# Patient Record
Sex: MALE | Race: ASIAN | NOT HISPANIC OR LATINO | URBAN - METROPOLITAN AREA
[De-identification: names, ages, dates, MRNs, and addresses within clinical notes are randomized per-mention and may not be internally consistent; named-entity substitution may affect disease eponyms.]

---

## 2017-01-13 ENCOUNTER — INPATIENT (INPATIENT)
Facility: HOSPITAL | Age: 52
LOS: 0 days | Discharge: HOME | End: 2017-01-14
Attending: INTERNAL MEDICINE | Admitting: INTERNAL MEDICINE
Payer: COMMERCIAL

## 2017-01-13 VITALS
SYSTOLIC BLOOD PRESSURE: 150 MMHG | HEART RATE: 86 BPM | RESPIRATION RATE: 18 BRPM | DIASTOLIC BLOOD PRESSURE: 100 MMHG | OXYGEN SATURATION: 1 % | TEMPERATURE: 98 F

## 2017-01-13 DIAGNOSIS — K60.3 ANAL FISTULA: Chronic | ICD-10-CM

## 2017-01-13 DIAGNOSIS — R07.9 CHEST PAIN, UNSPECIFIED: ICD-10-CM

## 2017-01-13 DIAGNOSIS — E11.9 TYPE 2 DIABETES MELLITUS WITHOUT COMPLICATIONS: ICD-10-CM

## 2017-01-13 DIAGNOSIS — I24.9 ACUTE ISCHEMIC HEART DISEASE, UNSPECIFIED: ICD-10-CM

## 2017-01-13 DIAGNOSIS — S96.912S STRAIN OF UNSPECIFIED MUSCLE AND TENDON AT ANKLE AND FOOT LEVEL, LEFT FOOT, SEQUELA: Chronic | ICD-10-CM

## 2017-01-13 DIAGNOSIS — E78.5 HYPERLIPIDEMIA, UNSPECIFIED: ICD-10-CM

## 2017-01-13 DIAGNOSIS — Z29.9 ENCOUNTER FOR PROPHYLACTIC MEASURES, UNSPECIFIED: ICD-10-CM

## 2017-01-13 DIAGNOSIS — R10.13 EPIGASTRIC PAIN: ICD-10-CM

## 2017-01-13 DIAGNOSIS — E79.0 HYPERURICEMIA WITHOUT SIGNS OF INFLAMMATORY ARTHRITIS AND TOPHACEOUS DISEASE: ICD-10-CM

## 2017-01-13 LAB
ALBUMIN SERPL ELPH-MCNC: 4.4 G/DL — SIGNIFICANT CHANGE UP (ref 3.3–5)
ALP SERPL-CCNC: 54 U/L — SIGNIFICANT CHANGE UP (ref 40–120)
ALT FLD-CCNC: 29 U/L — SIGNIFICANT CHANGE UP (ref 4–41)
AST SERPL-CCNC: 24 U/L — SIGNIFICANT CHANGE UP (ref 4–40)
BILIRUB SERPL-MCNC: 0.6 MG/DL — SIGNIFICANT CHANGE UP (ref 0.2–1.2)
BUN SERPL-MCNC: 16 MG/DL — SIGNIFICANT CHANGE UP (ref 7–23)
CALCIUM SERPL-MCNC: 9.7 MG/DL — SIGNIFICANT CHANGE UP (ref 8.4–10.5)
CHLORIDE SERPL-SCNC: 96 MMOL/L — LOW (ref 98–107)
CK MB BLD-MCNC: 7.18 NG/ML — HIGH (ref 1–6.6)
CK SERPL-CCNC: 161 U/L — SIGNIFICANT CHANGE UP (ref 30–200)
CK SERPL-CCNC: 226 U/L — HIGH (ref 30–200)
CO2 SERPL-SCNC: 21 MMOL/L — LOW (ref 22–31)
CREAT SERPL-MCNC: 0.97 MG/DL — SIGNIFICANT CHANGE UP (ref 0.5–1.3)
GLUCOSE SERPL-MCNC: 196 MG/DL — HIGH (ref 70–99)
HCT VFR BLD CALC: 44.9 % — SIGNIFICANT CHANGE UP (ref 39–50)
HGB BLD-MCNC: 15 G/DL — SIGNIFICANT CHANGE UP (ref 13–17)
HIV1 AG SER QL: SIGNIFICANT CHANGE UP
HIV1+2 AB SPEC QL: SIGNIFICANT CHANGE UP
LIDOCAIN IGE QN: 45.6 U/L — SIGNIFICANT CHANGE UP (ref 7–60)
MCHC RBC-ENTMCNC: 27.1 PG — SIGNIFICANT CHANGE UP (ref 27–34)
MCHC RBC-ENTMCNC: 33.4 % — SIGNIFICANT CHANGE UP (ref 32–36)
MCV RBC AUTO: 81 FL — SIGNIFICANT CHANGE UP (ref 80–100)
PLATELET # BLD AUTO: 273 K/UL — SIGNIFICANT CHANGE UP (ref 150–400)
PMV BLD: 9.6 FL — SIGNIFICANT CHANGE UP (ref 7–13)
POTASSIUM SERPL-MCNC: 4.8 MMOL/L — SIGNIFICANT CHANGE UP (ref 3.5–5.3)
POTASSIUM SERPL-SCNC: 4.8 MMOL/L — SIGNIFICANT CHANGE UP (ref 3.5–5.3)
PROT SERPL-MCNC: 7.6 G/DL — SIGNIFICANT CHANGE UP (ref 6–8.3)
RBC # BLD: 5.54 M/UL — SIGNIFICANT CHANGE UP (ref 4.2–5.8)
RBC # FLD: 13.3 % — SIGNIFICANT CHANGE UP (ref 10.3–14.5)
SODIUM SERPL-SCNC: 137 MMOL/L — SIGNIFICANT CHANGE UP (ref 135–145)
TROPONIN T SERPL-MCNC: < 0.06 NG/ML — SIGNIFICANT CHANGE UP (ref 0–0.06)
TROPONIN T SERPL-MCNC: < 0.06 NG/ML — SIGNIFICANT CHANGE UP (ref 0–0.06)
WBC # BLD: 10.94 K/UL — HIGH (ref 3.8–10.5)
WBC # FLD AUTO: 10.94 K/UL — HIGH (ref 3.8–10.5)

## 2017-01-13 PROCEDURE — 71020: CPT | Mod: 26

## 2017-01-13 PROCEDURE — 76705 ECHO EXAM OF ABDOMEN: CPT | Mod: 26

## 2017-01-13 PROCEDURE — 99223 1ST HOSP IP/OBS HIGH 75: CPT

## 2017-01-13 RX ORDER — FAMOTIDINE 10 MG/ML
20 INJECTION INTRAVENOUS ONCE
Qty: 0 | Refills: 0 | Status: COMPLETED | OUTPATIENT
Start: 2017-01-13 | End: 2017-01-13

## 2017-01-13 RX ORDER — INSULIN LISPRO 100/ML
VIAL (ML) SUBCUTANEOUS AT BEDTIME
Qty: 0 | Refills: 0 | Status: DISCONTINUED | OUTPATIENT
Start: 2017-01-13 | End: 2017-01-14

## 2017-01-13 RX ORDER — SODIUM CHLORIDE 9 MG/ML
1000 INJECTION, SOLUTION INTRAVENOUS
Qty: 0 | Refills: 0 | Status: DISCONTINUED | OUTPATIENT
Start: 2017-01-13 | End: 2017-01-14

## 2017-01-13 RX ORDER — ACETAMINOPHEN 500 MG
650 TABLET ORAL EVERY 6 HOURS
Qty: 0 | Refills: 0 | Status: DISCONTINUED | OUTPATIENT
Start: 2017-01-13 | End: 2017-01-14

## 2017-01-13 RX ORDER — DEXTROSE 50 % IN WATER 50 %
1 SYRINGE (ML) INTRAVENOUS ONCE
Qty: 0 | Refills: 0 | Status: DISCONTINUED | OUTPATIENT
Start: 2017-01-13 | End: 2017-01-14

## 2017-01-13 RX ORDER — GLUCAGON INJECTION, SOLUTION 0.5 MG/.1ML
1 INJECTION, SOLUTION SUBCUTANEOUS ONCE
Qty: 0 | Refills: 0 | Status: DISCONTINUED | OUTPATIENT
Start: 2017-01-13 | End: 2017-01-14

## 2017-01-13 RX ORDER — SODIUM CHLORIDE 9 MG/ML
3 INJECTION INTRAMUSCULAR; INTRAVENOUS; SUBCUTANEOUS EVERY 8 HOURS
Qty: 0 | Refills: 0 | Status: DISCONTINUED | OUTPATIENT
Start: 2017-01-13 | End: 2017-01-14

## 2017-01-13 RX ORDER — DEXTROSE 50 % IN WATER 50 %
25 SYRINGE (ML) INTRAVENOUS ONCE
Qty: 0 | Refills: 0 | Status: DISCONTINUED | OUTPATIENT
Start: 2017-01-13 | End: 2017-01-14

## 2017-01-13 RX ORDER — PANTOPRAZOLE SODIUM 20 MG/1
40 TABLET, DELAYED RELEASE ORAL
Qty: 0 | Refills: 0 | Status: DISCONTINUED | OUTPATIENT
Start: 2017-01-13 | End: 2017-01-14

## 2017-01-13 RX ORDER — INSULIN LISPRO 100/ML
VIAL (ML) SUBCUTANEOUS
Qty: 0 | Refills: 0 | Status: DISCONTINUED | OUTPATIENT
Start: 2017-01-13 | End: 2017-01-14

## 2017-01-13 RX ORDER — DEXTROSE 50 % IN WATER 50 %
12.5 SYRINGE (ML) INTRAVENOUS ONCE
Qty: 0 | Refills: 0 | Status: DISCONTINUED | OUTPATIENT
Start: 2017-01-13 | End: 2017-01-14

## 2017-01-13 RX ORDER — ASPIRIN/CALCIUM CARB/MAGNESIUM 324 MG
81 TABLET ORAL DAILY
Qty: 0 | Refills: 0 | Status: DISCONTINUED | OUTPATIENT
Start: 2017-01-13 | End: 2017-01-14

## 2017-01-13 RX ORDER — ENOXAPARIN SODIUM 100 MG/ML
40 INJECTION SUBCUTANEOUS EVERY 24 HOURS
Qty: 0 | Refills: 0 | Status: DISCONTINUED | OUTPATIENT
Start: 2017-01-13 | End: 2017-01-14

## 2017-01-13 RX ADMIN — SODIUM CHLORIDE 3 MILLILITER(S): 9 INJECTION INTRAMUSCULAR; INTRAVENOUS; SUBCUTANEOUS at 11:08

## 2017-01-13 RX ADMIN — Medication 30 MILLILITER(S): at 13:38

## 2017-01-13 RX ADMIN — FAMOTIDINE 20 MILLIGRAM(S): 10 INJECTION INTRAVENOUS at 11:08

## 2017-01-13 RX ADMIN — SODIUM CHLORIDE 3 MILLILITER(S): 9 INJECTION INTRAMUSCULAR; INTRAVENOUS; SUBCUTANEOUS at 21:45

## 2017-01-13 RX ADMIN — Medication 30 MILLILITER(S): at 21:46

## 2017-01-13 NOTE — H&P ADULT. - LYMPHATIC
posterior cervical L/posterior cervical R/supraclavicular R/supraclavicular L/anterior cervical L/anterior cervical R

## 2017-01-13 NOTE — H&P ADULT. - FAMILY HISTORY
Mother  Still living? Unknown  Family history of hypertension, Age at diagnosis: Age Unknown  Family history of diabetic complications, Age at diagnosis: Age Unknown

## 2017-01-13 NOTE — ED PROVIDER NOTE - MEDICAL DECISION MAKING DETAILS
epigastric pain that radiates to the chest, jaw and left arm, hx of DM, EKG no acute ischemia, will get serial ekgs and CE, check labs and reassess

## 2017-01-13 NOTE — H&P ADULT. - PROBLEM SELECTOR PLAN 1
CM  CE negative x 2  Cardio consult in the chart   Consider nuclear stress test   Give o2, ASA CE negative x 2, check third set with AM labs  Cardio consult in the chart   Nuclear stress test per cardiology recommendations  will start on ASA for now given DM2 and chest pain

## 2017-01-13 NOTE — H&P ADULT. - NEGATIVE NEUROLOGICAL SYMPTOMS
no tremors/no weakness/no transient paralysis/no generalized seizures/no focal seizures/no syncope/no paresthesias

## 2017-01-13 NOTE — H&P ADULT. - NEGATIVE GASTROINTESTINAL SYMPTOMS
no vomiting/no diarrhea/no constipation no vomiting/no diarrhea/no steatorrhea/no hematochezia/no melena/no constipation

## 2017-01-13 NOTE — H&P ADULT. - PROBLEM SELECTOR PLAN 2
Consider Gi consult   F/U Hepatic profile   Continue Malox PRN Given diarrhea in ED, ddx more likely viral gastroenteritis   If persistent abd pain, consider GI consult in AM  Patient had extensive previous w/u for RUQ pain that was also negative, recommended to be on PPI that patient reports he does not regularly take  Continue Maalox PRN, restart PPI

## 2017-01-13 NOTE — ED ADULT NURSE NOTE - CHIEF COMPLAINT
The patient is a 51y Male complaining of epigastric pain traveling upwards and nausea. Denies vomiting/sob.

## 2017-01-13 NOTE — H&P ADULT. - EKG AND INTERPRETATION
NSR @ 90b/ min, LAE, IRBBB EKG reviewed NSR 90b/min, isolated TWI III, QTc 440ms, auto read reporting IRBBB

## 2017-01-13 NOTE — ED PROVIDER NOTE - OBJECTIVE STATEMENT
52 yo male with a history of DM, HLD diet control, presents to the ED with epigastric pain that began yesterday while at work and has progressed, feels like pressure, at times radiates to neck and left arm, at times feels SOB and nausea, and some sweatiness, no vomiting, no palpitations, no known aggravating or alleviating factors, no leg swelling, no pleuritic component. Called his PMD Dr Whitten but he was not in the office so came to the ED.   Meds Metformin and glyburide

## 2017-01-13 NOTE — H&P ADULT. - PSH
Anal fistula  Ananl fistula repair 2013  Hx of appendectomy  1987  Tendon tear, ankle, left, sequela  2016

## 2017-01-13 NOTE — ED PROVIDER NOTE - PROGRESS NOTE DETAILS
PATSY Laura MD: D/w Dr Diaz, pt to be admitted for work up and stress test PATSY Herrera MD: D/w Dr Diaz, pt to be admitted for work up and stress test.  Dr Griffiths contacted for consult on Dr Diaz's request

## 2017-01-13 NOTE — H&P ADULT. - ASSESSMENT
51M admitted for ACS 51-year-old Omani male with NIDDM2 presented with 1 day of sharp/twisting/cramping, 10/10, constant, abdominal pain that radiated to his L side chest , L arm and back.

## 2017-01-13 NOTE — H&P ADULT. - RS GEN PE MLT RESP DETAILS PC
breath sounds equal/good air movement/respirations non-labored/airway patent good air movement/no wheezes/no rhonchi/no intercostal retractions/respirations non-labored/breath sounds equal/normal/airway patent/clear to auscultation bilaterally/no rales

## 2017-01-13 NOTE — H&P ADULT. - NEGATIVE ENMT SYMPTOMS
no abnormal taste sensation/no nose bleeds/no gum bleeding no gum bleeding/no dysphagia/no abnormal taste sensation/no sinus symptoms/no hearing difficulty/no nose bleeds

## 2017-01-13 NOTE — H&P ADULT. - HISTORY OF PRESENT ILLNESS
51M with a past medical history of DM2, experienced exertional, sharp, 10 /10, constant, epigastric pain that radiated to his L side chest , L arm and back. Positive SOB, nausea, chills. No cough, vomit, diaphoresis. The sharp, 10/10 pain, last until he drove himself to the Ed on 12/13.16.     In the Ed, he had CE negative x 2.  Hepatic US that showed Hepatic stenosis. He was seen by Dr Griffiths for cardio. His consult and recommendations are in the chart. The pt was given Maalox and Pepcid IV and he experience relief of his sharp 10/10 pain. 51M with a past medical history of DM2, experienced exertional, sharp, 10/10, constant, epigastric pain that radiated to his L side chest , L arm and back. Positive SOB, nausea, chills. No cough, vomit, diaphoresis. The sharp, 10/10 pain, last until he drove himself to the Ed on 12/13.16. He has never experienced pain like this in the past. In 2013 He had a stress test by Dr Mccoy and says it was normal.      In the Ed, he had CE negative x 2.  Hepatic US that showed Hepatic stenosis. He was seen by Dr Griffiths for cardio. His consult and recommendations are in the chart. The pt was given Maalox and Pepcid IV and he experience relief of his sharp 10/10 pain. 51-year-old Qatari male with NIDDM2 presented with 1 day of sharp/twisting/cramping, 10/10, constant, abdominal pain that radiated to his L side chest, L arm and back. Patient reports pain is predominantly in epigastric region however travels to [place] and [place]. He reports relief of abdominal pain with laying down and flexing his knees, pain worse with food.  He has had nausea and chills, denies vomiting.  Had 3 episodes of non-bloody diarrhea since presenting to the emergency department. States has never had pain like this before.  He denies fevers, shortness of breath, cough, palpitations, dysuria, diaphoresis. He reports pain improved in the ED s/p Maalox. In 2013 he had a stress test by Dr. Mccoy and reports it was normal.  Patient reports having a prior EGD that was nml.  He reports he occasionally uses omeprazole 20mg however has not recently been taking this.  He denies any recent travel or sick contacts.  Last abx use was augmentin, 5 day course approx 1 mo. ago.    In the Ed, he had CE negative x 2.  Hepatic US that showed Hepatic stenosis. He was seen by Dr Griffiths from cardiology. His consult and recommendations are in the chart. The pt was given Maalox and Pepcid IV and he experience relief of his sharp 10/10 pain.    Of note, on review of historical [employer] notes patient had an EGD 12/09/2010 for abd pain and found to have mild gastritis/esophagitis (negative for H. pylori), had a colonoscopy 08/07/2012 that was negative except for internal hemorrhoids and had a CT abd 8/03/10 without IV con that showed no obstructive uropathy.    He also had multiple prior stress tests, the last myocardial perfusion SPECT was in 07/16/2012 performed by Dr. Mccoy that was normal. 51-year-old Salvadorean male with NIDDM2 presented with 1 day of sharp/twisting/cramping, 10/10, constant, abdominal pain that radiated to his L side chest, L arm and back. Pain started at work yesterday when he was sitting at the computer and has persisted since that time. Patient reports pain is predominantly in epigastric region however travels to [facility] and [place]. He reports relief of abdominal pain with laying down and flexing his knees, pain worse with food.  He has had nausea and chills, denies vomiting.  Had 3 episodes of non-bloody diarrhea since presenting to the emergency department. States has never had pain like this before.  He denies fevers, shortness of breath, cough, palpitations, dysuria, diaphoresis. He reports pain improved in the ED s/p Maalox. In 2013 he had a stress test by Dr. Mccoy and reports it was normal.  Patient reports having a prior EGD that was nml.  He reports he occasionally uses omeprazole 20mg however has not recently been taking this.  He denies any recent travel or sick contacts.  Last abx use was augmentin, 5 day course approx 1 mo. ago.    In the Ed, he had CE negative x 2.  Hepatic US that showed Hepatic stenosis. He was seen by Dr Griffiths from cardiology. His consult and recommendations are in the chart. The pt was given Maalox and Pepcid IV and he experience relief of his sharp 10/10 pain.    Of note, on review of historical [employer] notes patient had an EGD 12/09/2010 for abd pain and found to have mild gastritis/esophagitis (negative for H. pylori), had a colonoscopy 08/07/2012 that was negative except for internal hemorrhoids and had a CT abd 8/03/10 without IV con that showed no obstructive uropathy.    He also had multiple prior stress tests, the last myocardial perfusion SPECT was in 07/16/2012 performed by Dr. Mccoy that was normal.

## 2017-01-13 NOTE — H&P ADULT. - NEGATIVE CARDIOVASCULAR SYMPTOMS
no palpitations/no orthopnea/no dyspnea on exertion no dyspnea on exertion/no peripheral edema/no palpitations/no orthopnea

## 2017-01-13 NOTE — H&P ADULT. - PROBLEM SELECTOR PLAN 3
F/U A1C  FS QID  Humalog SS F/U A1C  FS QID  Humalog SS  hold home oral hypoglycemics (pt reports didn't take his meds yesterday)

## 2017-01-13 NOTE — H&P ADULT. - LAB RESULTS AND INTERPRETATION
Labs personally reviewed  CE (-)x2  lipase wnl  hyperglycemia  normal renal function   LFTs wnl  CBC unremarkable

## 2017-01-14 ENCOUNTER — TRANSCRIPTION ENCOUNTER (OUTPATIENT)
Age: 52
End: 2017-01-14

## 2017-01-14 VITALS
TEMPERATURE: 98 F | HEART RATE: 70 BPM | SYSTOLIC BLOOD PRESSURE: 150 MMHG | OXYGEN SATURATION: 97 % | DIASTOLIC BLOOD PRESSURE: 94 MMHG | RESPIRATION RATE: 18 BRPM

## 2017-01-14 LAB
BUN SERPL-MCNC: 15 MG/DL — SIGNIFICANT CHANGE UP (ref 7–23)
CALCIUM SERPL-MCNC: 9.1 MG/DL — SIGNIFICANT CHANGE UP (ref 8.4–10.5)
CHLORIDE SERPL-SCNC: 102 MMOL/L — SIGNIFICANT CHANGE UP (ref 98–107)
CHOLEST SERPL-MCNC: 192 MG/DL — SIGNIFICANT CHANGE UP (ref 120–199)
CK SERPL-CCNC: 108 U/L — SIGNIFICANT CHANGE UP (ref 30–200)
CO2 SERPL-SCNC: 22 MMOL/L — SIGNIFICANT CHANGE UP (ref 22–31)
CREAT SERPL-MCNC: 0.88 MG/DL — SIGNIFICANT CHANGE UP (ref 0.5–1.3)
GLUCOSE SERPL-MCNC: 176 MG/DL — HIGH (ref 70–99)
HBA1C BLD-MCNC: 7.9 % — HIGH (ref 4–5.6)
HCT VFR BLD CALC: 42.9 % — SIGNIFICANT CHANGE UP (ref 39–50)
HDLC SERPL-MCNC: 41 MG/DL — SIGNIFICANT CHANGE UP (ref 35–55)
HGB BLD-MCNC: 14.3 G/DL — SIGNIFICANT CHANGE UP (ref 13–17)
LIPID PNL WITH DIRECT LDL SERPL: 129 MG/DL — SIGNIFICANT CHANGE UP
MCHC RBC-ENTMCNC: 26.9 PG — LOW (ref 27–34)
MCHC RBC-ENTMCNC: 33.3 % — SIGNIFICANT CHANGE UP (ref 32–36)
MCV RBC AUTO: 80.8 FL — SIGNIFICANT CHANGE UP (ref 80–100)
PLATELET # BLD AUTO: 267 K/UL — SIGNIFICANT CHANGE UP (ref 150–400)
PMV BLD: 9.1 FL — SIGNIFICANT CHANGE UP (ref 7–13)
POTASSIUM SERPL-MCNC: 4 MMOL/L — SIGNIFICANT CHANGE UP (ref 3.5–5.3)
POTASSIUM SERPL-SCNC: 4 MMOL/L — SIGNIFICANT CHANGE UP (ref 3.5–5.3)
RBC # BLD: 5.31 M/UL — SIGNIFICANT CHANGE UP (ref 4.2–5.8)
RBC # FLD: 13.5 % — SIGNIFICANT CHANGE UP (ref 10.3–14.5)
SODIUM SERPL-SCNC: 140 MMOL/L — SIGNIFICANT CHANGE UP (ref 135–145)
TRIGL SERPL-MCNC: 239 MG/DL — HIGH (ref 10–149)
TROPONIN T SERPL-MCNC: < 0.06 NG/ML — SIGNIFICANT CHANGE UP (ref 0–0.06)
URATE SERPL-MCNC: 7.8 MG/DL — SIGNIFICANT CHANGE UP (ref 3.4–8.8)
WBC # BLD: 8.81 K/UL — SIGNIFICANT CHANGE UP (ref 3.8–10.5)
WBC # FLD AUTO: 8.81 K/UL — SIGNIFICANT CHANGE UP (ref 3.8–10.5)

## 2017-01-14 PROCEDURE — 99239 HOSP IP/OBS DSCHRG MGMT >30: CPT

## 2017-01-14 RX ORDER — PANTOPRAZOLE SODIUM 20 MG/1
1 TABLET, DELAYED RELEASE ORAL
Qty: 30 | Refills: 0
Start: 2017-01-14 | End: 2017-02-13

## 2017-01-14 RX ORDER — PANTOPRAZOLE SODIUM 20 MG/1
1 TABLET, DELAYED RELEASE ORAL
Qty: 0 | Refills: 0 | COMMUNITY
Start: 2017-01-14

## 2017-01-14 RX ADMIN — SODIUM CHLORIDE 3 MILLILITER(S): 9 INJECTION INTRAMUSCULAR; INTRAVENOUS; SUBCUTANEOUS at 06:03

## 2017-01-14 RX ADMIN — PANTOPRAZOLE SODIUM 40 MILLIGRAM(S): 20 TABLET, DELAYED RELEASE ORAL at 07:39

## 2017-01-14 RX ADMIN — SODIUM CHLORIDE 3 MILLILITER(S): 9 INJECTION INTRAMUSCULAR; INTRAVENOUS; SUBCUTANEOUS at 14:45

## 2017-01-14 RX ADMIN — Medication: at 12:38

## 2017-01-14 NOTE — DISCHARGE NOTE ADULT - PATIENT PORTAL LINK FT
“You can access the [facility] Patient Portal, offered by [facility], by registering with the following website: [email or web address]”

## 2017-01-14 NOTE — DISCHARGE NOTE ADULT - HOSPITAL COURSE
52 y/o [place] male, with a PmHx of NIDDM2, presented with 1 day of sharp/twisting/cramping, 10/10, constant, abdominal pain that radiated to his L side chest, L arm and back. Pain started at work yesterday when he was sitting at the computer and has persisted since that time. Patient reports pain is predominantly in epigastric region however travels to [place] and [place]. He reports relief of abdominal pain with laying down and flexing his knees, pain worse with food.  He has had nausea and chills, denies vomiting.  Had 3 episodes of non-bloody diarrhea since presenting to the emergency department. States has never had pain like this before.  He denies fevers, shortness of breath, cough, palpitations, dysuria, diaphoresis. He reports pain improved in the ED s/p Maalox. In 2013 he had a stress test by Dr. Mccoy and reports it was normal.  Patient reports having a prior EGD that was nml.  He reports he occasionally uses omeprazole 20mg however has not recently been taking this.  He denies any recent travel or sick contacts.  Last abx use was augmentin, 5 day course approx 1 mo. ago.    On admission, he had CE negative x 3.  MI ruled out. EKG done showed NSR 90b/ min, LAE, IRBBB. Hepatic US done showed Hepatic stenosis. He was seen by Dr Griffiths from cardiology, recommended inpatient vs outpatient stress test.  The pt was given Maalox and Pepcid IV and he experienced some relief of his sharp 10/10 pain. CXR done showed a normal chest xray. House GI c/s done rec inpatient vs outpatient EGD. If pt was still symptomatic would do stool cultures, O & P. Pt comfortable at this time and wants to leave the hospital for an outpatient workup. He will need to follow up with GI for an EGD, Cardio Dr. Griffiths for outpatient stress test or though the [facility] cardio department. Pt is medically cleared for discharge home as per Hospitalist Dr. Dumont.    Of note, on review of historical [employer] notes patient had an EGD 12/09/2010 for abd pain and found to have mild gastritis/esophagitis (negative for H. pylori), had a colonoscopy 08/07/2012 that was negative except for internal hemorrhoids and had a CT abd 8/03/10 without IV con that showed no obstructive uropathy. He also had multiple prior stress tests, the last myocardial perfusion SPECT was in 07/16/2012 performed by Dr. Mccoy that was normal.

## 2017-01-14 NOTE — DISCHARGE NOTE ADULT - PLAN OF CARE
Continue protonix as prescribed. Call the [facility] Gastroenterology Office [phone] to make an appointment for an outpatient EGD (Endoscopy). If Diarrhea persists, return to the ED for stool cultures and further testing.  Outpatient Stress Test - [facility] Cardiology Department [phone]. Call to schedule a stress test. Follow up with Dr. Griffiths (Cardiologist) Monitor Finger sticks. Continue home medications as prescribed. HgbA1-c: 7.9. You will need to follow with your Endocrinologist or Primary Care Doctor for better diabetes control. Low cholesterol diet

## 2017-01-14 NOTE — DISCHARGE NOTE ADULT - CARE PROVIDER_API CALL
Yaniv Griffiths), Cardiovascular Disease; Internal Medicine; Interventional Cardiology  [address]  [place] [address]  Phone: [phone]  Fax: [phone]    Hilda Whitten), Internal Medicine; Nephrology  [address] [place] [address]  Phone: [phone]  Fax: [phone]    Ihsan Brown (DI), Internal Medicine  [address] [place] [address]  Phone: [phone]  Fax: [phone]

## 2017-01-14 NOTE — DISCHARGE NOTE ADULT - CARE PLAN
Principal Discharge DX:	Epigastric pain  Goal:	Continue protonix as prescribed.  Instructions for follow-up, activity and diet:	Call the [facility] Gastroenterology Office [phone] to make an appointment for an outpatient EGD (Endoscopy). If Diarrhea persists, return to the ED for stool cultures and further testing.  Outpatient Stress Test - [facility] Cardiology Department [phone]. Call to schedule a stress test. Follow up with Dr. Griffiths (Cardiologist)  Secondary Diagnosis:	Diabetes mellitus, type 2  Goal:	Monitor Finger sticks. Continue home medications as prescribed.  Instructions for follow-up, activity and diet:	HgbA1-c: 7.9. You will need to follow with your Endocrinologist or Primary Care Doctor for better diabetes control.  Secondary Diagnosis:	Dyslipidemia  Goal:	Low cholesterol diet Principal Discharge DX:	Epigastric pain  Goal:	Continue protonix as prescribed.  Instructions for follow-up, activity and diet:	Call the [facility] Gastroenterology Office [phone] to make an appointment for an outpatient EGD (Endoscopy). If Diarrhea persists, return to the ED for stool cultures and further testing.  Outpatient Stress Test - [facility] Cardiology Department [phone]. Call to schedule a stress test. Follow up with Dr. Griffiths (Cardiologist)  Secondary Diagnosis:	Diabetes mellitus, type 2  Goal:	Monitor Finger sticks. Continue home medications as prescribed.  Instructions for follow-up, activity and diet:	HgbA1-c: 7.9. You will need to follow with your Endocrinologist or Primary Care Doctor for better diabetes control.  Secondary Diagnosis:	Dyslipidemia  Goal:	Low cholesterol diet

## 2017-01-14 NOTE — DISCHARGE NOTE ADULT - ADDITIONAL INSTRUCTIONS
Follow with [facility] Gastroenterology - [phone] - outpatient Endoscopy.  Follow up with Dr. Griffiths  - Cardiology for stress test or call [phone] to schedule a stress test through [facility]  Follow up with your Primary care doctor for better diabetes management.

## 2017-01-14 NOTE — DISCHARGE NOTE ADULT - CARE PROVIDERS DIRECT ADDRESSES
,DirectAddress_Unknown,[email or web address],DirectAddress_Unknown,DirectAddress_Unknown

## 2017-01-15 LAB
HAV IGM SER-ACNC: NONREACTIVE — SIGNIFICANT CHANGE UP
HBV CORE IGM SER-ACNC: NONREACTIVE — SIGNIFICANT CHANGE UP
HBV SURFACE AG SER-ACNC: NONREACTIVE — SIGNIFICANT CHANGE UP
HCV AB S/CO SERPL IA: 0.07 S/CO — SIGNIFICANT CHANGE UP
HCV AB SERPL-IMP: SIGNIFICANT CHANGE UP

## 2017-01-23 ENCOUNTER — RX RENEWAL (OUTPATIENT)
Age: 52
End: 2017-01-23

## 2017-02-01 ENCOUNTER — APPOINTMENT (OUTPATIENT)
Dept: CARDIOLOGY | Facility: CLINIC | Age: 52
End: 2017-02-01

## 2017-02-01 ENCOUNTER — NON-APPOINTMENT (OUTPATIENT)
Age: 52
End: 2017-02-01

## 2017-02-01 VITALS
SYSTOLIC BLOOD PRESSURE: 130 MMHG | OXYGEN SATURATION: 100 % | HEART RATE: 73 BPM | BODY MASS INDEX: 25.18 KG/M2 | WEIGHT: 156 LBS | DIASTOLIC BLOOD PRESSURE: 90 MMHG

## 2017-02-01 DIAGNOSIS — R07.9 CHEST PAIN, UNSPECIFIED: ICD-10-CM

## 2017-02-03 LAB — CRP SERPL-MCNC: <0.2 MG/DL

## 2017-02-22 ENCOUNTER — APPOINTMENT (OUTPATIENT)
Dept: CARDIOLOGY | Facility: CLINIC | Age: 52
End: 2017-02-22

## 2017-03-06 ENCOUNTER — APPOINTMENT (OUTPATIENT)
Dept: ENDOCRINOLOGY | Facility: CLINIC | Age: 52
End: 2017-03-06

## 2017-03-06 ENCOUNTER — APPOINTMENT (OUTPATIENT)
Dept: OPHTHALMOLOGY | Facility: CLINIC | Age: 52
End: 2017-03-06

## 2017-03-17 ENCOUNTER — APPOINTMENT (OUTPATIENT)
Dept: CARDIOLOGY | Facility: CLINIC | Age: 52
End: 2017-03-17

## 2017-03-22 ENCOUNTER — APPOINTMENT (OUTPATIENT)
Dept: OPHTHALMOLOGY | Facility: CLINIC | Age: 52
End: 2017-03-22

## 2017-04-03 ENCOUNTER — MESSAGE (OUTPATIENT)
Age: 52
End: 2017-04-03

## 2017-04-03 DIAGNOSIS — L02.91 CUTANEOUS ABSCESS, UNSPECIFIED: ICD-10-CM

## 2017-05-25 ENCOUNTER — LABORATORY RESULT (OUTPATIENT)
Age: 52
End: 2017-05-25

## 2017-05-25 ENCOUNTER — APPOINTMENT (OUTPATIENT)
Dept: INTERNAL MEDICINE | Facility: CLINIC | Age: 52
End: 2017-05-25

## 2017-05-25 VITALS — SYSTOLIC BLOOD PRESSURE: 130 MMHG | DIASTOLIC BLOOD PRESSURE: 80 MMHG

## 2017-05-25 VITALS — WEIGHT: 156 LBS | TEMPERATURE: 98.3 F | BODY MASS INDEX: 25.68 KG/M2 | HEIGHT: 65.5 IN

## 2017-05-25 LAB
BILIRUB UR QL STRIP: NORMAL
CLARITY UR: CLEAR
COLLECTION METHOD: NORMAL
GLUCOSE UR-MCNC: NORMAL
HCG UR QL: 0.2 EU/DL
HGB UR QL STRIP.AUTO: NORMAL
KETONES UR-MCNC: NORMAL
LEUKOCYTE ESTERASE UR QL STRIP: NORMAL
NITRITE UR QL STRIP: NORMAL
PH UR STRIP: 6
PROT UR STRIP-MCNC: NORMAL
SAVE SPECIMEN: NORMAL
SP GR UR STRIP: 1.02

## 2017-05-25 RX ORDER — CEPHALEXIN 500 MG/1
500 CAPSULE ORAL EVERY 6 HOURS
Qty: 28 | Refills: 0 | Status: DISCONTINUED | COMMUNITY
Start: 2017-04-03 | End: 2017-05-25

## 2017-05-28 ENCOUNTER — TRANSCRIPTION ENCOUNTER (OUTPATIENT)
Age: 52
End: 2017-05-28

## 2017-05-29 ENCOUNTER — CLINICAL ADVICE (OUTPATIENT)
Age: 52
End: 2017-05-29

## 2017-05-29 LAB
25(OH)D3 SERPL-MCNC: 22.6 NG/ML
ALBUMIN SERPL ELPH-MCNC: 4.2 G/DL
ALP BLD-CCNC: 59 U/L
ALT SERPL-CCNC: 34 U/L
ANION GAP SERPL CALC-SCNC: 15 MMOL/L
AST SERPL-CCNC: 15 U/L
BASOPHILS # BLD AUTO: 0.02 K/UL
BASOPHILS NFR BLD AUTO: 0.4 %
BILIRUB SERPL-MCNC: 0.5 MG/DL
BUN SERPL-MCNC: 13 MG/DL
CALCIUM SERPL-MCNC: 8.7 MG/DL
CHLORIDE SERPL-SCNC: 100 MMOL/L
CHOLEST SERPL-MCNC: 176 MG/DL
CHOLEST/HDLC SERPL: 5.5 RATIO
CK SERPL-CCNC: 139 U/L
CO2 SERPL-SCNC: 22 MMOL/L
CREAT SERPL-MCNC: 0.86 MG/DL
EOSINOPHIL # BLD AUTO: 0.07 K/UL
EOSINOPHIL NFR BLD AUTO: 1.3 %
ERYTHROCYTE [SEDIMENTATION RATE] IN BLOOD BY WESTERGREN METHOD: 11 MM/HR
GLUCOSE SERPL-MCNC: 294 MG/DL
HBA1C MFR BLD HPLC: 11.1 %
HCT VFR BLD CALC: 46.4 %
HDLC SERPL-MCNC: 32 MG/DL
HGB BLD-MCNC: 15.5 G/DL
IMM GRANULOCYTES NFR BLD AUTO: 0.2 %
LDLC SERPL CALC-MCNC: 104 MG/DL
LYMPHOCYTES # BLD AUTO: 1.73 K/UL
LYMPHOCYTES NFR BLD AUTO: 32 %
MAGNESIUM SERPL-MCNC: 1.9 MG/DL
MAN DIFF?: NORMAL
MCHC RBC-ENTMCNC: 27.1 PG
MCHC RBC-ENTMCNC: 33.4 GM/DL
MCV RBC AUTO: 81 FL
MONOCYTES # BLD AUTO: 0.49 K/UL
MONOCYTES NFR BLD AUTO: 9.1 %
NEUTROPHILS # BLD AUTO: 3.08 K/UL
NEUTROPHILS NFR BLD AUTO: 57 %
PHOSPHATE SERPL-MCNC: 2.2 MG/DL
PLATELET # BLD AUTO: 242 K/UL
POTASSIUM SERPL-SCNC: 4.2 MMOL/L
PROT SERPL-MCNC: 7.5 G/DL
RBC # BLD: 5.73 M/UL
RBC # FLD: 13.2 %
SODIUM SERPL-SCNC: 137 MMOL/L
T3RU NFR SERPL: 0.94 INDEX
T4 SERPL-MCNC: 6.8 UG/DL
TRIGL SERPL-MCNC: 198 MG/DL
TSH SERPL-ACNC: 1.16 UIU/ML
URATE SERPL-MCNC: 5 MG/DL
WBC # FLD AUTO: 5.4 K/UL

## 2017-06-04 ENCOUNTER — CLINICAL ADVICE (OUTPATIENT)
Age: 52
End: 2017-06-04

## 2017-06-04 LAB
B BURGDOR AB SER-IMP: NEGATIVE
B BURGDOR IGM PATRN SER IB-IMP: POSITIVE
B BURGDOR18/20KD IGM SER QL IB: NORMAL
B BURGDOR18KD IGG SER QL IB: NORMAL
B BURGDOR23KD IGG SER QL IB: NORMAL
B BURGDOR23KD IGM SER QL IB: PRESENT
B BURGDOR28KD AB SER QL IB: NORMAL
B BURGDOR28KD IGG SER QL IB: NORMAL
B BURGDOR30KD AB SER QL IB: NORMAL
B BURGDOR30KD IGG SER QL IB: NORMAL
B BURGDOR31KD IGG SER QL IB: NORMAL
B BURGDOR31KD IGM SER QL IB: NORMAL
B BURGDOR39KD IGG SER QL IB: NORMAL
B BURGDOR39KD IGM SER QL IB: PRESENT
B BURGDOR41KD IGG SER QL IB: NORMAL
B BURGDOR41KD IGM SER QL IB: PRESENT
B BURGDOR45KD AB SER QL IB: NORMAL
B BURGDOR45KD IGG SER QL IB: NORMAL
B BURGDOR58KD AB SER QL IB: NORMAL
B BURGDOR58KD IGG SER QL IB: NORMAL
B BURGDOR66KD IGG SER QL IB: NORMAL
B BURGDOR66KD IGM SER QL IB: NORMAL
B BURGDOR93KD IGG SER QL IB: NORMAL
B BURGDOR93KD IGM SER QL IB: NORMAL
TESTOST BND SERPL-MCNC: 6.7 PG/ML
TESTOST SERPL-MCNC: 248.6 NG/DL

## 2017-06-07 ENCOUNTER — APPOINTMENT (OUTPATIENT)
Dept: INTERNAL MEDICINE | Facility: CLINIC | Age: 52
End: 2017-06-07

## 2017-06-07 ENCOUNTER — LABORATORY RESULT (OUTPATIENT)
Age: 52
End: 2017-06-07

## 2017-06-08 LAB
ALBUMIN SERPL ELPH-MCNC: 4.3 G/DL
ALP BLD-CCNC: 61 U/L
ALT SERPL-CCNC: 18 U/L
ANION GAP SERPL CALC-SCNC: 21 MMOL/L
AST SERPL-CCNC: 11 U/L
BASOPHILS # BLD AUTO: 0.02 K/UL
BASOPHILS NFR BLD AUTO: 0.3 %
BILIRUB SERPL-MCNC: 0.4 MG/DL
BUN SERPL-MCNC: 15 MG/DL
CALCIUM SERPL-MCNC: 9.7 MG/DL
CHLORIDE SERPL-SCNC: 97 MMOL/L
CHOLEST SERPL-MCNC: 239 MG/DL
CHOLEST/HDLC SERPL: 5.1 RATIO
CO2 SERPL-SCNC: 21 MMOL/L
CREAT SERPL-MCNC: 0.9 MG/DL
EOSINOPHIL # BLD AUTO: 0.07 K/UL
EOSINOPHIL NFR BLD AUTO: 1.2 %
GLUCOSE SERPL-MCNC: 247 MG/DL
HBA1C MFR BLD HPLC: 11.1 %
HCT VFR BLD CALC: 46.8 %
HDLC SERPL-MCNC: 47 MG/DL
HGB BLD-MCNC: 15.3 G/DL
IMM GRANULOCYTES NFR BLD AUTO: 0.2 %
LDLC SERPL CALC-MCNC: 136 MG/DL
LYMPHOCYTES # BLD AUTO: 2.34 K/UL
LYMPHOCYTES NFR BLD AUTO: 39.1 %
MAN DIFF?: NORMAL
MCHC RBC-ENTMCNC: 27.5 PG
MCHC RBC-ENTMCNC: 32.7 GM/DL
MCV RBC AUTO: 84 FL
MONOCYTES # BLD AUTO: 0.28 K/UL
MONOCYTES NFR BLD AUTO: 4.7 %
NEUTROPHILS # BLD AUTO: 3.27 K/UL
NEUTROPHILS NFR BLD AUTO: 54.5 %
PLATELET # BLD AUTO: 275 K/UL
POTASSIUM SERPL-SCNC: 4.6 MMOL/L
PROT SERPL-MCNC: 7.6 G/DL
RBC # BLD: 5.57 M/UL
RBC # FLD: 13.6 %
SAVE SPECIMEN: NORMAL
SODIUM SERPL-SCNC: 139 MMOL/L
TRIGL SERPL-MCNC: 280 MG/DL
WBC # FLD AUTO: 5.99 K/UL

## 2017-06-12 ENCOUNTER — APPOINTMENT (OUTPATIENT)
Dept: ENDOCRINOLOGY | Facility: CLINIC | Age: 52
End: 2017-06-12

## 2017-06-12 VITALS
DIASTOLIC BLOOD PRESSURE: 80 MMHG | HEIGHT: 65.5 IN | SYSTOLIC BLOOD PRESSURE: 120 MMHG | OXYGEN SATURATION: 98 % | HEART RATE: 68 BPM | WEIGHT: 156 LBS | BODY MASS INDEX: 25.68 KG/M2

## 2017-06-13 ENCOUNTER — RX RENEWAL (OUTPATIENT)
Age: 52
End: 2017-06-13

## 2017-06-20 LAB
CREAT SPEC-SCNC: 56 MG/DL
FSH SERPL-MCNC: 3.7 IU/L
LH SERPL-ACNC: 6.1 IU/L
MICROALBUMIN 24H UR DL<=1MG/L-MCNC: 1 MG/DL
MICROALBUMIN/CREAT 24H UR-RTO: 18 MG/G
SHBG SERPL-SCNC: 12 NMOL/L
TESTOST BND SERPL-MCNC: 11.9 PG/ML
TESTOST SERPL-MCNC: 339.9 NG/DL

## 2017-06-22 ENCOUNTER — CLINICAL ADVICE (OUTPATIENT)
Age: 52
End: 2017-06-22

## 2017-06-27 ENCOUNTER — NON-APPOINTMENT (OUTPATIENT)
Age: 52
End: 2017-06-27

## 2017-06-27 ENCOUNTER — LABORATORY RESULT (OUTPATIENT)
Age: 52
End: 2017-06-27

## 2017-06-27 ENCOUNTER — APPOINTMENT (OUTPATIENT)
Dept: INTERNAL MEDICINE | Facility: CLINIC | Age: 52
End: 2017-06-27

## 2017-06-27 VITALS — DIASTOLIC BLOOD PRESSURE: 70 MMHG | SYSTOLIC BLOOD PRESSURE: 120 MMHG

## 2017-06-27 LAB
ALBUMIN SERPL ELPH-MCNC: 4.5 G/DL
ALP BLD-CCNC: 63 U/L
ALT SERPL-CCNC: 23 U/L
ANION GAP SERPL CALC-SCNC: 16 MMOL/L
AST SERPL-CCNC: 13 U/L
BASOPHILS # BLD AUTO: 0.04 K/UL
BASOPHILS NFR BLD AUTO: 0.7 %
BILIRUB SERPL-MCNC: 0.5 MG/DL
BUN SERPL-MCNC: 11 MG/DL
CALCIUM SERPL-MCNC: 9.1 MG/DL
CHLORIDE SERPL-SCNC: 102 MMOL/L
CHOLEST SERPL-MCNC: 133 MG/DL
CHOLEST/HDLC SERPL: 3 RATIO
CO2 SERPL-SCNC: 22 MMOL/L
CREAT SERPL-MCNC: 0.98 MG/DL
EOSINOPHIL # BLD AUTO: 0.09 K/UL
EOSINOPHIL NFR BLD AUTO: 1.5 %
GLUCOSE SERPL-MCNC: 128 MG/DL
HCT VFR BLD CALC: 44.8 %
HDLC SERPL-MCNC: 44 MG/DL
HGB BLD-MCNC: 14.8 G/DL
IMM GRANULOCYTES NFR BLD AUTO: 0.2 %
LDLC SERPL CALC-MCNC: 57 MG/DL
LYMPHOCYTES # BLD AUTO: 2.3 K/UL
LYMPHOCYTES NFR BLD AUTO: 39.3 %
MAN DIFF?: NORMAL
MCHC RBC-ENTMCNC: 27 PG
MCHC RBC-ENTMCNC: 33 GM/DL
MCV RBC AUTO: 81.6 FL
MONOCYTES # BLD AUTO: 0.32 K/UL
MONOCYTES NFR BLD AUTO: 5.5 %
NEUTROPHILS # BLD AUTO: 3.09 K/UL
NEUTROPHILS NFR BLD AUTO: 52.8 %
PLATELET # BLD AUTO: 262 K/UL
POTASSIUM SERPL-SCNC: 4.6 MMOL/L
PROT SERPL-MCNC: 7.6 G/DL
RBC # BLD: 5.49 M/UL
RBC # FLD: 13.2 %
SAVE SPECIMEN: NORMAL
SODIUM SERPL-SCNC: 140 MMOL/L
T3RU NFR SERPL: 0.94 INDEX
T4 SERPL-MCNC: 6.2 UG/DL
TRIGL SERPL-MCNC: 158 MG/DL
TSH SERPL-ACNC: 1.5 UIU/ML
URATE SERPL-MCNC: 5.9 MG/DL
WBC # FLD AUTO: 5.85 K/UL

## 2017-06-28 ENCOUNTER — CLINICAL ADVICE (OUTPATIENT)
Age: 52
End: 2017-06-28

## 2017-06-28 LAB
25(OH)D3 SERPL-MCNC: 26.2 NG/ML
ERYTHROCYTE [SEDIMENTATION RATE] IN BLOOD BY WESTERGREN METHOD: 5 MM/HR
HBA1C MFR BLD HPLC: 10.3 %

## 2017-07-07 ENCOUNTER — FORM ENCOUNTER (OUTPATIENT)
Age: 52
End: 2017-07-07

## 2017-07-08 ENCOUNTER — OUTPATIENT (OUTPATIENT)
Dept: OUTPATIENT SERVICES | Facility: HOSPITAL | Age: 52
LOS: 1 days | End: 2017-07-08
Payer: COMMERCIAL

## 2017-07-08 ENCOUNTER — APPOINTMENT (OUTPATIENT)
Dept: ULTRASOUND IMAGING | Facility: IMAGING CENTER | Age: 52
End: 2017-07-08

## 2017-07-08 DIAGNOSIS — E01.0 IODINE-DEFICIENCY RELATED DIFFUSE (ENDEMIC) GOITER: ICD-10-CM

## 2017-07-08 DIAGNOSIS — S96.912S STRAIN OF UNSPECIFIED MUSCLE AND TENDON AT ANKLE AND FOOT LEVEL, LEFT FOOT, SEQUELA: Chronic | ICD-10-CM

## 2017-07-08 DIAGNOSIS — K60.3 ANAL FISTULA: Chronic | ICD-10-CM

## 2017-07-08 PROCEDURE — 76536 US EXAM OF HEAD AND NECK: CPT

## 2017-08-01 ENCOUNTER — NON-APPOINTMENT (OUTPATIENT)
Age: 52
End: 2017-08-01

## 2017-08-01 ENCOUNTER — APPOINTMENT (OUTPATIENT)
Dept: INTERNAL MEDICINE | Facility: CLINIC | Age: 52
End: 2017-08-01
Payer: COMMERCIAL

## 2017-08-01 ENCOUNTER — LABORATORY RESULT (OUTPATIENT)
Age: 52
End: 2017-08-01

## 2017-08-01 VITALS — DIASTOLIC BLOOD PRESSURE: 70 MMHG | SYSTOLIC BLOOD PRESSURE: 120 MMHG

## 2017-08-01 VITALS — BODY MASS INDEX: 25.07 KG/M2 | WEIGHT: 156 LBS | HEIGHT: 66 IN

## 2017-08-01 LAB
ALBUMIN SERPL ELPH-MCNC: 4.7 G/DL
ALP BLD-CCNC: 54 U/L
ALT SERPL-CCNC: 19 U/L
ANION GAP SERPL CALC-SCNC: 15 MMOL/L
AST SERPL-CCNC: 13 U/L
BASOPHILS # BLD AUTO: 0.02 K/UL
BASOPHILS NFR BLD AUTO: 0.3 %
BILIRUB SERPL-MCNC: 0.4 MG/DL
BUN SERPL-MCNC: 16 MG/DL
CALCIUM SERPL-MCNC: 9.9 MG/DL
CHLORIDE SERPL-SCNC: 104 MMOL/L
CHOLEST SERPL-MCNC: 155 MG/DL
CHOLEST/HDLC SERPL: 3.3 RATIO
CO2 SERPL-SCNC: 23 MMOL/L
CREAT SERPL-MCNC: 0.98 MG/DL
EOSINOPHIL # BLD AUTO: 0.09 K/UL
EOSINOPHIL NFR BLD AUTO: 1.5 %
HCT VFR BLD CALC: 44.7 %
HDLC SERPL-MCNC: 47 MG/DL
HGB BLD-MCNC: 14.6 G/DL
IMM GRANULOCYTES NFR BLD AUTO: 0.3 %
LDLC SERPL CALC-MCNC: 80 MG/DL
LYMPHOCYTES # BLD AUTO: 2.02 K/UL
LYMPHOCYTES NFR BLD AUTO: 33.7 %
MAN DIFF?: NORMAL
MCHC RBC-ENTMCNC: 26.9 PG
MCHC RBC-ENTMCNC: 32.7 GM/DL
MCV RBC AUTO: 82.5 FL
MONOCYTES # BLD AUTO: 0.38 K/UL
MONOCYTES NFR BLD AUTO: 6.3 %
NEUTROPHILS # BLD AUTO: 3.46 K/UL
NEUTROPHILS NFR BLD AUTO: 57.9 %
PLATELET # BLD AUTO: 291 K/UL
POTASSIUM SERPL-SCNC: 4.8 MMOL/L
PROT SERPL-MCNC: 7.8 G/DL
RBC # BLD: 5.42 M/UL
RBC # FLD: 14 %
SAVE SPECIMEN: NORMAL
SODIUM SERPL-SCNC: 142 MMOL/L
T3RU NFR SERPL: 0.97 INDEX
T4 SERPL-MCNC: 5.6 UG/DL
TRIGL SERPL-MCNC: 142 MG/DL
TSH SERPL-ACNC: 1.36 UIU/ML
URATE SERPL-MCNC: 6.6 MG/DL
WBC # FLD AUTO: 5.99 K/UL

## 2017-08-01 PROCEDURE — 99214 OFFICE O/P EST MOD 30 MIN: CPT | Mod: 25

## 2017-08-01 PROCEDURE — 93000 ELECTROCARDIOGRAM COMPLETE: CPT

## 2017-08-01 PROCEDURE — 81003 URINALYSIS AUTO W/O SCOPE: CPT | Mod: QW

## 2017-08-01 PROCEDURE — 36415 COLL VENOUS BLD VENIPUNCTURE: CPT

## 2017-08-05 LAB
25(OH)D3 SERPL-MCNC: 27.9 NG/ML
BILIRUB UR QL STRIP: NORMAL
CLARITY UR: CLEAR
COLLECTION METHOD: NORMAL
GLUCOSE SERPL-MCNC: 117 MG/DL
GLUCOSE UR-MCNC: NORMAL
HBA1C MFR BLD HPLC: 8.2 %
HCG UR QL: 0.2 EU/DL
HGB UR QL STRIP.AUTO: NORMAL
KETONES UR-MCNC: NORMAL
LEUKOCYTE ESTERASE UR QL STRIP: NORMAL
NITRITE UR QL STRIP: NORMAL
PH UR STRIP: 6
PROT UR STRIP-MCNC: NORMAL
SP GR UR STRIP: 1.01

## 2017-08-10 ENCOUNTER — APPOINTMENT (OUTPATIENT)
Dept: UROLOGY | Facility: CLINIC | Age: 52
End: 2017-08-10
Payer: COMMERCIAL

## 2017-08-10 VITALS
TEMPERATURE: 98 F | HEART RATE: 76 BPM | SYSTOLIC BLOOD PRESSURE: 162 MMHG | DIASTOLIC BLOOD PRESSURE: 93 MMHG | WEIGHT: 156 LBS | BODY MASS INDEX: 25.07 KG/M2 | HEIGHT: 66 IN | RESPIRATION RATE: 15 BRPM

## 2017-08-10 DIAGNOSIS — N52.9 MALE ERECTILE DYSFUNCTION, UNSPECIFIED: ICD-10-CM

## 2017-08-10 PROCEDURE — 99204 OFFICE O/P NEW MOD 45 MIN: CPT

## 2017-08-12 LAB
APPEARANCE: CLEAR
BACTERIA UR CULT: NORMAL
BACTERIA: NEGATIVE
BILIRUBIN URINE: NEGATIVE
BLOOD URINE: NEGATIVE
COLOR: YELLOW
CORE LAB FLUID CYTOLOGY: NORMAL
GLUCOSE QUALITATIVE U: NORMAL MG/DL
KETONES URINE: NEGATIVE
LEUKOCYTE ESTERASE URINE: NEGATIVE
MICROSCOPIC-UA: NORMAL
NITRITE URINE: NEGATIVE
PH URINE: 6.5
PROTEIN URINE: NEGATIVE MG/DL
RED BLOOD CELLS URINE: 1 /HPF
SPECIFIC GRAVITY URINE: 1.01
SQUAMOUS EPITHELIAL CELLS: 0 /HPF
UROBILINOGEN URINE: NORMAL MG/DL
WHITE BLOOD CELLS URINE: 0 /HPF

## 2017-08-17 ENCOUNTER — RX RENEWAL (OUTPATIENT)
Age: 52
End: 2017-08-17

## 2017-08-21 ENCOUNTER — APPOINTMENT (OUTPATIENT)
Dept: MRI IMAGING | Facility: IMAGING CENTER | Age: 52
End: 2017-08-21
Payer: COMMERCIAL

## 2017-08-21 ENCOUNTER — OUTPATIENT (OUTPATIENT)
Dept: OUTPATIENT SERVICES | Facility: HOSPITAL | Age: 52
LOS: 1 days | End: 2017-08-21
Payer: COMMERCIAL

## 2017-08-21 DIAGNOSIS — Z00.8 ENCOUNTER FOR OTHER GENERAL EXAMINATION: ICD-10-CM

## 2017-08-21 DIAGNOSIS — K60.3 ANAL FISTULA: Chronic | ICD-10-CM

## 2017-08-21 DIAGNOSIS — S96.912S STRAIN OF UNSPECIFIED MUSCLE AND TENDON AT ANKLE AND FOOT LEVEL, LEFT FOOT, SEQUELA: Chronic | ICD-10-CM

## 2017-08-21 PROCEDURE — 70551 MRI BRAIN STEM W/O DYE: CPT

## 2017-08-21 PROCEDURE — 70551 MRI BRAIN STEM W/O DYE: CPT | Mod: 26

## 2017-08-31 ENCOUNTER — APPOINTMENT (OUTPATIENT)
Dept: UROLOGY | Facility: CLINIC | Age: 52
End: 2017-08-31
Payer: COMMERCIAL

## 2017-08-31 DIAGNOSIS — J30.2 OTHER SEASONAL ALLERGIC RHINITIS: ICD-10-CM

## 2017-08-31 DIAGNOSIS — R39.9 UNSPECIFIED SYMPTOMS AND SIGNS INVOLVING THE GENITOURINARY SYSTEM: ICD-10-CM

## 2017-08-31 DIAGNOSIS — F52.0 HYPOACTIVE SEXUAL DESIRE DISORDER: ICD-10-CM

## 2017-08-31 PROCEDURE — 99214 OFFICE O/P EST MOD 30 MIN: CPT

## 2017-09-01 ENCOUNTER — RX RENEWAL (OUTPATIENT)
Age: 52
End: 2017-09-01

## 2017-09-03 LAB
ALBUMIN SERPL ELPH-MCNC: 4.2 G/DL
ALP BLD-CCNC: 50 U/L
ALT SERPL-CCNC: 17 U/L
ANION GAP SERPL CALC-SCNC: 15 MMOL/L
APPEARANCE: CLEAR
AST SERPL-CCNC: 12 U/L
BACTERIA UR CULT: NORMAL
BACTERIA: NEGATIVE
BASOPHILS # BLD AUTO: 0.02 K/UL
BASOPHILS NFR BLD AUTO: 0.4 %
BILIRUB SERPL-MCNC: 0.4 MG/DL
BILIRUBIN URINE: NEGATIVE
BLOOD URINE: NEGATIVE
BUN SERPL-MCNC: 12 MG/DL
CALCIUM SERPL-MCNC: 9.3 MG/DL
CHLORIDE SERPL-SCNC: 103 MMOL/L
CO2 SERPL-SCNC: 25 MMOL/L
COLOR: YELLOW
CORE LAB FLUID CYTOLOGY: NORMAL
CREAT SERPL-MCNC: 0.94 MG/DL
EOSINOPHIL # BLD AUTO: 0.09 K/UL
EOSINOPHIL NFR BLD AUTO: 1.7 %
ESTRADIOL SERPL-MCNC: 18 PG/ML
GLUCOSE QUALITATIVE U: NORMAL MG/DL
GLUCOSE SERPL-MCNC: 142 MG/DL
HCT VFR BLD CALC: 42.2 %
HGB BLD-MCNC: 13.8 G/DL
HYALINE CASTS: 0 /LPF
IMM GRANULOCYTES NFR BLD AUTO: 0.2 %
KETONES URINE: NEGATIVE
LEUKOCYTE ESTERASE URINE: NEGATIVE
LYMPHOCYTES # BLD AUTO: 1.86 K/UL
LYMPHOCYTES NFR BLD AUTO: 34.5 %
MAN DIFF?: NORMAL
MCHC RBC-ENTMCNC: 27.1 PG
MCHC RBC-ENTMCNC: 32.7 GM/DL
MCV RBC AUTO: 82.7 FL
MICROSCOPIC-UA: NORMAL
MONOCYTES # BLD AUTO: 0.41 K/UL
MONOCYTES NFR BLD AUTO: 7.6 %
NEUTROPHILS # BLD AUTO: 3 K/UL
NEUTROPHILS NFR BLD AUTO: 55.6 %
NITRITE URINE: NEGATIVE
PH URINE: 6.5
PLATELET # BLD AUTO: 307 K/UL
POTASSIUM SERPL-SCNC: 4.5 MMOL/L
PROLACTIN SERPL-MCNC: 11.3 NG/ML
PROT SERPL-MCNC: 7.1 G/DL
PROTEIN URINE: NEGATIVE MG/DL
RBC # BLD: 5.1 M/UL
RBC # FLD: 14.5 %
RED BLOOD CELLS URINE: 1 /HPF
SODIUM SERPL-SCNC: 143 MMOL/L
SPECIFIC GRAVITY URINE: 1.01
SQUAMOUS EPITHELIAL CELLS: 0 /HPF
TESTOST SERPL-MCNC: 315.6 NG/DL
UROBILINOGEN URINE: NORMAL MG/DL
WBC # FLD AUTO: 5.39 K/UL
WHITE BLOOD CELLS URINE: 0 /HPF

## 2017-09-28 ENCOUNTER — RX RENEWAL (OUTPATIENT)
Age: 52
End: 2017-09-28

## 2017-10-02 ENCOUNTER — RX RENEWAL (OUTPATIENT)
Age: 52
End: 2017-10-02

## 2017-10-16 ENCOUNTER — APPOINTMENT (OUTPATIENT)
Dept: ENDOCRINOLOGY | Facility: CLINIC | Age: 52
End: 2017-10-16

## 2017-10-31 ENCOUNTER — NON-APPOINTMENT (OUTPATIENT)
Age: 52
End: 2017-10-31

## 2017-10-31 ENCOUNTER — LABORATORY RESULT (OUTPATIENT)
Age: 52
End: 2017-10-31

## 2017-10-31 ENCOUNTER — APPOINTMENT (OUTPATIENT)
Dept: INTERNAL MEDICINE | Facility: CLINIC | Age: 52
End: 2017-10-31
Payer: COMMERCIAL

## 2017-10-31 VITALS
DIASTOLIC BLOOD PRESSURE: 80 MMHG | BODY MASS INDEX: 23.63 KG/M2 | HEIGHT: 66 IN | SYSTOLIC BLOOD PRESSURE: 130 MMHG | WEIGHT: 147 LBS

## 2017-10-31 DIAGNOSIS — R00.0 TACHYCARDIA, UNSPECIFIED: ICD-10-CM

## 2017-10-31 DIAGNOSIS — I77.9 DISORDER OF ARTERIES AND ARTERIOLES, UNSPECIFIED: ICD-10-CM

## 2017-10-31 LAB
BASOPHILS # BLD AUTO: 0.03 K/UL
BASOPHILS NFR BLD AUTO: 0.5 %
BILIRUB UR QL STRIP: NORMAL
CLARITY UR: CLEAR
COLLECTION METHOD: NORMAL
DATE COLLECTED: NORMAL
EOSINOPHIL # BLD AUTO: 0.19 K/UL
EOSINOPHIL NFR BLD AUTO: 3.3 %
FERRITIN SERPL-MCNC: 76 NG/ML
GLUCOSE SERPL-MCNC: 118 MG/DL
GLUCOSE UR-MCNC: NORMAL
HBA1C MFR BLD HPLC: 6.6 %
HCG UR QL: 0.2 EU/DL
HCT VFR BLD CALC: 44.6 %
HEMOCCULT SP1 STL QL: NEGATIVE
HGB BLD-MCNC: 14.6 G/DL
HGB UR QL STRIP.AUTO: NORMAL
IMM GRANULOCYTES NFR BLD AUTO: 0.2 %
KETONES UR-MCNC: NORMAL
LEUKOCYTE ESTERASE UR QL STRIP: NORMAL
LYMPHOCYTES # BLD AUTO: 1.78 K/UL
LYMPHOCYTES NFR BLD AUTO: 31.1 %
MAN DIFF?: NORMAL
MCHC RBC-ENTMCNC: 27.4 PG
MCHC RBC-ENTMCNC: 32.7 GM/DL
MCV RBC AUTO: 83.8 FL
MONOCYTES # BLD AUTO: 0.45 K/UL
MONOCYTES NFR BLD AUTO: 7.9 %
NEUTROPHILS # BLD AUTO: 3.27 K/UL
NEUTROPHILS NFR BLD AUTO: 57 %
NITRITE UR QL STRIP: NORMAL
PH UR STRIP: 6.5
PLATELET # BLD AUTO: 256 K/UL
PROT UR STRIP-MCNC: NORMAL
PSA SERPL-MCNC: 1.62 NG/ML
QUALITY CONTROL: YES
RBC # BLD: 5.32 M/UL
RBC # FLD: 14.1 %
SAVE SPECIMEN: NORMAL
SP GR UR STRIP: 1.02
T3RU NFR SERPL: 0.95 INDEX
T4 SERPL-MCNC: 5.6 UG/DL
TSH SERPL-ACNC: 1.52 UIU/ML
VIT B12 SERPL-MCNC: 643 PG/ML
WBC # FLD AUTO: 5.73 K/UL

## 2017-10-31 PROCEDURE — 81003 URINALYSIS AUTO W/O SCOPE: CPT | Mod: QW

## 2017-10-31 PROCEDURE — G0009: CPT

## 2017-10-31 PROCEDURE — 93000 ELECTROCARDIOGRAM COMPLETE: CPT

## 2017-10-31 PROCEDURE — 36415 COLL VENOUS BLD VENIPUNCTURE: CPT

## 2017-10-31 PROCEDURE — 99396 PREV VISIT EST AGE 40-64: CPT | Mod: 25

## 2017-10-31 PROCEDURE — 90732 PPSV23 VACC 2 YRS+ SUBQ/IM: CPT

## 2017-11-01 LAB
25(OH)D3 SERPL-MCNC: 21.1 NG/ML
ALBUMIN SERPL ELPH-MCNC: 4.4 G/DL
ALP BLD-CCNC: 49 U/L
ALT SERPL-CCNC: 20 U/L
ANION GAP SERPL CALC-SCNC: 15 MMOL/L
AST SERPL-CCNC: 21 U/L
BILIRUB SERPL-MCNC: 0.4 MG/DL
BUN SERPL-MCNC: 10 MG/DL
CALCIUM SERPL-MCNC: 9.9 MG/DL
CHLORIDE SERPL-SCNC: 99 MMOL/L
CHOLEST SERPL-MCNC: 210 MG/DL
CHOLEST/HDLC SERPL: 4.4 RATIO
CO2 SERPL-SCNC: 25 MMOL/L
CREAT SERPL-MCNC: 0.87 MG/DL
HDLC SERPL-MCNC: 48 MG/DL
LDLC SERPL CALC-MCNC: 116 MG/DL
POTASSIUM SERPL-SCNC: 4.3 MMOL/L
PROT SERPL-MCNC: 7.8 G/DL
SODIUM SERPL-SCNC: 139 MMOL/L
TRIGL SERPL-MCNC: 231 MG/DL
URATE SERPL-MCNC: 7.3 MG/DL

## 2017-11-18 NOTE — DISCHARGE NOTE ADULT - MEDICATION SUMMARY - MEDICATIONS TO TAKE
I will START or STAY ON the medications listed below when I get home from the hospital:    metFORMIN 1000 mg oral tablet  -- 1 tab(s) by mouth 2 times a day  -- Indication: For Diabetes mellitus, type 2    glimepiride 2 mg oral tablet  -- 1 tab(s) by mouth once a day  -- Indication: For Diabetes mellitus, type 2    Protonix 40 mg oral granule, enteric coated  -- 1 each by mouth once a day  -- It is very important that you take or use this exactly as directed.  Do not skip doses or discontinue unless directed by your doctor.  Obtain medical advice before taking any non-prescription drugs as some may affect the action of this medication.    -- Indication: For ACid Reflux Patent

## 2017-11-27 ENCOUNTER — APPOINTMENT (OUTPATIENT)
Dept: ENDOCRINOLOGY | Facility: CLINIC | Age: 52
End: 2017-11-27

## 2017-11-27 ENCOUNTER — APPOINTMENT (OUTPATIENT)
Dept: UROLOGY | Facility: CLINIC | Age: 52
End: 2017-11-27

## 2017-12-28 ENCOUNTER — RX RENEWAL (OUTPATIENT)
Age: 52
End: 2017-12-28

## 2017-12-28 ENCOUNTER — APPOINTMENT (OUTPATIENT)
Dept: INTERNAL MEDICINE | Facility: CLINIC | Age: 52
End: 2017-12-28

## 2017-12-28 VITALS — BODY MASS INDEX: 24.43 KG/M2 | WEIGHT: 152 LBS | HEIGHT: 66 IN

## 2017-12-28 RX ORDER — CLOBETASOL PROPIONATE 0.5 MG/G
0.05 AEROSOL, FOAM TOPICAL
Qty: 50 | Refills: 0 | Status: ACTIVE | COMMUNITY
Start: 2017-11-07

## 2018-02-16 ENCOUNTER — LABORATORY RESULT (OUTPATIENT)
Age: 53
End: 2018-02-16

## 2018-02-16 ENCOUNTER — APPOINTMENT (OUTPATIENT)
Dept: INTERNAL MEDICINE | Facility: CLINIC | Age: 53
End: 2018-02-16
Payer: COMMERCIAL

## 2018-02-16 VITALS — WEIGHT: 153 LBS | BODY MASS INDEX: 24.59 KG/M2 | HEIGHT: 66 IN

## 2018-02-16 VITALS — DIASTOLIC BLOOD PRESSURE: 80 MMHG | SYSTOLIC BLOOD PRESSURE: 120 MMHG

## 2018-02-16 PROCEDURE — 36415 COLL VENOUS BLD VENIPUNCTURE: CPT

## 2018-02-16 PROCEDURE — 99214 OFFICE O/P EST MOD 30 MIN: CPT | Mod: 25

## 2018-02-16 RX ORDER — CICLOPIROX OLAMINE 7.7 MG/ML
0.77 SUSPENSION TOPICAL
Qty: 60 | Refills: 0 | Status: ACTIVE | COMMUNITY
Start: 2017-11-07

## 2018-02-17 ENCOUNTER — CLINICAL ADVICE (OUTPATIENT)
Age: 53
End: 2018-02-17

## 2018-02-17 LAB
25(OH)D3 SERPL-MCNC: 16.2 NG/ML
ALBUMIN SERPL ELPH-MCNC: 4.4 G/DL
ALP BLD-CCNC: 60 U/L
ALT SERPL-CCNC: 19 U/L
ANION GAP SERPL CALC-SCNC: 16 MMOL/L
AST SERPL-CCNC: 14 U/L
BASOPHILS # BLD AUTO: 0.04 K/UL
BASOPHILS NFR BLD AUTO: 0.7 %
BILIRUB SERPL-MCNC: 0.6 MG/DL
BUN SERPL-MCNC: 14 MG/DL
CALCIUM SERPL-MCNC: 9.9 MG/DL
CHLORIDE SERPL-SCNC: 99 MMOL/L
CHOLEST SERPL-MCNC: 223 MG/DL
CHOLEST/HDLC SERPL: 4.3 RATIO
CO2 SERPL-SCNC: 26 MMOL/L
CREAT SERPL-MCNC: 1.05 MG/DL
EOSINOPHIL # BLD AUTO: 0.1 K/UL
EOSINOPHIL NFR BLD AUTO: 1.7 %
GLUCOSE SERPL-MCNC: 146 MG/DL
HBA1C MFR BLD HPLC: 7.5 %
HCT VFR BLD CALC: 45.8 %
HDLC SERPL-MCNC: 52 MG/DL
HGB BLD-MCNC: 15 G/DL
IMM GRANULOCYTES NFR BLD AUTO: 0.2 %
LDLC SERPL CALC-MCNC: NORMAL
LYMPHOCYTES # BLD AUTO: 1.98 K/UL
LYMPHOCYTES NFR BLD AUTO: 33.3 %
MAN DIFF?: NORMAL
MCHC RBC-ENTMCNC: 28 PG
MCHC RBC-ENTMCNC: 32.8 GM/DL
MCV RBC AUTO: 85.4 FL
MONOCYTES # BLD AUTO: 0.39 K/UL
MONOCYTES NFR BLD AUTO: 6.6 %
NEUTROPHILS # BLD AUTO: 3.42 K/UL
NEUTROPHILS NFR BLD AUTO: 57.5 %
PLATELET # BLD AUTO: 313 K/UL
POTASSIUM SERPL-SCNC: 4.4 MMOL/L
PROT SERPL-MCNC: 7.8 G/DL
PSA SERPL-MCNC: 1.66 NG/ML
RBC # BLD: 5.36 M/UL
RBC # FLD: 13.5 %
SAVE SPECIMEN: NORMAL
SODIUM SERPL-SCNC: 141 MMOL/L
T3RU NFR SERPL: 0.95 INDEX
T4 SERPL-MCNC: 6.2 UG/DL
TRIGL SERPL-MCNC: 460 MG/DL
TSH SERPL-ACNC: 1.03 UIU/ML
URATE SERPL-MCNC: 8.3 MG/DL
WBC # FLD AUTO: 5.94 K/UL

## 2018-03-19 ENCOUNTER — APPOINTMENT (OUTPATIENT)
Dept: ENDOCRINOLOGY | Facility: CLINIC | Age: 53
End: 2018-03-19

## 2018-05-14 ENCOUNTER — APPOINTMENT (OUTPATIENT)
Dept: INTERNAL MEDICINE | Facility: CLINIC | Age: 53
End: 2018-05-14

## 2018-06-28 ENCOUNTER — RX RENEWAL (OUTPATIENT)
Age: 53
End: 2018-06-28

## 2018-07-17 ENCOUNTER — RX RENEWAL (OUTPATIENT)
Age: 53
End: 2018-07-17

## 2018-07-23 ENCOUNTER — RX RENEWAL (OUTPATIENT)
Age: 53
End: 2018-07-23

## 2018-07-23 ENCOUNTER — MEDICATION RENEWAL (OUTPATIENT)
Age: 53
End: 2018-07-23

## 2018-07-23 RX ORDER — DULAGLUTIDE 0.75 MG/.5ML
0.75 INJECTION, SOLUTION SUBCUTANEOUS
Qty: 2 | Refills: 0 | Status: DISCONTINUED | COMMUNITY
Start: 2017-06-12 | End: 2018-07-23

## 2018-09-27 ENCOUNTER — LABORATORY RESULT (OUTPATIENT)
Age: 53
End: 2018-09-27

## 2018-09-27 ENCOUNTER — MEDICATION RENEWAL (OUTPATIENT)
Age: 53
End: 2018-09-27

## 2018-09-27 ENCOUNTER — APPOINTMENT (OUTPATIENT)
Dept: INTERNAL MEDICINE | Facility: CLINIC | Age: 53
End: 2018-09-27
Payer: COMMERCIAL

## 2018-09-27 VITALS — WEIGHT: 149 LBS | HEIGHT: 66 IN | BODY MASS INDEX: 23.95 KG/M2

## 2018-09-27 VITALS — DIASTOLIC BLOOD PRESSURE: 70 MMHG | SYSTOLIC BLOOD PRESSURE: 130 MMHG

## 2018-09-27 DIAGNOSIS — N40.1 BENIGN PROSTATIC HYPERPLASIA WITH LOWER URINARY TRACT SYMPMS: ICD-10-CM

## 2018-09-27 DIAGNOSIS — N13.8 BENIGN PROSTATIC HYPERPLASIA WITH LOWER URINARY TRACT SYMPMS: ICD-10-CM

## 2018-09-27 DIAGNOSIS — M10.9 GOUT, UNSPECIFIED: ICD-10-CM

## 2018-09-27 DIAGNOSIS — G47.00 INSOMNIA, UNSPECIFIED: ICD-10-CM

## 2018-09-27 LAB
ALBUMIN SERPL ELPH-MCNC: 4.4 G/DL
ALP BLD-CCNC: 57 U/L
ALT SERPL-CCNC: 18 U/L
ANION GAP SERPL CALC-SCNC: 15 MMOL/L
AST SERPL-CCNC: 10 U/L
BASOPHILS # BLD AUTO: 0.02 K/UL
BASOPHILS NFR BLD AUTO: 0.3 %
BILIRUB SERPL-MCNC: 0.9 MG/DL
BILIRUB UR QL STRIP: NEGATIVE
BUN SERPL-MCNC: 9 MG/DL
CALCIUM SERPL-MCNC: 9.8 MG/DL
CHLORIDE SERPL-SCNC: 98 MMOL/L
CHOLEST SERPL-MCNC: 185 MG/DL
CHOLEST/HDLC SERPL: 3.6 RATIO
CLARITY UR: CLEAR
CO2 SERPL-SCNC: 27 MMOL/L
COLLECTION METHOD: NORMAL
CREAT SERPL-MCNC: 0.99 MG/DL
EOSINOPHIL # BLD AUTO: 0.05 K/UL
EOSINOPHIL NFR BLD AUTO: 0.7 %
GLUCOSE SERPL-MCNC: 156 MG/DL
GLUCOSE UR-MCNC: NEGATIVE
HBA1C MFR BLD HPLC: 7.8 %
HCG UR QL: 0.2 EU/DL
HCT VFR BLD CALC: 47.9 %
HDLC SERPL-MCNC: 51 MG/DL
HGB BLD-MCNC: 15.3 G/DL
HGB UR QL STRIP.AUTO: NEGATIVE
IMM GRANULOCYTES NFR BLD AUTO: 0.3 %
KETONES UR-MCNC: NEGATIVE
LDLC SERPL CALC-MCNC: 91 MG/DL
LEUKOCYTE ESTERASE UR QL STRIP: NEGATIVE
LYMPHOCYTES # BLD AUTO: 2.73 K/UL
LYMPHOCYTES NFR BLD AUTO: 36.5 %
MAN DIFF?: NORMAL
MCHC RBC-ENTMCNC: 27.2 PG
MCHC RBC-ENTMCNC: 31.9 GM/DL
MCV RBC AUTO: 85.2 FL
MONOCYTES # BLD AUTO: 0.66 K/UL
MONOCYTES NFR BLD AUTO: 8.8 %
NEUTROPHILS # BLD AUTO: 4 K/UL
NEUTROPHILS NFR BLD AUTO: 53.4 %
NITRITE UR QL STRIP: NEGATIVE
PH UR STRIP: 6
PLATELET # BLD AUTO: 301 K/UL
POTASSIUM SERPL-SCNC: 4.5 MMOL/L
PROT SERPL-MCNC: 7.5 G/DL
PROT UR STRIP-MCNC: NEGATIVE
RBC # BLD: 5.62 M/UL
RBC # FLD: 14 %
SAVE SPECIMEN: NORMAL
SODIUM SERPL-SCNC: 140 MMOL/L
SP GR UR STRIP: 1.01
TRIGL SERPL-MCNC: 216 MG/DL
URATE SERPL-MCNC: 7.4 MG/DL
WBC # FLD AUTO: 7.48 K/UL

## 2018-09-27 PROCEDURE — 81003 URINALYSIS AUTO W/O SCOPE: CPT | Mod: QW

## 2018-09-27 PROCEDURE — 99214 OFFICE O/P EST MOD 30 MIN: CPT | Mod: 25

## 2018-09-27 PROCEDURE — 36415 COLL VENOUS BLD VENIPUNCTURE: CPT

## 2018-09-27 RX ORDER — ALFUZOSIN HYDROCHLORIDE 10 MG/1
10 TABLET, EXTENDED RELEASE ORAL
Refills: 3 | Status: DISCONTINUED | COMMUNITY
Start: 2017-08-10 | End: 2018-09-27

## 2018-09-27 RX ORDER — SILDENAFIL 20 MG/1
20 TABLET ORAL
Qty: 30 | Refills: 11 | Status: DISCONTINUED | COMMUNITY
Start: 2017-08-10 | End: 2018-09-27

## 2018-09-27 NOTE — REVIEW OF SYSTEMS
[Fatigue] : fatigue [Vision Problems] : vision problems [Hesitancy] : hesitancy [Nocturia] : nocturia [Frequency] : frequency [Impotence] : impotence [Poor Libido] : poor libido [Itching] : itching [Insomnia] : insomnia [Negative] : Neurological

## 2018-09-27 NOTE — ASSESSMENT
[FreeTextEntry1] : The patient's blood pressure was stable. His physical examination with pulses was normal. The patient was told to continue his enalapril, cholesterol pill and his aspirin and return in 6 weeks to recheck his pressure

## 2018-09-27 NOTE — HISTORY OF PRESENT ILLNESS
[de-identified] : This is a 53-year-old gentleman with a history of diabetes mellitus, hypertension, gout, hyperlipidemia who is here for a followup visit.

## 2018-09-27 NOTE — PHYSICAL EXAM
[No JVD] : no jugular venous distention [Supple] : supple [No Lymphadenopathy] : no lymphadenopathy [No Respiratory Distress] : no respiratory distress  [Clear to Auscultation] : lungs were clear to auscultation bilaterally [No Accessory Muscle Use] : no accessory muscle use [Normal Rate] : normal rate  [Regular Rhythm] : with a regular rhythm [Normal S1, S2] : normal S1 and S2 [No Abdominal Bruit] : a ~M bruit was not heard ~T in the abdomen [No Varicosities] : no varicosities [No Extremity Clubbing/Cyanosis] : no extremity clubbing/cyanosis [No Palpable Aorta] : no palpable aorta [Soft] : abdomen soft [Non Tender] : non-tender [Non-distended] : non-distended [No HSM] : no HSM [Normal Bowel Sounds] : normal bowel sounds [No Hernias] : no hernias

## 2018-09-28 ENCOUNTER — CLINICAL ADVICE (OUTPATIENT)
Age: 53
End: 2018-09-28

## 2018-09-28 ENCOUNTER — MEDICATION RENEWAL (OUTPATIENT)
Age: 53
End: 2018-09-28

## 2018-09-28 LAB
25(OH)D3 SERPL-MCNC: 23 NG/ML
PSA SERPL-MCNC: 1.78 NG/ML
T3RU NFR SERPL: 0.9 INDEX
T4 SERPL-MCNC: 6.8 UG/DL
TSH SERPL-ACNC: 1.71 UIU/ML

## 2018-10-04 ENCOUNTER — CLINICAL ADVICE (OUTPATIENT)
Age: 53
End: 2018-10-04

## 2018-10-06 ENCOUNTER — APPOINTMENT (OUTPATIENT)
Dept: MRI IMAGING | Facility: CLINIC | Age: 53
End: 2018-10-06
Payer: COMMERCIAL

## 2018-10-06 ENCOUNTER — OUTPATIENT (OUTPATIENT)
Dept: OUTPATIENT SERVICES | Facility: HOSPITAL | Age: 53
LOS: 1 days | End: 2018-10-06
Payer: COMMERCIAL

## 2018-10-06 DIAGNOSIS — K60.3 ANAL FISTULA: Chronic | ICD-10-CM

## 2018-10-06 DIAGNOSIS — S96.912S STRAIN OF UNSPECIFIED MUSCLE AND TENDON AT ANKLE AND FOOT LEVEL, LEFT FOOT, SEQUELA: Chronic | ICD-10-CM

## 2018-10-06 DIAGNOSIS — G54.2 CERVICAL ROOT DISORDERS, NOT ELSEWHERE CLASSIFIED: ICD-10-CM

## 2018-10-06 PROCEDURE — 70544 MR ANGIOGRAPHY HEAD W/O DYE: CPT

## 2018-10-06 PROCEDURE — 72141 MRI NECK SPINE W/O DYE: CPT

## 2018-10-06 PROCEDURE — 70551 MRI BRAIN STEM W/O DYE: CPT

## 2018-10-08 PROCEDURE — 70544 MR ANGIOGRAPHY HEAD W/O DYE: CPT | Mod: 26,59

## 2018-10-08 PROCEDURE — 70551 MRI BRAIN STEM W/O DYE: CPT | Mod: 26

## 2018-10-08 PROCEDURE — 72141 MRI NECK SPINE W/O DYE: CPT | Mod: 26

## 2018-10-22 ENCOUNTER — APPOINTMENT (OUTPATIENT)
Dept: ENDOCRINOLOGY | Facility: CLINIC | Age: 53
End: 2018-10-22
Payer: COMMERCIAL

## 2018-10-22 VITALS
HEART RATE: 101 BPM | BODY MASS INDEX: 24.27 KG/M2 | HEIGHT: 66 IN | OXYGEN SATURATION: 98 % | WEIGHT: 151 LBS | DIASTOLIC BLOOD PRESSURE: 92 MMHG | SYSTOLIC BLOOD PRESSURE: 142 MMHG

## 2018-10-22 PROCEDURE — 99215 OFFICE O/P EST HI 40 MIN: CPT | Mod: GC

## 2018-10-26 LAB
CREAT SPEC-SCNC: 112 MG/DL
MICROALBUMIN 24H UR DL<=1MG/L-MCNC: <1.2 MG/DL
MICROALBUMIN/CREAT 24H UR-RTO: NORMAL

## 2018-11-08 ENCOUNTER — APPOINTMENT (OUTPATIENT)
Dept: INTERNAL MEDICINE | Facility: CLINIC | Age: 53
End: 2018-11-08

## 2018-11-21 ENCOUNTER — RX RENEWAL (OUTPATIENT)
Age: 53
End: 2018-11-21

## 2019-01-18 ENCOUNTER — APPOINTMENT (OUTPATIENT)
Dept: MRI IMAGING | Facility: CLINIC | Age: 54
End: 2019-01-18

## 2019-01-24 ENCOUNTER — OUTPATIENT (OUTPATIENT)
Dept: OUTPATIENT SERVICES | Facility: HOSPITAL | Age: 54
LOS: 1 days | End: 2019-01-24
Payer: COMMERCIAL

## 2019-01-24 ENCOUNTER — APPOINTMENT (OUTPATIENT)
Dept: MRI IMAGING | Facility: IMAGING CENTER | Age: 54
End: 2019-01-24
Payer: COMMERCIAL

## 2019-01-24 ENCOUNTER — RX RENEWAL (OUTPATIENT)
Age: 54
End: 2019-01-24

## 2019-01-24 DIAGNOSIS — S96.912S STRAIN OF UNSPECIFIED MUSCLE AND TENDON AT ANKLE AND FOOT LEVEL, LEFT FOOT, SEQUELA: Chronic | ICD-10-CM

## 2019-01-24 DIAGNOSIS — K60.3 ANAL FISTULA: Chronic | ICD-10-CM

## 2019-01-24 DIAGNOSIS — Z00.8 ENCOUNTER FOR OTHER GENERAL EXAMINATION: ICD-10-CM

## 2019-01-24 PROCEDURE — 72148 MRI LUMBAR SPINE W/O DYE: CPT

## 2019-01-24 PROCEDURE — 72148 MRI LUMBAR SPINE W/O DYE: CPT | Mod: 26

## 2019-01-29 ENCOUNTER — APPOINTMENT (OUTPATIENT)
Dept: ENDOCRINOLOGY | Facility: CLINIC | Age: 54
End: 2019-01-29

## 2019-01-29 ENCOUNTER — RX RENEWAL (OUTPATIENT)
Age: 54
End: 2019-01-29

## 2019-02-05 ENCOUNTER — APPOINTMENT (OUTPATIENT)
Dept: ENDOCRINOLOGY | Facility: CLINIC | Age: 54
End: 2019-02-05

## 2019-03-11 ENCOUNTER — APPOINTMENT (OUTPATIENT)
Dept: ENDOCRINOLOGY | Facility: CLINIC | Age: 54
End: 2019-03-11

## 2019-04-24 ENCOUNTER — RX RENEWAL (OUTPATIENT)
Age: 54
End: 2019-04-24

## 2019-05-06 ENCOUNTER — NON-APPOINTMENT (OUTPATIENT)
Age: 54
End: 2019-05-06

## 2019-05-06 ENCOUNTER — LABORATORY RESULT (OUTPATIENT)
Age: 54
End: 2019-05-06

## 2019-05-06 ENCOUNTER — APPOINTMENT (OUTPATIENT)
Dept: INTERNAL MEDICINE | Facility: CLINIC | Age: 54
End: 2019-05-06
Payer: COMMERCIAL

## 2019-05-06 VITALS — WEIGHT: 149 LBS | BODY MASS INDEX: 23.95 KG/M2 | HEIGHT: 66 IN

## 2019-05-06 VITALS — DIASTOLIC BLOOD PRESSURE: 90 MMHG | SYSTOLIC BLOOD PRESSURE: 160 MMHG

## 2019-05-06 DIAGNOSIS — E55.9 VITAMIN D DEFICIENCY, UNSPECIFIED: ICD-10-CM

## 2019-05-06 LAB
BILIRUB UR QL STRIP: NORMAL
CLARITY UR: CLEAR
COLLECTION METHOD: NORMAL
GLUCOSE UR-MCNC: NORMAL
HCG UR QL: 0.2 EU/DL
HGB UR QL STRIP.AUTO: NORMAL
KETONES UR-MCNC: NORMAL
LEUKOCYTE ESTERASE UR QL STRIP: NORMAL
NITRITE UR QL STRIP: NORMAL
PH UR STRIP: 6.5
PROT UR STRIP-MCNC: NORMAL
SP GR UR STRIP: 1.02

## 2019-05-06 PROCEDURE — 36415 COLL VENOUS BLD VENIPUNCTURE: CPT

## 2019-05-06 PROCEDURE — 93000 ELECTROCARDIOGRAM COMPLETE: CPT

## 2019-05-06 PROCEDURE — 81003 URINALYSIS AUTO W/O SCOPE: CPT | Mod: QW

## 2019-05-06 PROCEDURE — 99214 OFFICE O/P EST MOD 30 MIN: CPT | Mod: 25

## 2019-05-06 RX ORDER — ENALAPRIL MALEATE 5 MG/1
5 TABLET ORAL DAILY
Qty: 90 | Refills: 3 | Status: DISCONTINUED | COMMUNITY
Start: 2018-09-27 | End: 2019-05-06

## 2019-05-06 NOTE — ASSESSMENT
[FreeTextEntry1] : Problems\par Hypertension\par Type 2 diabetes mellitus\par \par Hypercholesterolemia\par Status post TIA\par Thyromegaly\par Papular rash\par Assessment\par This is a 53-year-old gentleman with a history of hypertension diabetes hypercholesterolemia and status post TIA who decided to stop his enalapril initiated today for a blood pressure control and also complaining of a papular rash on the scalp and arms. His physical examination is significant for his blood pressure being 160/90 with a mild sinus tachycardia and a papular rash. I started the patient on Micardis 40 mg daily and discontinue his enalapril. I also referred him to dermatology for evaluation

## 2019-05-06 NOTE — PHYSICAL EXAM
[No Lymphadenopathy] : no lymphadenopathy [Supple] : supple [Clear to Auscultation] : lungs were clear to auscultation bilaterally [No Respiratory Distress] : no respiratory distress  [No Accessory Muscle Use] : no accessory muscle use [Regular Rhythm] : with a regular rhythm [No Murmur] : no murmur heard [No Carotid Bruits] : no carotid bruits [Soft] : abdomen soft [de-identified] : Papular rash on his arms and scalp [No HSM] : no HSM

## 2019-05-06 NOTE — HISTORY OF PRESENT ILLNESS
[de-identified] : This is a 53-year-old gentleman with a history of diabetes mellitus, hypercholesterolemia, thyroid disorder, status post TIA, type 2 diabetes. The patient stopped his enalapril because she was getting laryngeal spasm laryngeal edema. However he did not call me or inform me that he was stopping his enalapril. In addition he is complaining of a rash today

## 2019-05-06 NOTE — REVIEW OF SYSTEMS
[Itching] : itching [Nasal Discharge] : nasal discharge [Skin Rash] : skin rash [Negative] : Neurological

## 2019-05-07 LAB
25(OH)D3 SERPL-MCNC: 21.2 NG/ML
ALBUMIN SERPL ELPH-MCNC: 4.4 G/DL
ALP BLD-CCNC: 49 U/L
ALT SERPL-CCNC: 26 U/L
ANION GAP SERPL CALC-SCNC: 15 MMOL/L
AST SERPL-CCNC: 15 U/L
BASOPHILS # BLD AUTO: 0.04 K/UL
BASOPHILS NFR BLD AUTO: 0.7 %
BILIRUB SERPL-MCNC: 0.6 MG/DL
BUN SERPL-MCNC: 14 MG/DL
CALCIUM SERPL-MCNC: 9.6 MG/DL
CHLORIDE SERPL-SCNC: 101 MMOL/L
CHOLEST SERPL-MCNC: 210 MG/DL
CHOLEST/HDLC SERPL: 3.5 RATIO
CO2 SERPL-SCNC: 23 MMOL/L
CREAT SERPL-MCNC: 0.85 MG/DL
EOSINOPHIL # BLD AUTO: 0.02 K/UL
EOSINOPHIL NFR BLD AUTO: 0.3 %
ESTIMATED AVERAGE GLUCOSE: 177 MG/DL
GLUCOSE SERPL-MCNC: 183 MG/DL
HBA1C MFR BLD HPLC: 7.8 %
HCT VFR BLD CALC: 47.2 %
HDLC SERPL-MCNC: 60 MG/DL
HGB BLD-MCNC: 14.6 G/DL
IMM GRANULOCYTES NFR BLD AUTO: 0.2 %
LDLC SERPL CALC-MCNC: 103 MG/DL
LYMPHOCYTES # BLD AUTO: 1.46 K/UL
LYMPHOCYTES NFR BLD AUTO: 24.3 %
MAN DIFF?: NORMAL
MCHC RBC-ENTMCNC: 27.4 PG
MCHC RBC-ENTMCNC: 30.9 GM/DL
MCV RBC AUTO: 88.7 FL
MONOCYTES # BLD AUTO: 0.3 K/UL
MONOCYTES NFR BLD AUTO: 5 %
NEUTROPHILS # BLD AUTO: 4.19 K/UL
NEUTROPHILS NFR BLD AUTO: 69.5 %
PLATELET # BLD AUTO: 315 K/UL
POTASSIUM SERPL-SCNC: 4.4 MMOL/L
PROT SERPL-MCNC: 7.1 G/DL
PSA SERPL-MCNC: 1.6 NG/ML
RBC # BLD: 5.32 M/UL
RBC # FLD: 14.1 %
SAVE SPECIMEN: NORMAL
SODIUM SERPL-SCNC: 139 MMOL/L
T3RU NFR SERPL: 0.9 TBI
T4 SERPL-MCNC: 5.1 UG/DL
TRIGL SERPL-MCNC: 233 MG/DL
TSH SERPL-ACNC: 1.16 UIU/ML
URATE SERPL-MCNC: 8 MG/DL
WBC # FLD AUTO: 6.02 K/UL

## 2019-05-28 ENCOUNTER — APPOINTMENT (OUTPATIENT)
Dept: INTERNAL MEDICINE | Facility: CLINIC | Age: 54
End: 2019-05-28

## 2019-06-17 ENCOUNTER — APPOINTMENT (OUTPATIENT)
Dept: INTERNAL MEDICINE | Facility: CLINIC | Age: 54
End: 2019-06-17
Payer: COMMERCIAL

## 2019-06-17 ENCOUNTER — NON-APPOINTMENT (OUTPATIENT)
Age: 54
End: 2019-06-17

## 2019-06-17 VITALS — BODY MASS INDEX: 24.43 KG/M2 | WEIGHT: 152 LBS | HEIGHT: 66 IN

## 2019-06-17 VITALS — SYSTOLIC BLOOD PRESSURE: 160 MMHG | DIASTOLIC BLOOD PRESSURE: 90 MMHG

## 2019-06-17 PROCEDURE — 99214 OFFICE O/P EST MOD 30 MIN: CPT | Mod: 25

## 2019-06-17 PROCEDURE — 93000 ELECTROCARDIOGRAM COMPLETE: CPT

## 2019-06-17 RX ORDER — ROSUVASTATIN CALCIUM 5 MG/1
5 TABLET, FILM COATED ORAL DAILY
Qty: 30 | Refills: 3 | Status: ACTIVE | COMMUNITY
Start: 2017-06-13

## 2019-06-17 NOTE — ASSESSMENT
[FreeTextEntry1] : Problems\par Hypertension\par Diabetes\par Hypercholesterolemia\par Mass of right arm\par Status post TIA\par Assessment\par The patient admits to me that because the father the weekend he was not adherent with his ethanol intake and sodium intake. His blood pressure today was 160/90 with a pulse of 90. His EKG did not reveal any acute changes. I increased his Micardis to 80 mg daily and advised him on a low sodium diet and advised him to refrain from ethanol\par Heart arm mass- an MRI without contrast was ordered\par Diabetes mellitus- I will repeat the A1c next week with his next visit

## 2019-06-17 NOTE — HISTORY OF PRESENT ILLNESS
[de-identified] : This is a 54-year-old he gentleman with a history of type 2 diabetes, hypertension, status post TIA who is here for followup on his blood pressure but in addition has noticed an expanding soft tissue mass on his right anterior arm. Denies any fever or chills

## 2019-06-17 NOTE — PHYSICAL EXAM
[Supple] : supple [No Lymphadenopathy] : no lymphadenopathy [No Respiratory Distress] : no respiratory distress  [No Accessory Muscle Use] : no accessory muscle use [Clear to Auscultation] : lungs were clear to auscultation bilaterally [No Carotid Bruits] : no carotid bruits [No Murmur] : no murmur heard [Regular Rhythm] : with a regular rhythm [No HSM] : no HSM [Soft] : abdomen soft [de-identified] : Soft tissue massanterior aspect of his right arm

## 2019-06-17 NOTE — REVIEW OF SYSTEMS
[Nasal Discharge] : no nasal discharge [Skin Rash] : skin rash [Itching] : itching [Negative] : Neurological

## 2019-06-23 ENCOUNTER — EMERGENCY (EMERGENCY)
Facility: HOSPITAL | Age: 54
LOS: 1 days | Discharge: HOME | End: 2019-06-23
Attending: EMERGENCY MEDICINE | Admitting: EMERGENCY MEDICINE
Payer: COMMERCIAL

## 2019-06-23 VITALS
DIASTOLIC BLOOD PRESSURE: 91 MMHG | HEART RATE: 108 BPM | OXYGEN SATURATION: 100 % | TEMPERATURE: 98 F | SYSTOLIC BLOOD PRESSURE: 160 MMHG | RESPIRATION RATE: 17 BRPM

## 2019-06-23 DIAGNOSIS — K60.3 ANAL FISTULA: Chronic | ICD-10-CM

## 2019-06-23 DIAGNOSIS — S96.912S STRAIN OF UNSPECIFIED MUSCLE AND TENDON AT ANKLE AND FOOT LEVEL, LEFT FOOT, SEQUELA: Chronic | ICD-10-CM

## 2019-06-23 LAB
ALBUMIN SERPL ELPH-MCNC: 4.1 G/DL — SIGNIFICANT CHANGE UP (ref 3.3–5)
ALP SERPL-CCNC: 63 U/L — SIGNIFICANT CHANGE UP (ref 40–120)
ALT FLD-CCNC: 24 U/L — SIGNIFICANT CHANGE UP (ref 4–41)
ANION GAP SERPL CALC-SCNC: 12 MMO/L — SIGNIFICANT CHANGE UP (ref 7–14)
AST SERPL-CCNC: 12 U/L — SIGNIFICANT CHANGE UP (ref 4–40)
BASOPHILS # BLD AUTO: 0.04 K/UL — SIGNIFICANT CHANGE UP (ref 0–0.2)
BASOPHILS NFR BLD AUTO: 0.4 % — SIGNIFICANT CHANGE UP (ref 0–2)
BILIRUB SERPL-MCNC: 0.4 MG/DL — SIGNIFICANT CHANGE UP (ref 0.2–1.2)
BUN SERPL-MCNC: 15 MG/DL — SIGNIFICANT CHANGE UP (ref 7–23)
CALCIUM SERPL-MCNC: 9.5 MG/DL — SIGNIFICANT CHANGE UP (ref 8.4–10.5)
CHLORIDE SERPL-SCNC: 102 MMOL/L — SIGNIFICANT CHANGE UP (ref 98–107)
CO2 SERPL-SCNC: 24 MMOL/L — SIGNIFICANT CHANGE UP (ref 22–31)
CREAT SERPL-MCNC: 0.82 MG/DL — SIGNIFICANT CHANGE UP (ref 0.5–1.3)
EOSINOPHIL # BLD AUTO: 0.07 K/UL — SIGNIFICANT CHANGE UP (ref 0–0.5)
EOSINOPHIL NFR BLD AUTO: 0.7 % — SIGNIFICANT CHANGE UP (ref 0–6)
GLUCOSE SERPL-MCNC: 238 MG/DL — HIGH (ref 70–99)
HCT VFR BLD CALC: 43.3 % — SIGNIFICANT CHANGE UP (ref 39–50)
HGB BLD-MCNC: 14.4 G/DL — SIGNIFICANT CHANGE UP (ref 13–17)
IMM GRANULOCYTES NFR BLD AUTO: 0.5 % — SIGNIFICANT CHANGE UP (ref 0–1.5)
LYMPHOCYTES # BLD AUTO: 1.78 K/UL — SIGNIFICANT CHANGE UP (ref 1–3.3)
LYMPHOCYTES # BLD AUTO: 16.9 % — SIGNIFICANT CHANGE UP (ref 13–44)
MCHC RBC-ENTMCNC: 27.9 PG — SIGNIFICANT CHANGE UP (ref 27–34)
MCHC RBC-ENTMCNC: 33.3 % — SIGNIFICANT CHANGE UP (ref 32–36)
MCV RBC AUTO: 83.8 FL — SIGNIFICANT CHANGE UP (ref 80–100)
MONOCYTES # BLD AUTO: 0.77 K/UL — SIGNIFICANT CHANGE UP (ref 0–0.9)
MONOCYTES NFR BLD AUTO: 7.3 % — SIGNIFICANT CHANGE UP (ref 2–14)
NEUTROPHILS # BLD AUTO: 7.85 K/UL — HIGH (ref 1.8–7.4)
NEUTROPHILS NFR BLD AUTO: 74.2 % — SIGNIFICANT CHANGE UP (ref 43–77)
NRBC # FLD: 0 K/UL — SIGNIFICANT CHANGE UP (ref 0–0)
PLATELET # BLD AUTO: 266 K/UL — SIGNIFICANT CHANGE UP (ref 150–400)
PMV BLD: 8.8 FL — SIGNIFICANT CHANGE UP (ref 7–13)
POTASSIUM SERPL-MCNC: 4.2 MMOL/L — SIGNIFICANT CHANGE UP (ref 3.5–5.3)
POTASSIUM SERPL-SCNC: 4.2 MMOL/L — SIGNIFICANT CHANGE UP (ref 3.5–5.3)
PROT SERPL-MCNC: 7.2 G/DL — SIGNIFICANT CHANGE UP (ref 6–8.3)
RBC # BLD: 5.17 M/UL — SIGNIFICANT CHANGE UP (ref 4.2–5.8)
RBC # FLD: 13.2 % — SIGNIFICANT CHANGE UP (ref 10.3–14.5)
SODIUM SERPL-SCNC: 138 MMOL/L — SIGNIFICANT CHANGE UP (ref 135–145)
URATE SERPL-MCNC: 5.8 MG/DL — SIGNIFICANT CHANGE UP (ref 3.4–8.8)
WBC # BLD: 10.56 K/UL — HIGH (ref 3.8–10.5)
WBC # FLD AUTO: 10.56 K/UL — HIGH (ref 3.8–10.5)

## 2019-06-23 PROCEDURE — 73562 X-RAY EXAM OF KNEE 3: CPT | Mod: 26,RT

## 2019-06-23 PROCEDURE — 99283 EMERGENCY DEPT VISIT LOW MDM: CPT

## 2019-06-23 RX ORDER — IBUPROFEN 200 MG
600 TABLET ORAL ONCE
Refills: 0 | Status: COMPLETED | OUTPATIENT
Start: 2019-06-23 | End: 2019-06-23

## 2019-06-23 RX ORDER — COLCHICINE 0.6 MG
1 TABLET ORAL
Qty: 30 | Refills: 0
Start: 2019-06-23 | End: 2019-07-07

## 2019-06-23 RX ADMIN — Medication 600 MILLIGRAM(S): at 15:30

## 2019-06-23 NOTE — ED PROVIDER NOTE - NSFOLLOWUPINSTRUCTIONS_ED_ALL_ED_FT
Follow up with your PMD within 48-72 hrs.   Follow up with an Orthopedist within the week. Show copies of your reports given to you.   Take Colchicine 0.6mg 1 tab 2x/day for 1 week.   Take Tylenol 650mg every 4 hrs as needed for pain.   Take all of your other medications as previously prescribed.   Worsening, continued or ANY new concerning symptoms return to the emergency department.

## 2019-06-23 NOTE — ED PROVIDER NOTE - NSFOLLOWUPCLINICS_GEN_ALL_ED_FT
[facility]  Orthopedic Surgery  [address] [address]  Phone: [phone]  Fax:   Follow Up Time:     [facility]  Orthopedic Surgery  [address] [place] [address]  Phone: [phone]  Fax:   Follow Up Time:

## 2019-06-23 NOTE — ED ADULT TRIAGE NOTE - CHIEF COMPLAINT QUOTE
Pt c/o rt knee pain, atraumatic, radiating to rt groin, also c/o fevers/chills since friday. Pt recently started taking bp medication, hx of dm.

## 2019-06-23 NOTE — ED PROVIDER NOTE - PROGRESS NOTE DETAILS
PA Tiberio- labs stable. xray no acute findings. Likely gout. Will DC home on colchicine and have follow up with Ortho within the week. PA Tiberio- labs stable. xray no acute findings. Will DC home on colchicine and have follow up with Ortho within the week.

## 2019-06-23 NOTE — ED PROVIDER NOTE - CLINICAL SUMMARY MEDICAL DECISION MAKING FREE TEXT BOX
53 y/o M with h/o DM, HTN, Gout pw sudden onset atraumatic right knee pain with FROM presents likely gout- will draw labs with uric acid level, obtain xray right knee, NSAIDS, and give Ortho follow up with strict return precautions 55 y/o M with h/o DM, HTN, Gout(?) pw sudden onset atraumatic right knee pain.  Unclear etiology.  Possible soft tissue injury vs gout, though has not had dx confirmed.  Exam not c/w septic joint. Would consider dx tap of knee, but pt would rather defer.  Provide NSAID and reassess. Will f/u c ortho.

## 2019-06-23 NOTE — ED PROVIDER NOTE - OBJECTIVE STATEMENT
55 y/o M h/o DM, HTN, Gout pw atraumatic right knee pain and swelling x 2 days accompanied with subjective fever upon onset now resolved, but chills continued. Denies redness to the area, weakness, numbness, tingling, known injury. Pain worse with movement.  H/o gout to the left ankle a few years ago. Took Motrin this 8am with relief. 53 y/o M h/o DM, HTN, possible gout, pw atraumatic right knee pain and swelling x 2 days accompanied with subjective fever upon onset now resolved, but chills continued. Denies redness to the area, weakness, numbness, tingling, known injury. Pain worse with movement but is able ot range his knee.  States he was told he had gout to the left ankle a few years ago, but has never had a joint tapped and so this is only a presumed dx.. Took Motrin this 8am with relief.

## 2019-06-23 NOTE — ED PROVIDER NOTE - ATTENDING CONTRIBUTION TO CARE
Dr. Hoff: ED Attending (Dr Hoff): I have personally performed a face to face bedside history and physical examination of this patient.  I have discussed the case with the PA and  I have personally authored the following components: HPI, ROS, Physical Exam and MDM in addition to reviewing and revising the rest of the Provider Note. 55 y/o M with h/o DM, HTN, Gout(?) pw sudden onset atraumatic right knee pain.  Unclear etiology.  Possible soft tissue injury vs gout, though has not had dx confirmed.  Exam not c/w septic joint. Would consider dx tap of knee, but pt would rather defer.  Provide NSAID and reassess. Will f/u c ortho.

## 2019-07-02 ENCOUNTER — MEDICATION RENEWAL (OUTPATIENT)
Age: 54
End: 2019-07-02

## 2019-07-04 ENCOUNTER — FORM ENCOUNTER (OUTPATIENT)
Age: 54
End: 2019-07-04

## 2019-07-05 ENCOUNTER — OUTPATIENT (OUTPATIENT)
Dept: OUTPATIENT SERVICES | Facility: HOSPITAL | Age: 54
LOS: 1 days | End: 2019-07-05
Payer: COMMERCIAL

## 2019-07-05 ENCOUNTER — APPOINTMENT (OUTPATIENT)
Dept: ULTRASOUND IMAGING | Facility: CLINIC | Age: 54
End: 2019-07-05
Payer: COMMERCIAL

## 2019-07-05 DIAGNOSIS — S96.912S STRAIN OF UNSPECIFIED MUSCLE AND TENDON AT ANKLE AND FOOT LEVEL, LEFT FOOT, SEQUELA: Chronic | ICD-10-CM

## 2019-07-05 DIAGNOSIS — K60.3 ANAL FISTULA: Chronic | ICD-10-CM

## 2019-07-05 DIAGNOSIS — R22.30 LOCALIZED SWELLING, MASS AND LUMP, UNSPECIFIED UPPER LIMB: ICD-10-CM

## 2019-07-05 PROCEDURE — 76882 US LMTD JT/FCL EVL NVASC XTR: CPT | Mod: 26,RT

## 2019-07-05 PROCEDURE — 76882 US LMTD JT/FCL EVL NVASC XTR: CPT

## 2019-07-08 ENCOUNTER — APPOINTMENT (OUTPATIENT)
Dept: INTERNAL MEDICINE | Facility: CLINIC | Age: 54
End: 2019-07-08
Payer: COMMERCIAL

## 2019-07-08 VITALS — SYSTOLIC BLOOD PRESSURE: 160 MMHG | DIASTOLIC BLOOD PRESSURE: 95 MMHG

## 2019-07-08 PROCEDURE — 99214 OFFICE O/P EST MOD 30 MIN: CPT

## 2019-07-08 RX ORDER — TELMISARTAN 80 MG/1
80 TABLET ORAL
Qty: 90 | Refills: 3 | Status: DISCONTINUED | COMMUNITY
Start: 2019-05-06 | End: 2019-07-08

## 2019-07-08 NOTE — PHYSICAL EXAM
[No Lymphadenopathy] : no lymphadenopathy [Supple] : supple [No Accessory Muscle Use] : no accessory muscle use [Clear to Auscultation] : lungs were clear to auscultation bilaterally [Normal Percussion] : the chest was normal to percussion [Regular Rhythm] : with a regular rhythm [Normal S1, S2] : normal S1 and S2 [No Murmur] : no murmur heard [de-identified] : Swelling of rm

## 2019-07-08 NOTE — REVIEW OF SYSTEMS
[Nasal Discharge] : no nasal discharge [Cough] : cough [Itching] : itching [Skin Rash] : skin rash [Negative] : Neurological

## 2019-07-08 NOTE — ASSESSMENT
[FreeTextEntry1] : Visit patient with a history of hypertension, diabetes, hypercholesterolemia who has developed his status persistent cough since starting Micardis. In addition ultrasound of the forearm did not reveal a mass but did reveal soft tissue swelling. I discontinued his Micardis and started him on metoprolol 25 mg b.i.d. In addition I referred him to orthopedics for evaluation with instructions to return in one week

## 2019-07-08 NOTE — HISTORY OF PRESENT ILLNESS
[de-identified] : This is a 54-year-old gentleman with a history of hypertension, diabetes, hypercholesterolemia, recent swelling of his right forearm.\par The patient claims that since my card as well as increase his coughing has gotten worse. In addition he did have his ultrasound of the arm

## 2019-07-25 ENCOUNTER — APPOINTMENT (OUTPATIENT)
Dept: INTERNAL MEDICINE | Facility: CLINIC | Age: 54
End: 2019-07-25
Payer: COMMERCIAL

## 2019-07-25 ENCOUNTER — RX RENEWAL (OUTPATIENT)
Age: 54
End: 2019-07-25

## 2019-07-25 VITALS — HEIGHT: 66 IN | BODY MASS INDEX: 24.91 KG/M2 | WEIGHT: 155 LBS

## 2019-07-25 VITALS — SYSTOLIC BLOOD PRESSURE: 130 MMHG | DIASTOLIC BLOOD PRESSURE: 80 MMHG

## 2019-07-25 DIAGNOSIS — R22.30 LOCALIZED SWELLING, MASS AND LUMP, UNSPECIFIED UPPER LIMB: ICD-10-CM

## 2019-07-25 DIAGNOSIS — R05 COUGH: ICD-10-CM

## 2019-07-25 PROCEDURE — 99214 OFFICE O/P EST MOD 30 MIN: CPT

## 2019-07-25 RX ORDER — BENZONATATE 100 MG/1
100 CAPSULE ORAL 3 TIMES DAILY
Qty: 30 | Refills: 1 | Status: ACTIVE | COMMUNITY
Start: 2019-07-25 | End: 1900-01-01

## 2019-07-25 NOTE — PHYSICAL EXAM
[Normal] : normal rate, regular rhythm, normal S1 and S2 and no murmur heard [de-identified] : Large lipoma of his right for forearm

## 2019-07-25 NOTE — ASSESSMENT
[FreeTextEntry1] : Problems\par Hypertension\par Persistent cough\par Diabetes mellitus \par Lipoma of his arm\par TIA\par Assessment\par The patient's main issue today is control of his blood pressure and his persistent cough and the lipomas his right arm. His blood pressure is well-controlled at 130/80. I stopped the patient's angiotensin receptor blocker and placed him on metoprolol 25 mg b.i.d. I referred him to orthopedics for his arm lipoma. In addition I ordered a chest x-ray and he is also due for his colonoscopy I advised him to proceed with that and he will be seeing orthopedics for his arm\par \par \par

## 2019-07-25 NOTE — HISTORY OF PRESENT ILLNESS
[de-identified] : This is a 54-year-old gentleman with a history of diabetes mellitus, TIA, hypertension, large lipoma of his right arm who is here for a followup visit. The patient's main complaint is discomfort from his right arm.

## 2019-08-15 ENCOUNTER — APPOINTMENT (OUTPATIENT)
Dept: INTERNAL MEDICINE | Facility: CLINIC | Age: 54
End: 2019-08-15

## 2019-08-26 ENCOUNTER — APPOINTMENT (OUTPATIENT)
Dept: ORTHOPEDIC SURGERY | Facility: CLINIC | Age: 54
End: 2019-08-26
Payer: COMMERCIAL

## 2019-08-26 VITALS — WEIGHT: 155 LBS | BODY MASS INDEX: 25.83 KG/M2 | HEIGHT: 65 IN

## 2019-08-26 DIAGNOSIS — M25.562 PAIN IN LEFT KNEE: ICD-10-CM

## 2019-08-26 DIAGNOSIS — G89.29 PAIN IN LEFT KNEE: ICD-10-CM

## 2019-08-26 PROCEDURE — 20610 DRAIN/INJ JOINT/BURSA W/O US: CPT | Mod: LT

## 2019-08-26 PROCEDURE — 73564 X-RAY EXAM KNEE 4 OR MORE: CPT | Mod: LT

## 2019-08-26 PROCEDURE — 99204 OFFICE O/P NEW MOD 45 MIN: CPT | Mod: 25

## 2019-08-26 NOTE — HISTORY OF PRESENT ILLNESS
[de-identified] : This is very nice 54-year-old gentleman experiencing 1 week of left knee pain, which is severe in intensity.  On further questioning is actually had pain intermittently for the last several years but this acutely flared 1 week ago when he felt a clicking in the medial aspect of his left knee.  This occurred atraumatically.  Since then his knee is not giving way and it is not again clicked or caught or locked.  The pain substantially limits activities of daily living. Walking tolerance is reduced.  He is tried over-the-counter NSAIDs which have not helped to improve the pain.  He has tried intra-articular hyaluronic acid injection several years ago which helped.  He does not use a knee brace.  He does not use a cane or walker.  He has not tried physical therapy.  The patient denies any radiation of the pain to the feet and it is not associated with numbness, tingling, or weakness.

## 2019-08-26 NOTE — PHYSICAL EXAM
[de-identified] : Patient is well nourished, well-developed, in no acute distress, with appropriate mood and affect. The patient is oriented to time, place, and person. Respirations are even and unlabored. Gait evaluation does not reveal a limp. There is no inguinal adenopathy. Examination of the contralateral knee shows normal range of motion, strength, no tenderness, and intact skin. The affected limb is well-perfused and showed 2+ dp/pt pulses, without skin lesions, shows a grossly normal motor and sensory examination. Knee motion is painless and the knee moves from 0-130 degrees. The knee is stable within that range-of-motion to AP and ML stress with a 1A Lachman, negative anterior or posterior drawer and no instability to varus or valgus stress. The alignment of the knee is 5 degrees varus. No effusion or crepitus is noted.  Tender to palpation in the medial joint line.  No tenderness to palpation about the lateral joint line, medial or lateral tibial plateau, medial or lateral femoral condyle, medial or lateral patellar facets, superior or inferior pole of the patella. Arcenio's is positive medially. Muscle strength is normal. Pedal pulses are palpable. Hip examination was negative. [de-identified] : Long standing knee, AP knee, lateral knee, and patellar views of the left knee were ordered and taken in the office and demonstrate no evidence of degenerative joint disease of the knee, fractures, intra-articular pathology.

## 2019-08-26 NOTE — REVIEW OF SYSTEMS
[Arthralgia] : no arthralgia [Joint Pain] : joint pain [Joint Stiffness] : joint stiffness [Joint Swelling] : joint swelling [Negative] : Heme/Lymph

## 2019-08-26 NOTE — DISCUSSION/SUMMARY
[de-identified] : This patient has acute onset of left knee pain possibly secondary to meniscal tear. An extensive discussion was conducted on the natural history of the disease and the variety of surgical and non-surgical options available to the patient including, but not limited to non-steroidal anti-inflammatory medications, steroid injections, physical therapy, maintenance of ideal body weight, and reduction of activity.  Today we performed a left knee intra-articular cortisone injection.  He prefers to trial over-the-counter NSAIDs as opposed to prescription NSAIDs.  He will prefers to try a home exercise program instead of a physical therapy program.  A neoprene sleeve knee brace is recommended.  The patient will schedule an appointment as needed.\par \par Informed consent for the left knee injection was obtained. All questions were answered. A time out was performed. The left knee was prepped and draped in sterile fashion. Using sterile technique, the left knee was injection of 2cc of Kenalog, 4cc of 1% lidocaine, 2cc of 0.5% marcaine using a 21-gauge needle. A sterile dressing was applied. Post injection instructions were reviewed. The patient tolerated the procedure well.\par

## 2019-09-04 ENCOUNTER — MEDICATION RENEWAL (OUTPATIENT)
Age: 54
End: 2019-09-04

## 2019-09-04 ENCOUNTER — TRANSCRIPTION ENCOUNTER (OUTPATIENT)
Age: 54
End: 2019-09-04

## 2019-10-10 ENCOUNTER — MEDICATION RENEWAL (OUTPATIENT)
Age: 54
End: 2019-10-10

## 2019-10-10 RX ORDER — OLOPATADINE HCL 1 MG/ML
0.1 SOLUTION/ DROPS OPHTHALMIC TWICE DAILY
Qty: 1 | Refills: 3 | Status: ACTIVE | COMMUNITY
Start: 2019-06-17 | End: 1900-01-01

## 2019-11-22 ENCOUNTER — LABORATORY RESULT (OUTPATIENT)
Age: 54
End: 2019-11-22

## 2019-11-22 ENCOUNTER — NON-APPOINTMENT (OUTPATIENT)
Age: 54
End: 2019-11-22

## 2019-11-22 ENCOUNTER — APPOINTMENT (OUTPATIENT)
Dept: INTERNAL MEDICINE | Facility: CLINIC | Age: 54
End: 2019-11-22
Payer: COMMERCIAL

## 2019-11-22 ENCOUNTER — MEDICATION RENEWAL (OUTPATIENT)
Age: 54
End: 2019-11-22

## 2019-11-22 VITALS — WEIGHT: 147 LBS | HEIGHT: 65 IN | BODY MASS INDEX: 24.49 KG/M2

## 2019-11-22 DIAGNOSIS — G45.9 TRANSIENT CEREBRAL ISCHEMIC ATTACK, UNSPECIFIED: ICD-10-CM

## 2019-11-22 DIAGNOSIS — Z00.00 ENCOUNTER FOR GENERAL ADULT MEDICAL EXAMINATION W/OUT ABNORMAL FINDINGS: ICD-10-CM

## 2019-11-22 LAB
ALBUMIN SERPL ELPH-MCNC: 4.6 G/DL
ALP BLD-CCNC: 57 U/L
ALT SERPL-CCNC: 21 U/L
ANION GAP SERPL CALC-SCNC: 14 MMOL/L
AST SERPL-CCNC: 13 U/L
BASOPHILS # BLD AUTO: 0.04 K/UL
BASOPHILS NFR BLD AUTO: 0.6 %
BILIRUB SERPL-MCNC: 0.5 MG/DL
BUN SERPL-MCNC: 12 MG/DL
CALCIUM SERPL-MCNC: 9.7 MG/DL
CHLORIDE SERPL-SCNC: 100 MMOL/L
CHOLEST SERPL-MCNC: 201 MG/DL
CHOLEST/HDLC SERPL: 4.1 RATIO
CO2 SERPL-SCNC: 25 MMOL/L
CREAT SERPL-MCNC: 0.8 MG/DL
EOSINOPHIL # BLD AUTO: 0.07 K/UL
EOSINOPHIL NFR BLD AUTO: 1 %
HCT VFR BLD CALC: 49.3 %
HDLC SERPL-MCNC: 49 MG/DL
HGB BLD-MCNC: 15.6 G/DL
IMM GRANULOCYTES NFR BLD AUTO: 0.6 %
LDLC SERPL CALC-MCNC: 119 MG/DL
LYMPHOCYTES # BLD AUTO: 2.31 K/UL
LYMPHOCYTES NFR BLD AUTO: 32 %
MAN DIFF?: NORMAL
MCHC RBC-ENTMCNC: 27.5 PG
MCHC RBC-ENTMCNC: 31.6 GM/DL
MCV RBC AUTO: 86.9 FL
MONOCYTES # BLD AUTO: 0.49 K/UL
MONOCYTES NFR BLD AUTO: 6.8 %
NEUTROPHILS # BLD AUTO: 4.28 K/UL
NEUTROPHILS NFR BLD AUTO: 59 %
PLATELET # BLD AUTO: 314 K/UL
POTASSIUM SERPL-SCNC: 4.5 MMOL/L
PROT SERPL-MCNC: 7.1 G/DL
RBC # BLD: 5.67 M/UL
RBC # FLD: 13.2 %
SAVE SPECIMEN: NORMAL
SODIUM SERPL-SCNC: 139 MMOL/L
TRIGL SERPL-MCNC: 164 MG/DL
URATE SERPL-MCNC: 6 MG/DL
WBC # FLD AUTO: 7.23 K/UL

## 2019-11-22 PROCEDURE — 99396 PREV VISIT EST AGE 40-64: CPT | Mod: 25

## 2019-11-22 PROCEDURE — 36415 COLL VENOUS BLD VENIPUNCTURE: CPT

## 2019-11-22 PROCEDURE — 93000 ELECTROCARDIOGRAM COMPLETE: CPT

## 2019-11-22 RX ORDER — ROSUVASTATIN CALCIUM 5 MG/1
5 TABLET, FILM COATED ORAL
Qty: 90 | Refills: 3 | Status: ACTIVE | COMMUNITY
Start: 2017-06-13

## 2019-11-22 RX ORDER — BLOOD-GLUCOSE METER
W/DEVICE KIT MISCELLANEOUS
Qty: 1 | Refills: 0 | Status: ACTIVE | COMMUNITY
Start: 2017-10-02 | End: 1900-01-01

## 2019-11-22 RX ORDER — PROMETHAZINE HYDROCHLORIDE 6.25 MG/5ML
6.25 SOLUTION ORAL
Qty: 240 | Refills: 2 | Status: DISCONTINUED | COMMUNITY
Start: 2019-07-25 | End: 2019-11-22

## 2019-11-22 NOTE — REVIEW OF SYSTEMS
[Nasal Discharge] : no nasal discharge [Cough] : no cough [Itching] : itching [Skin Rash] : skin rash [Negative] : Neurological

## 2019-11-22 NOTE — ASSESSMENT
[FreeTextEntry1] : The patient's vital signs were stable. His physical examination is normal except for significant bilateral Dupuytren's contractures. Blood were obtained. I referred him to a hand specialist for reevaluation. He's also due for his colonoscopy

## 2019-11-22 NOTE — HEALTH RISK ASSESSMENT
[] : No [Never (0 pts)] : Never (0 points) [No] : In the past 12 months have you used drugs other than those required for medical reasons? No [0] : 2) Feeling down, depressed, or hopeless: Not at all (0) [[id number]] : 0 [Fully functional (bathing, dressing, toileting, transferring, walking, feeding)] : Fully functional (bathing, dressing, toileting, transferring, walking, feeding) [Fully functional (using the telephone, shopping, preparing meals, housekeeping, doing laundry, using] : Fully functional and needs no help or supervision to perform IADLs (using the telephone, shopping, preparing meals, housekeeping, doing laundry, using transportation, managing medications and managing finances) [With Patient/Caregiver] : With Patient/Caregiver [I will adhere to the patient's wishes as expressed in the advance directive except as noted below.] : I will adhere to the patient's wishes as expressed in the advance directive except as noted below [AdvancecareDate] : 11/22/19

## 2019-11-22 NOTE — PHYSICAL EXAM
[No Mass] : no mass [Testes Tenderness] : no tenderness of the testes [Testes Mass (___cm)] : there were no testicular masses [No Prostate Nodules] : no prostate nodules [Normal] : affect was normal and insight and judgment were intact [de-identified] : Bilateral Dupuytren's contractures

## 2019-11-22 NOTE — HISTORY OF PRESENT ILLNESS
[de-identified] : This is a 54-year-old gentleman with a history of type 2 diabetes mellitus, hypertension, hypercholesterolemia, status post TIA who is here today for his annual examination.

## 2019-11-23 LAB
25(OH)D3 SERPL-MCNC: 22.4 NG/ML
APPEARANCE: CLEAR
BACTERIA: NEGATIVE
BILIRUBIN URINE: NEGATIVE
BLOOD URINE: NEGATIVE
COLOR: YELLOW
ESTIMATED AVERAGE GLUCOSE: 272 MG/DL
FERRITIN SERPL-MCNC: 74 NG/ML
FOLATE SERPL-MCNC: >20 NG/ML
GLUCOSE QUALITATIVE U: NEGATIVE
GLUCOSE SERPL-MCNC: 138 MG/DL
HBA1C MFR BLD HPLC: 11.1 %
HYALINE CASTS: 1 /LPF
KETONES URINE: NEGATIVE
LEUKOCYTE ESTERASE URINE: NEGATIVE
MICROSCOPIC-UA: NORMAL
NITRITE URINE: NEGATIVE
PH URINE: 6
PROTEIN URINE: NORMAL
RED BLOOD CELLS URINE: 4 /HPF
SPECIFIC GRAVITY URINE: 1.03
SQUAMOUS EPITHELIAL CELLS: 1 /HPF
T3RU NFR SERPL: 0.9 TBI
T4 SERPL-MCNC: 5.8 UG/DL
TSH SERPL-ACNC: 1.19 UIU/ML
UROBILINOGEN URINE: NORMAL
VIT B12 SERPL-MCNC: 571 PG/ML
WHITE BLOOD CELLS URINE: 1 /HPF

## 2019-12-24 ENCOUNTER — OUTPATIENT (OUTPATIENT)
Dept: OUTPATIENT SERVICES | Facility: HOSPITAL | Age: 54
LOS: 1 days | End: 2019-12-24
Payer: COMMERCIAL

## 2019-12-24 ENCOUNTER — APPOINTMENT (OUTPATIENT)
Dept: MRI IMAGING | Facility: CLINIC | Age: 54
End: 2019-12-24
Payer: COMMERCIAL

## 2019-12-24 DIAGNOSIS — S96.912S STRAIN OF UNSPECIFIED MUSCLE AND TENDON AT ANKLE AND FOOT LEVEL, LEFT FOOT, SEQUELA: Chronic | ICD-10-CM

## 2019-12-24 DIAGNOSIS — Z00.8 ENCOUNTER FOR OTHER GENERAL EXAMINATION: ICD-10-CM

## 2019-12-24 DIAGNOSIS — K60.3 ANAL FISTULA: Chronic | ICD-10-CM

## 2019-12-24 PROCEDURE — 70551 MRI BRAIN STEM W/O DYE: CPT

## 2019-12-24 PROCEDURE — 70551 MRI BRAIN STEM W/O DYE: CPT | Mod: 26

## 2019-12-25 ENCOUNTER — RESULT REVIEW (OUTPATIENT)
Age: 54
End: 2019-12-25

## 2020-02-18 ENCOUNTER — APPOINTMENT (OUTPATIENT)
Dept: INTERNAL MEDICINE | Facility: CLINIC | Age: 55
End: 2020-02-18

## 2020-04-14 RX ORDER — ZOLPIDEM TARTRATE 10 MG/1
10 TABLET ORAL
Qty: 30 | Refills: 3 | Status: ACTIVE | COMMUNITY
Start: 2018-09-27 | End: 1900-01-01

## 2020-04-14 RX ORDER — FLUTICASONE PROPIONATE 50 UG/1
50 SPRAY, METERED NASAL TWICE DAILY
Qty: 3 | Refills: 3 | Status: ACTIVE | COMMUNITY
Start: 2019-05-06 | End: 1900-01-01

## 2020-04-15 ENCOUNTER — RX RENEWAL (OUTPATIENT)
Age: 55
End: 2020-04-15

## 2020-04-20 ENCOUNTER — APPOINTMENT (OUTPATIENT)
Dept: INTERNAL MEDICINE | Facility: CLINIC | Age: 55
End: 2020-04-20
Payer: COMMERCIAL

## 2020-04-20 DIAGNOSIS — E01.0 IODINE-DEFICIENCY RELATED DIFFUSE (ENDEMIC) GOITER: ICD-10-CM

## 2020-04-20 PROCEDURE — 99214 OFFICE O/P EST MOD 30 MIN: CPT | Mod: 95

## 2020-04-20 RX ORDER — DICLOFENAC SODIUM 10 MG/G
1 GEL TOPICAL
Qty: 100 | Refills: 0 | Status: DISCONTINUED | COMMUNITY
Start: 2020-01-14

## 2020-04-20 RX ORDER — AZITHROMYCIN 250 MG/1
250 TABLET, FILM COATED ORAL
Qty: 6 | Refills: 0 | Status: DISCONTINUED | COMMUNITY
Start: 2020-04-12

## 2020-04-20 RX ORDER — AZITHROMYCIN 1 G/1
1 POWDER, FOR SUSPENSION ORAL
Qty: 1 | Refills: 0 | Status: DISCONTINUED | COMMUNITY
Start: 2019-11-11

## 2020-04-20 RX ORDER — CLOTRIMAZOLE 10 MG/G
1 CREAM TOPICAL
Qty: 45 | Refills: 0 | Status: DISCONTINUED | COMMUNITY
Start: 2019-11-11

## 2020-04-20 RX ORDER — CIPROFLOXACIN HYDROCHLORIDE 500 MG/1
500 TABLET, FILM COATED ORAL
Qty: 10 | Refills: 0 | Status: DISCONTINUED | COMMUNITY
Start: 2019-11-04

## 2020-04-20 RX ORDER — BENZONATATE 100 MG/1
100 CAPSULE ORAL 3 TIMES DAILY
Qty: 30 | Refills: 1 | Status: ACTIVE | COMMUNITY
Start: 2020-04-20 | End: 1900-01-01

## 2020-04-20 RX ORDER — ALBUTEROL SULFATE 90 UG/1
108 (90 BASE) INHALANT RESPIRATORY (INHALATION)
Qty: 8 | Refills: 0 | Status: DISCONTINUED | COMMUNITY
Start: 2020-04-12

## 2020-04-20 RX ORDER — HYDROXYCHLOROQUINE SULFATE 200 MG/1
200 TABLET, FILM COATED ORAL
Qty: 14 | Refills: 0 | Status: DISCONTINUED | COMMUNITY
Start: 2020-04-12

## 2020-04-20 RX ORDER — NYSTATIN 100000 U/G
100000 OINTMENT TOPICAL
Qty: 30 | Refills: 0 | Status: DISCONTINUED | COMMUNITY
Start: 2019-11-11

## 2020-04-20 NOTE — ASSESSMENT
[FreeTextEntry1] : Problems\par Hypertension\par Corona virus infection\par Hypercholesterolemia\par Thyromegaly\par Diabetes mellitus\par Assessment\par The patient was advised to keep well-hydrated. Also advised him to take 81 mg aspirin daily because of the risk of pulmonary illness. I also prescribed Tessalon Perles t.i.d. p.r.n. for his cough. I advised him to call me if he develops any shortness of breath

## 2020-04-20 NOTE — REVIEW OF SYSTEMS
[Chills] : chills [Fever] : fever [Sore Throat] : sore throat [Cough] : cough [Diarrhea] : diarrhea [Negative] : Cardiovascular

## 2020-04-20 NOTE — HISTORY OF PRESENT ILLNESS
[Home] : at home, [unfilled] , at the time of the visit. [Patient] : the patient [Medical Office: ([facility])___] : at the medical office located in  [Self] : self [de-identified] : This is a 54-year-old patient with a history of cold of 19 infection characterized by a sore throat nonproductive cough and low-grade fever. The patient also has a history of diabetes mellitus type 2 hypercholesterolemia thyromegaly.

## 2020-05-07 ENCOUNTER — APPOINTMENT (OUTPATIENT)
Dept: INTERNAL MEDICINE | Facility: CLINIC | Age: 55
End: 2020-05-07
Payer: COMMERCIAL

## 2020-05-07 VITALS
BODY MASS INDEX: 24.16 KG/M2 | HEIGHT: 65 IN | WEIGHT: 145 LBS | TEMPERATURE: 99.8 F | DIASTOLIC BLOOD PRESSURE: 89 MMHG | SYSTOLIC BLOOD PRESSURE: 134 MMHG

## 2020-05-07 DIAGNOSIS — R05 COUGH: ICD-10-CM

## 2020-05-07 DIAGNOSIS — K29.70 GASTRITIS, UNSPECIFIED, W/OUT BLEEDING: ICD-10-CM

## 2020-05-07 DIAGNOSIS — U07.1 COVID-19: ICD-10-CM

## 2020-05-07 PROCEDURE — 99213 OFFICE O/P EST LOW 20 MIN: CPT | Mod: 95

## 2020-05-07 NOTE — ASSESSMENT
[FreeTextEntry1] : This is a patient who had a chronic virus infection 3 weeks ago and presently he was reexposed and is complaining of a lowered fever cough and myalgias. I advised the patient that he should stay home and not work and is affected he has symptoms drink a lot of fluids and take Tylenol

## 2020-05-07 NOTE — REVIEW OF SYSTEMS
[Fever] : fever [Chills] : chills [Diarrhea] : diarrhea [Sore Throat] : sore throat [Cough] : cough [Negative] : Eyes

## 2020-05-07 NOTE — HISTORY OF PRESENT ILLNESS
[Home] : at home, [unfilled] , at the time of the visit. [Medical Office: ([facility])___] : at the medical office located in  [de-identified] : This is a patient who was treated for chronic virus infection about 3 weeks ago who was reexposed yesterday and is complaining of chills low-grade fever and myalgias. He also is complaining of a nonproductive cough

## 2020-07-16 RX ORDER — METHYLPREDNISOLONE 4 MG/1
4 TABLET ORAL
Qty: 1 | Refills: 1 | Status: ACTIVE | COMMUNITY
Start: 2020-07-16 | End: 1900-01-01

## 2020-07-31 ENCOUNTER — APPOINTMENT (OUTPATIENT)
Dept: ORTHOPEDIC SURGERY | Facility: CLINIC | Age: 55
End: 2020-07-31
Payer: SELF-PAY

## 2020-07-31 VITALS — TEMPERATURE: 98 F

## 2020-07-31 VITALS — DIASTOLIC BLOOD PRESSURE: 83 MMHG | SYSTOLIC BLOOD PRESSURE: 134 MMHG | HEART RATE: 91 BPM

## 2020-07-31 DIAGNOSIS — M17.0 BILATERAL PRIMARY OSTEOARTHRITIS OF KNEE: ICD-10-CM

## 2020-07-31 DIAGNOSIS — M25.522 PAIN IN LEFT ELBOW: ICD-10-CM

## 2020-07-31 DIAGNOSIS — S46.212A STRAIN OF MUSCLE, FASCIA AND TENDON OF OTHER PARTS OF BICEPS, LEFT ARM, INITIAL ENCOUNTER: ICD-10-CM

## 2020-07-31 PROCEDURE — 99214 OFFICE O/P EST MOD 30 MIN: CPT

## 2020-07-31 PROCEDURE — 73562 X-RAY EXAM OF KNEE 3: CPT | Mod: 50

## 2020-07-31 PROCEDURE — 73080 X-RAY EXAM OF ELBOW: CPT | Mod: LT

## 2020-08-01 ENCOUNTER — EMERGENCY (EMERGENCY)
Facility: HOSPITAL | Age: 55
LOS: 1 days | Discharge: HOME | End: 2020-08-01
Attending: EMERGENCY MEDICINE | Admitting: EMERGENCY MEDICINE
Payer: SELF-PAY

## 2020-08-01 VITALS
SYSTOLIC BLOOD PRESSURE: 145 MMHG | DIASTOLIC BLOOD PRESSURE: 94 MMHG | HEART RATE: 100 BPM | OXYGEN SATURATION: 100 % | RESPIRATION RATE: 16 BRPM | TEMPERATURE: 98 F

## 2020-08-01 DIAGNOSIS — S96.912S STRAIN OF UNSPECIFIED MUSCLE AND TENDON AT ANKLE AND FOOT LEVEL, LEFT FOOT, SEQUELA: Chronic | ICD-10-CM

## 2020-08-01 DIAGNOSIS — K60.3 ANAL FISTULA: Chronic | ICD-10-CM

## 2020-08-01 PROCEDURE — 73080 X-RAY EXAM OF ELBOW: CPT | Mod: 26,LT

## 2020-08-01 PROCEDURE — 99283 EMERGENCY DEPT VISIT LOW MDM: CPT

## 2020-08-01 RX ORDER — LIDOCAINE 4 G/100G
1 CREAM TOPICAL ONCE
Refills: 0 | Status: COMPLETED | OUTPATIENT
Start: 2020-08-01 | End: 2020-08-01

## 2020-08-01 RX ORDER — IBUPROFEN 200 MG
800 TABLET ORAL ONCE
Refills: 0 | Status: COMPLETED | OUTPATIENT
Start: 2020-08-01 | End: 2020-08-01

## 2020-08-01 RX ORDER — ACETAMINOPHEN 500 MG
975 TABLET ORAL ONCE
Refills: 0 | Status: COMPLETED | OUTPATIENT
Start: 2020-08-01 | End: 2020-08-01

## 2020-08-01 RX ADMIN — Medication 975 MILLIGRAM(S): at 13:30

## 2020-08-01 RX ADMIN — LIDOCAINE 1 PATCH: 4 CREAM TOPICAL at 13:30

## 2020-08-01 RX ADMIN — Medication 800 MILLIGRAM(S): at 13:30

## 2020-08-01 NOTE — ED PROVIDER NOTE - NS ED ATTENDING STATEMENT MOD
Refill of rizatriptan  Leandra Tucker/   I have personally seen and examined this patient.  I have fully participated in the care of this patient. I have reviewed all pertinent clinical information, including history, physical exam, plan and the Resident’s note and agree except as noted.

## 2020-08-01 NOTE — CONSULT NOTE ADULT - SUBJECTIVE AND OBJECTIVE BOX
55y Male who presents w/ L elbow pain after feeling a pop in his biceps while playing cricket last week. Reports pain and difficulty moving affected extremity afterward. Denies headstrike/LOC. Denies numbness/tingling of the affected extremity. No other bone or joint complaints. Patient presented to his PCP who ordered him an MRI of L elbow.    PAST MEDICAL & SURGICAL HISTORY:  Anal fistula  Dyslipidemia  Hyperuricemia  Diabetes mellitus: type II  Tendon tear, ankle, left, sequela: 2016  Anal fistula: Ananl fistula repair 2013  Hx of appendectomy: 1987    MEDICATIONS  (STANDING):    MEDICATIONS  (PRN):    aspirin (Unknown)  Vasotec (Hives)      Physical Exam  T(C): 36.7 (08-01-20 @ 12:22), Max: 36.7 (08-01-20 @ 12:22)  HR: 100 (08-01-20 @ 12:22) (100 - 100)  BP: 145/94 (08-01-20 @ 12:22) (145/94 - 145/94)  RR: 16 (08-01-20 @ 12:22) (16 - 16)  SpO2: 100% (08-01-20 @ 12:22) (100% - 100%)  Wt(kg): --    Gen: NAD  LUE: skin intact, + echymosis over volar elbow  AIN/PIN/U intact  SILT M/U/R  2+ radial pulses, cap refill < 2s    Imaging  X-ray: no fracture or dislocation    A/P: 55y Male w/ likely partial biceps tendon rupture  - pain control'  - NWB in sling  - patient has orthopaedist whom he will follow up with  - patient has MRI ordered by his PCP for this week  - follow-up with Dr. Bhatia if patient needs follow up appointment

## 2020-08-01 NOTE — ED PROVIDER NOTE - PHYSICAL EXAMINATION
General appearance: NAD, conversant, afebrile   Neck: Trachea midline; Full range of motion, supple; Hypertonic trapezius, paraspinals on left   Pulm: CTA bl, normal respiratory effort and no intercostal retractions, normal work of breathing   CV: RRR, No murmurs, rubs, or gallops   Extremities: No peripheral edema, FROM x4 except small limited elbow extension, gross sensation intact b/l UE, 5/5 MS b/l UE, 2+ peripheral pulses   Skin: Dry, normal temperature, turgor and texture; no rash, ecchymosis over medial left elbow   Psych: Appropriate affect, cooperative General appearance: NAD, conversant, afebrile   Neck: Trachea midline; Full range of motion, supple; Hypertonic trapezius, paraspinals on left   Pulm: CTA bl, normal respiratory effort and no intercostal retractions, normal work of breathing   CV: RRR, No murmurs, rubs, or gallops   Extremities: Edema over left elbow joint, FROM x4 except small limited elbow extension, gross sensation intact b/l UE except over radial distribution to left hand, 5/5 MS b/l UE, 2+ peripheral pulses   Skin: Dry, normal temperature, turgor and texture; no rash, ecchymosis over medial left elbow   Psych: Appropriate affect, cooperative

## 2020-08-01 NOTE — ED PROVIDER NOTE - CLINICAL SUMMARY MEDICAL DECISION MAKING FREE TEXT BOX
56yo male with pmh dm presenting with left elbow pain, swelling, ecchymosis after trauma.  Subjective sensory deficits in left hand with worsening edema.  Concern for compartment syndrome, ortho consulted.  Will repeat xrays and give pain control.

## 2020-08-01 NOTE — ED PROVIDER NOTE - PATIENT PORTAL LINK FT
You can access the FollowMyHealth Patient Portal offered by [facility] by registering at the following website: [email or web address]. By joining [facility]’s FollowMyHealth portal, you will also be able to view your health information using other applications (apps) compatible with our system.

## 2020-08-01 NOTE — ED PROVIDER NOTE - OBJECTIVE STATEMENT
56yo male with pmh dm presenting with left arm pain.  Patient sustained injury sunday playing cricket match when he caught teammate while they were falling over with his left arm.  Felt minor pain at the time in the left elbow which worsened throughout the week.  He saw his pmd yesterday and got xrays which showed no fracture.  Plan was to get MRI to r/o biceps tendon tear but since yesterday he has had worsening pain and ecchymosis after getting examined.  Denies weakness, numbness. 54yo male with pmh dm presenting with left arm pain.  Patient sustained injury sunday playing cricket match when he caught teammate while they were falling over with his left arm.  Felt minor pain at the time in the left elbow which worsened throughout the week.  He saw his pmd yesterday and got xrays which showed no fracture.  Plan was to get MRI to r/o biceps tendon tear but since yesterday he has had worsening pain and ecchymosis after getting examined.  Denies weakness.  Admits to numbness over radial sensory distribution.

## 2020-08-01 NOTE — ED PROVIDER NOTE - ATTENDING CONTRIBUTION TO CARE
55 year old with left arm injury 1 week ago. xray reported wnl. presents with increased swelling. concern for tendon injury vs local swelling. will xray pain control ortho cx

## 2020-08-01 NOTE — ED ADULT NURSE REASSESSMENT NOTE - SYMPTOMS
pt c..o pain in lt upper arm after injury playing cricket last Sunday. + echymosis noted in lower upper arm + lt radial pulse palpable.

## 2020-08-01 NOTE — ED ADULT TRIAGE NOTE - CHIEF COMPLAINT QUOTE
pt was playing cricket last sun day and injured left arm,  seen by pmd yesterday ,x-rays done . pt c/o swelling and bruise /  sent for possible MRI

## 2020-08-01 NOTE — ED PROVIDER NOTE - NSFOLLOWUPINSTRUCTIONS_ED_ALL_ED_FT
Rest, drink plenty of fluids  Advance activity as tolerated  Continue all previously prescribed medications as directed  Follow up with your PMD - bring copies of your results  Obtain an MRI once able to be scheduled  Return to the ER for significant swelling, numbness or weakness of the arm, or other new or concerning symptoms   Take Tylenol 650-1000mg every six hours and supplement with ibuprofen 600-800mg, with food, every six hours which can be taken three hours apart from the Tylenol to have a layered effect.  If you would like or need orthopedic follow up after your MRI, you may make an appointment - contact information has been provided

## 2020-08-01 NOTE — ED PROVIDER NOTE - NSFOLLOWUPCLINICS_GEN_ALL_ED_FT
[facility] Orthopedic Surgery  Orthopedic Surgery  [address], [address] [place] [address]  Phone: [phone]  Fax:   Follow Up Time:

## 2020-08-01 NOTE — ED PROVIDER NOTE - CARDIOVASCULAR NEGATIVE STATEMENT, MLM
Patient positive on PCR, initiated on oral vancomycin & flagyl on 7/24  - has severe infection given rise in Cr, hemodynamic instability on presentation    Recommend  - 10-14 days PO vanc/flagyl  (start 7/24)    as in HPI

## 2020-08-05 ENCOUNTER — RESULT REVIEW (OUTPATIENT)
Age: 55
End: 2020-08-05

## 2020-08-05 ENCOUNTER — APPOINTMENT (OUTPATIENT)
Dept: MRI IMAGING | Facility: CLINIC | Age: 55
End: 2020-08-05
Payer: SELF-PAY

## 2020-08-05 ENCOUNTER — OUTPATIENT (OUTPATIENT)
Dept: OUTPATIENT SERVICES | Facility: HOSPITAL | Age: 55
LOS: 1 days | End: 2020-08-05
Payer: COMMERCIAL

## 2020-08-05 DIAGNOSIS — K60.3 ANAL FISTULA: Chronic | ICD-10-CM

## 2020-08-05 DIAGNOSIS — S96.912S STRAIN OF UNSPECIFIED MUSCLE AND TENDON AT ANKLE AND FOOT LEVEL, LEFT FOOT, SEQUELA: Chronic | ICD-10-CM

## 2020-08-05 DIAGNOSIS — M25.522 PAIN IN LEFT ELBOW: ICD-10-CM

## 2020-08-05 PROCEDURE — 73221 MRI JOINT UPR EXTREM W/O DYE: CPT | Mod: 26,LT

## 2020-08-05 PROCEDURE — 73221 MRI JOINT UPR EXTREM W/O DYE: CPT

## 2020-08-07 ENCOUNTER — OUTPATIENT (OUTPATIENT)
Dept: OUTPATIENT SERVICES | Facility: HOSPITAL | Age: 55
LOS: 1 days | End: 2020-08-07
Payer: SELF-PAY

## 2020-08-07 ENCOUNTER — APPOINTMENT (OUTPATIENT)
Dept: ORTHOPEDIC SURGERY | Facility: CLINIC | Age: 55
End: 2020-08-07
Payer: SELF-PAY

## 2020-08-07 VITALS
RESPIRATION RATE: 14 BRPM | TEMPERATURE: 98 F | SYSTOLIC BLOOD PRESSURE: 132 MMHG | HEIGHT: 66 IN | WEIGHT: 149.91 LBS | OXYGEN SATURATION: 98 % | HEART RATE: 85 BPM | DIASTOLIC BLOOD PRESSURE: 84 MMHG

## 2020-08-07 VITALS — TEMPERATURE: 98.2 F

## 2020-08-07 DIAGNOSIS — S46.219A STRAIN OF MUSCLE, FASCIA AND TENDON OF OTHER PARTS OF BICEPS, UNSPECIFIED ARM, INITIAL ENCOUNTER: ICD-10-CM

## 2020-08-07 DIAGNOSIS — S46.212A STRAIN OF MUSCLE, FASCIA AND TENDON OF OTHER PARTS OF BICEPS, LEFT ARM, INITIAL ENCOUNTER: ICD-10-CM

## 2020-08-07 DIAGNOSIS — K60.3 ANAL FISTULA: Chronic | ICD-10-CM

## 2020-08-07 DIAGNOSIS — S96.912S STRAIN OF UNSPECIFIED MUSCLE AND TENDON AT ANKLE AND FOOT LEVEL, LEFT FOOT, SEQUELA: Chronic | ICD-10-CM

## 2020-08-07 LAB
ANION GAP SERPL CALC-SCNC: 15 MMO/L — HIGH (ref 7–14)
BUN SERPL-MCNC: 15 MG/DL — SIGNIFICANT CHANGE UP (ref 7–23)
CALCIUM SERPL-MCNC: 8.9 MG/DL — SIGNIFICANT CHANGE UP (ref 8.4–10.5)
CHLORIDE SERPL-SCNC: 96 MMOL/L — LOW (ref 98–107)
CO2 SERPL-SCNC: 25 MMOL/L — SIGNIFICANT CHANGE UP (ref 22–31)
CREAT SERPL-MCNC: 0.78 MG/DL — SIGNIFICANT CHANGE UP (ref 0.5–1.3)
GLUCOSE SERPL-MCNC: 489 MG/DL — CRITICAL HIGH (ref 70–99)
HBA1C BLD-MCNC: 12.4 % — HIGH (ref 4–5.6)
HCT VFR BLD CALC: 45.4 % — SIGNIFICANT CHANGE UP (ref 39–50)
HGB BLD-MCNC: 14.9 G/DL — SIGNIFICANT CHANGE UP (ref 13–17)
MCHC RBC-ENTMCNC: 27.8 PG — SIGNIFICANT CHANGE UP (ref 27–34)
MCHC RBC-ENTMCNC: 32.8 % — SIGNIFICANT CHANGE UP (ref 32–36)
MCV RBC AUTO: 84.7 FL — SIGNIFICANT CHANGE UP (ref 80–100)
NRBC # FLD: 0 K/UL — SIGNIFICANT CHANGE UP (ref 0–0)
PLATELET # BLD AUTO: 263 K/UL — SIGNIFICANT CHANGE UP (ref 150–400)
PMV BLD: 9.7 FL — SIGNIFICANT CHANGE UP (ref 7–13)
POTASSIUM SERPL-MCNC: 4.8 MMOL/L — SIGNIFICANT CHANGE UP (ref 3.5–5.3)
POTASSIUM SERPL-SCNC: 4.8 MMOL/L — SIGNIFICANT CHANGE UP (ref 3.5–5.3)
RBC # BLD: 5.36 M/UL — SIGNIFICANT CHANGE UP (ref 4.2–5.8)
RBC # FLD: 12.1 % — SIGNIFICANT CHANGE UP (ref 10.3–14.5)
SODIUM SERPL-SCNC: 136 MMOL/L — SIGNIFICANT CHANGE UP (ref 135–145)
WBC # BLD: 9.14 K/UL — SIGNIFICANT CHANGE UP (ref 3.8–10.5)
WBC # FLD AUTO: 9.14 K/UL — SIGNIFICANT CHANGE UP (ref 3.8–10.5)

## 2020-08-07 PROCEDURE — 93010 ELECTROCARDIOGRAM REPORT: CPT

## 2020-08-07 PROCEDURE — 99213 OFFICE O/P EST LOW 20 MIN: CPT

## 2020-08-07 RX ORDER — METFORMIN HYDROCHLORIDE 850 MG/1
1 TABLET ORAL
Qty: 0 | Refills: 0 | DISCHARGE

## 2020-08-07 RX ORDER — GLIMEPIRIDE 1 MG
1 TABLET ORAL
Qty: 0 | Refills: 0 | DISCHARGE

## 2020-08-07 NOTE — H&P PST ADULT - HISTORY OF PRESENT ILLNESS
54y/o male scheduled for left distal biceps tendon reinsertion on 8/11/2020.  Pt states, "while play cricket tried to help player that tripped, extended left arm and felt a pop when catching other player.  F/u with ortho MRI sows bicep tendon tear." 56y/o male scheduled for left distal biceps tendon reinsertion on 8/11/2020.  Pt states, "while play cricket tried to help player that tripped, extended left arm and felt a pop when catching other player.  F/u with ortho MRI shows bicep tendon tear."

## 2020-08-07 NOTE — H&P PST ADULT - NEGATIVE GENERAL GENITOURINARY SYMPTOMS
no incontinence/no nocturia/no renal colic/no flank pain L/no flank pain R/no bladder infections/normal urinary frequency/no dysuria no bladder infections/no urinary hesitancy/no hematuria/no renal colic/no flank pain R/normal urinary frequency/no nocturia/no flank pain L/no dysuria

## 2020-08-07 NOTE — H&P PST ADULT - RS GEN PE MLT RESP DETAILS PC
breath sounds equal/respirations non-labored/clear to auscultation bilaterally respirations non-labored/clear to auscultation bilaterally/no rhonchi/no chest wall tenderness/no intercostal retractions/no rales/breath sounds equal/no wheezes

## 2020-08-07 NOTE — H&P PST ADULT - NEGATIVE GASTROINTESTINAL SYMPTOMS
no nausea/no abdominal pain/no vomiting/no constipation/no melena/no diarrhea no vomiting/no diarrhea/no nausea/no constipation/no abdominal pain

## 2020-08-07 NOTE — H&P PST ADULT - NEGATIVE GENERAL SYMPTOMS
no anorexia/no malaise/no weight loss/no polyphagia/no fatigue/no chills/no sweating/no fever/no weight gain/no polyuria/no polydipsia

## 2020-08-07 NOTE — H&P PST ADULT - NSICDXPASTSURGICALHX_GEN_ALL_CORE_FT
PAST SURGICAL HISTORY:  Anal fistula Ananl fistula repair 2013    Hx of appendectomy 1987    Tendon tear, ankle, left, sequela 2016

## 2020-08-07 NOTE — H&P PST ADULT - GASTROINTESTINAL DETAILS
nontender/soft soft/no masses palpable/no organomegaly/no guarding/no bruit/nontender/no rebound tenderness/no rigidity/no distention/bowel sounds normal

## 2020-08-07 NOTE — H&P PST ADULT - NSICDXFAMILYHX_GEN_ALL_CORE_FT
FAMILY HISTORY:  Mother  Still living? Unknown  Family history of diabetic complications, Age at diagnosis: Age Unknown  Family history of hypertension, Age at diagnosis: Age Unknown

## 2020-08-07 NOTE — H&P PST ADULT - NEGATIVE CARDIOVASCULAR SYMPTOMS
no chest pain/no claudication/no peripheral edema/no paroxysmal nocturnal dyspnea/no palpitations/no dyspnea on exertion/no orthopnea

## 2020-08-07 NOTE — H&P PST ADULT - NEUROLOGICAL
detailed exam negative Alert & oriented; no sensory, motor or coordination deficits, normal reflexes

## 2020-08-07 NOTE — H&P PST ADULT - NSANTHOSAYNRD_GEN_A_CORE
No. HYUN screening performed.  STOP BANG Legend: 0-2 = LOW Risk; 3-4 = INTERMEDIATE Risk; 5-8 = HIGH Risk

## 2020-08-07 NOTE — H&P PST ADULT - NSICDXPASTMEDICALHX_GEN_ALL_CORE_FT
PAST MEDICAL HISTORY:  Anal fistula     Diabetes mellitus type II    Dyslipidemia     Hyperuricemia PAST MEDICAL HISTORY:  Anal fistula     Diabetes mellitus type II    Dyslipidemia     Hyperuricemia     Sprain, bicep

## 2020-08-07 NOTE — H&P PST ADULT - NSICDXPROBLEM_GEN_ALL_CORE_FT
PROBLEM DIAGNOSES  Problem: Biceps strain  Assessment and Plan: Pt scheduled for left distal bicep tendon reinsertion on 8/11/2020.  labs done results pending, ekg done.  Preop covid instructions given.  Hibiclens provided:  verbal and written instructions given with teach back, able to verbalize understanding.  Preop teaching done, pt able to verbalize understanding.

## 2020-08-08 ENCOUNTER — APPOINTMENT (OUTPATIENT)
Dept: DISASTER EMERGENCY | Facility: CLINIC | Age: 55
End: 2020-08-08

## 2020-08-08 DIAGNOSIS — Z01.818 ENCOUNTER FOR OTHER PREPROCEDURAL EXAMINATION: ICD-10-CM

## 2020-08-09 LAB — SARS-COV-2 N GENE NPH QL NAA+PROBE: NOT DETECTED

## 2020-08-10 ENCOUNTER — APPOINTMENT (OUTPATIENT)
Dept: ENDOCRINOLOGY | Facility: CLINIC | Age: 55
End: 2020-08-10
Payer: SELF-PAY

## 2020-08-10 ENCOUNTER — APPOINTMENT (OUTPATIENT)
Dept: INTERNAL MEDICINE | Facility: CLINIC | Age: 55
End: 2020-08-10
Payer: SELF-PAY

## 2020-08-10 VITALS
HEART RATE: 105 BPM | DIASTOLIC BLOOD PRESSURE: 70 MMHG | SYSTOLIC BLOOD PRESSURE: 122 MMHG | OXYGEN SATURATION: 97 % | WEIGHT: 148 LBS | TEMPERATURE: 97.9 F | HEIGHT: 66 IN | BODY MASS INDEX: 23.78 KG/M2

## 2020-08-10 VITALS — DIASTOLIC BLOOD PRESSURE: 70 MMHG | SYSTOLIC BLOOD PRESSURE: 130 MMHG

## 2020-08-10 VITALS — WEIGHT: 148 LBS | HEIGHT: 66 IN | BODY MASS INDEX: 23.78 KG/M2

## 2020-08-10 DIAGNOSIS — I10 ESSENTIAL (PRIMARY) HYPERTENSION: ICD-10-CM

## 2020-08-10 DIAGNOSIS — E11.9 TYPE 2 DIABETES MELLITUS W/OUT COMPLICATIONS: ICD-10-CM

## 2020-08-10 DIAGNOSIS — E11.65 TYPE 2 DIABETES MELLITUS WITH HYPERGLYCEMIA: ICD-10-CM

## 2020-08-10 DIAGNOSIS — S46.219A STRAIN OF MUSCLE, FASCIA AND TENDON OF OTHER PARTS OF BICEPS, UNSPECIFIED ARM, INITIAL ENCOUNTER: ICD-10-CM

## 2020-08-10 DIAGNOSIS — E78.00 PURE HYPERCHOLESTEROLEMIA, UNSPECIFIED: ICD-10-CM

## 2020-08-10 LAB
GLUCOSE BLDC GLUCOMTR-MCNC: 345
HBA1C MFR BLD HPLC: >14

## 2020-08-10 PROCEDURE — 99214 OFFICE O/P EST MOD 30 MIN: CPT | Mod: 25

## 2020-08-10 PROCEDURE — 36415 COLL VENOUS BLD VENIPUNCTURE: CPT

## 2020-08-10 PROCEDURE — 83036 HEMOGLOBIN GLYCOSYLATED A1C: CPT | Mod: QW

## 2020-08-10 RX ORDER — BLOOD-GLUCOSE METER
W/DEVICE KIT MISCELLANEOUS
Qty: 1 | Refills: 0 | Status: ACTIVE | COMMUNITY
Start: 2018-10-22 | End: 1900-01-01

## 2020-08-10 RX ORDER — DULAGLUTIDE 1.5 MG/.5ML
1.5 INJECTION, SOLUTION SUBCUTANEOUS
Qty: 1 | Refills: 3 | Status: DISCONTINUED | COMMUNITY
Start: 2017-06-12 | End: 2020-08-10

## 2020-08-10 RX ORDER — DULAGLUTIDE 1.5 MG/.5ML
1.5 INJECTION, SOLUTION SUBCUTANEOUS
Qty: 3 | Refills: 1 | Status: ACTIVE | COMMUNITY
Start: 2020-08-10 | End: 1900-01-01

## 2020-08-10 RX ORDER — INSULIN GLARGINE 100 [IU]/ML
100 INJECTION, SOLUTION SUBCUTANEOUS
Qty: 2 | Refills: 4 | Status: ACTIVE | COMMUNITY
Start: 2020-08-10 | End: 1900-01-01

## 2020-08-10 RX ORDER — BLOOD SUGAR DIAGNOSTIC
STRIP MISCELLANEOUS
Qty: 300 | Refills: 2 | Status: ACTIVE | COMMUNITY
Start: 2018-07-17 | End: 1900-01-01

## 2020-08-10 RX ORDER — PEN NEEDLE, DIABETIC 32GX 5/32"
32G X 4 MM NEEDLE, DISPOSABLE MISCELLANEOUS
Qty: 1 | Refills: 1 | Status: ACTIVE | COMMUNITY
Start: 2020-08-10 | End: 1900-01-01

## 2020-08-10 NOTE — ASSESSMENT
[Patient NOT optimized for Surgery at this time] : Patient not optimized for surgery at this time [FreeTextEntry4] : This the patient was scheduled for left biceps surgery for a torn bicep. However his preop bloods revealed a glucose of 489 and an A1c of over 12. The patient is not presently cleared for surgery.I referred the patient stat to endocrinology

## 2020-08-10 NOTE — HISTORY OF PRESENT ILLNESS
[No Pertinent Pulmonary History] : no history of asthma, COPD, sleep apnea, or smoking [No Pertinent Cardiac History] : no history of aortic stenosis, atrial fibrillation, coronary artery disease, recent myocardial infarction, or implantable device/pacemaker [FreeTextEntry1] : Repair of torn bicep [No Adverse Anesthesia Reaction] : no adverse anesthesia reaction in self or family member [FreeTextEntry4] : This a patient with a history of hypertension, diabetes mellitus, hypercholesterolemia, who is scheduled for repair of the T1 tricep muscle on his left arm. However the patient's sugar is 489 and his A1c is over 12. [FreeTextEntry3] : Dr. Jimenes

## 2020-08-10 NOTE — PHYSICAL EXAM
[de-identified] : torn  left bicep muscle [Normal] : soft, non-tender, non-distended, no masses palpated, no HSM and normal bowel sounds

## 2020-08-11 ENCOUNTER — APPOINTMENT (OUTPATIENT)
Dept: ORTHOPEDIC SURGERY | Facility: AMBULATORY SURGERY CENTER | Age: 55
End: 2020-08-11

## 2020-08-11 LAB
CREAT SPEC-SCNC: 112 MG/DL
MICROALBUMIN 24H UR DL<=1MG/L-MCNC: 1.3 MG/DL
MICROALBUMIN/CREAT 24H UR-RTO: 12 MG/G

## 2020-08-19 ENCOUNTER — APPOINTMENT (OUTPATIENT)
Dept: ORTHOPEDIC SURGERY | Facility: CLINIC | Age: 55
End: 2020-08-19

## 2020-08-21 ENCOUNTER — APPOINTMENT (OUTPATIENT)
Dept: OPHTHALMOLOGY | Facility: CLINIC | Age: 55
End: 2020-08-21

## 2020-10-12 ENCOUNTER — RX RENEWAL (OUTPATIENT)
Age: 55
End: 2020-10-12

## 2020-10-12 RX ORDER — METOPROLOL SUCCINATE 25 MG/1
25 TABLET, EXTENDED RELEASE ORAL
Qty: 180 | Refills: 0 | Status: ACTIVE | COMMUNITY
Start: 2019-07-08 | End: 1900-01-01

## 2020-11-09 NOTE — ED PROVIDER NOTE - PROGRESS NOTE DETAILS
Detail Level: Simple
Chris: Xray elbow with no obvious fracture, dislocations.  Soft tissue injury.  Sling provided for comfort.  Will dc for patient to get MRI outpatient.

## 2020-11-11 ENCOUNTER — APPOINTMENT (OUTPATIENT)
Dept: ORTHOPEDIC SURGERY | Facility: CLINIC | Age: 55
End: 2020-11-11

## 2020-11-11 VITALS — TEMPERATURE: 98.2 F

## 2020-12-01 ENCOUNTER — APPOINTMENT (OUTPATIENT)
Dept: ENDOCRINOLOGY | Facility: CLINIC | Age: 55
End: 2020-12-01

## 2020-12-09 ENCOUNTER — APPOINTMENT (OUTPATIENT)
Dept: ORTHOPEDIC SURGERY | Facility: CLINIC | Age: 55
End: 2020-12-09

## 2020-12-09 ENCOUNTER — APPOINTMENT (OUTPATIENT)
Dept: ORTHOPEDIC SURGERY | Facility: CLINIC | Age: 55
End: 2020-12-09
Payer: COMMERCIAL

## 2020-12-09 VITALS — HEIGHT: 66 IN | BODY MASS INDEX: 24.11 KG/M2 | WEIGHT: 150 LBS | TEMPERATURE: 98 F

## 2020-12-09 DIAGNOSIS — S46.212D STRAIN OF MUSCLE, FASCIA AND TENDON OF OTHER PARTS OF BICEPS, LEFT ARM, SUBSEQUENT ENCOUNTER: ICD-10-CM

## 2020-12-09 DIAGNOSIS — M25.562 PAIN IN RIGHT KNEE: ICD-10-CM

## 2020-12-09 DIAGNOSIS — M25.561 PAIN IN RIGHT KNEE: ICD-10-CM

## 2020-12-09 DIAGNOSIS — G89.29 PAIN IN RIGHT KNEE: ICD-10-CM

## 2020-12-09 PROCEDURE — 99072 ADDL SUPL MATRL&STAF TM PHE: CPT

## 2020-12-09 PROCEDURE — 99213 OFFICE O/P EST LOW 20 MIN: CPT

## 2020-12-28 ENCOUNTER — EMERGENCY (EMERGENCY)
Facility: HOSPITAL | Age: 55
LOS: 1 days | Discharge: HOME | End: 2020-12-28
Attending: EMERGENCY MEDICINE | Admitting: EMERGENCY MEDICINE
Payer: MEDICARE

## 2020-12-28 VITALS
RESPIRATION RATE: 16 BRPM | HEART RATE: 88 BPM | HEIGHT: 66 IN | TEMPERATURE: 98 F | OXYGEN SATURATION: 100 % | DIASTOLIC BLOOD PRESSURE: 96 MMHG | SYSTOLIC BLOOD PRESSURE: 163 MMHG

## 2020-12-28 VITALS
OXYGEN SATURATION: 100 % | TEMPERATURE: 98 F | RESPIRATION RATE: 16 BRPM | SYSTOLIC BLOOD PRESSURE: 176 MMHG | HEART RATE: 97 BPM | DIASTOLIC BLOOD PRESSURE: 104 MMHG

## 2020-12-28 DIAGNOSIS — S96.912S STRAIN OF UNSPECIFIED MUSCLE AND TENDON AT ANKLE AND FOOT LEVEL, LEFT FOOT, SEQUELA: Chronic | ICD-10-CM

## 2020-12-28 DIAGNOSIS — K60.3 ANAL FISTULA: Chronic | ICD-10-CM

## 2020-12-28 PROCEDURE — 93971 EXTREMITY STUDY: CPT | Mod: 26,LT

## 2020-12-28 PROCEDURE — 73562 X-RAY EXAM OF KNEE 3: CPT | Mod: 26,LT,RT

## 2020-12-28 PROCEDURE — 99284 EMERGENCY DEPT VISIT MOD MDM: CPT

## 2020-12-28 RX ORDER — IBUPROFEN 200 MG
1 TABLET ORAL
Qty: 9 | Refills: 0
Start: 2020-12-28 | End: 2020-12-30

## 2020-12-28 RX ORDER — IBUPROFEN 200 MG
600 TABLET ORAL ONCE
Refills: 0 | Status: COMPLETED | OUTPATIENT
Start: 2020-12-28 | End: 2020-12-28

## 2020-12-28 RX ORDER — ACETAMINOPHEN 500 MG
975 TABLET ORAL ONCE
Refills: 0 | Status: COMPLETED | OUTPATIENT
Start: 2020-12-28 | End: 2020-12-28

## 2020-12-28 RX ADMIN — Medication 600 MILLIGRAM(S): at 19:50

## 2020-12-28 RX ADMIN — Medication 975 MILLIGRAM(S): at 19:50

## 2020-12-28 NOTE — ED PROVIDER NOTE - MUSCULOSKELETAL MINIMAL EXAM
limited flexion of right knee secondary to pain. +superior right knee effusion. no calf tenderness. dp pulse 2+. no redness, not hot.

## 2020-12-28 NOTE — ED PROVIDER NOTE - OBJECTIVE STATEMENT
54 y/o male with pmhx of DM presents to ED c/o b/l knee pain. Pt states he has had b/l knee pain x 3 years, his right knee has gotten worse over the past 1 week with swelling. States he has been diagnosed with arthritis for the past couple years. Pain worse with activity and going up stairs. Pt states he went to [facility] this week and had a knee XR which showed an effusion. Pt has been taking motrin for pain with no relief. Pt follows up with orthopedics and recommended nsaids few weeks ago. Pt has a history of COVID 8/2020. States his right knee feels worse than left and feels his right leg is swollen. No fever, chills, cp, sob, weakness, numbness, tingling.

## 2020-12-28 NOTE — ED ADULT TRIAGE NOTE - CHIEF COMPLAINT QUOTE
pt here for right knee pain x 3 years but has gotten progressively worse since last week. pain is causing difficulty ambulating. denies any falls or trauma.

## 2020-12-28 NOTE — ED ADULT NURSE NOTE - CAS EDN DISCHARGE ASSESSMENT
unit  201-250 add 2 units  251-300 add 3 units  > 301 add 4 units) 1 vial 3    metFORMIN (GLUCOPHAGE) 500 MG tablet Take 500 mg by mouth 2 times daily (with meals)      nicotine (NICODERM CQ) 21 MG/24HR Place 1 patch onto the skin daily 30 patch 3     No current facility-administered medications for this encounter. Vital Signs (Current) There were no vitals filed for this visit. Vital Signs Statistics (for past 48 hrs)     No Data Recorded    BP Readings from Last 3 Encounters:   09/12/18 (!) 152/82   09/11/18 (!) 160/79   12/29/17 126/82     BMI  Body mass index is 20.6 kg/m². Estimated body mass index is 20.6 kg/m² as calculated from the following:    Height as of this encounter: 5' 4\" (1.626 m). Weight as of this encounter: 120 lb (54.4 kg). CBC   Lab Results   Component Value Date    WBC 7.6 09/12/2018    RBC 3.78 09/12/2018    HGB 11.4 09/12/2018    HCT 35.1 09/12/2018    MCV 92.7 09/12/2018    RDW 14.6 09/12/2018     09/12/2018     CMP    Lab Results   Component Value Date     09/12/2018    K 4.3 09/12/2018    K 4.1 09/11/2018     09/12/2018    CO2 25 09/12/2018    BUN 9 09/12/2018    CREATININE 1.0 09/12/2018    GFRAA >60 09/12/2018    AGRATIO 1.2 10/15/2017    LABGLOM >60 09/12/2018    GLUCOSE 87 09/12/2018    PROT 6.6 09/12/2018    CALCIUM 8.8 09/12/2018    BILITOT <0.2 09/12/2018    ALKPHOS 90 09/12/2018    AST 23 09/12/2018    ALT 13 09/12/2018     BMP    Lab Results   Component Value Date     09/12/2018    K 4.3 09/12/2018    K 4.1 09/11/2018     09/12/2018    CO2 25 09/12/2018    BUN 9 09/12/2018    CREATININE 1.0 09/12/2018    CALCIUM 8.8 09/12/2018    GFRAA >60 09/12/2018    LABGLOM >60 09/12/2018    GLUCOSE 87 09/12/2018     POCGlucose  No results for input(s): GLUCOSE in the last 72 hours.    [facility]    Lab Results   Component Value Date    PROTIME 11.6 10/15/2017    INR 1.03 10/15/2017    APTT 26.3 24/66/0117     HCG (If Applicable) No results found for:
Alert and oriented to person, place and time

## 2020-12-28 NOTE — ED PROVIDER NOTE - PATIENT PORTAL LINK FT
You can access the FollowMyHealth Patient Portal offered by [facility] by registering at the following website: [email or web address]. By joining [employer]’s FollowMyHealth portal, you will also be able to view your health information using other applications (apps) compatible with our system.

## 2020-12-28 NOTE — ED PROVIDER NOTE - ATTENDING CONTRIBUTION TO CARE
Dr. Ramos:  I performed a face to face bedside interview with patient regarding history of present illness, review of symptoms and past medical history. I completed an independent physical exam.  I have discussed patient's plan of care with PA.   I agree with note as stated above, having amended the EMR as needed to reflect my findings.   This includes HISTORY OF PRESENT ILLNESS, HIV, PAST MEDICAL/SURGICAL/FAMILY/SOCIAL HISTORY, ALLERGIES AND HOME MEDICATIONS, REVIEW OF SYSTEMS, PHYSICAL EXAM, and any PROGRESS NOTES during the time I functioned as the attending physician for this patient.    55M h/o DM presents with B/L knee pain, acute on chronic.  Pt has had knee pain x years, but over past week R knee much worse with swelling.  Denies acute trauma, ROS otherwise negative.    Exam:  - nad  - rrr  - ctab  - abd soft ntnd  - +mild swelling above right knee, pain with range of motion R knee, no warmth/redness to area    A/P  - R knee pain with leg swelling r/o DVT, occult fracture  - xr knee, US RLE

## 2020-12-28 NOTE — ED PROVIDER NOTE - CLINICAL SUMMARY MEDICAL DECISION MAKING FREE TEXT BOX
56 y/o male with pmhx of DM presents to ED c/o b/l knee pain. Pt states he has had b/l knee pain x 3 years, his right knee has gotten worse over the past 1 week with swelling. States he has been diagnosed with arthritis for the past couple years. Pain worse with activity and going up stairs. Pt states he went to [facility] this week and had a knee XR which showed an effusion. Pt has been taking motrin for pain with no relief. Pt follows up with orthopedics and recommended nsaids few weeks ago. Pt has a history of COVID 8/2020. States his right knee feels worse than left and feels his right leg is swollen. on exam, pt with right knee joint effusion, no skin changes, afebrile, limited flexion secondary to pain. concern for arthritic effusion- plan to check XR, pain control, reassess. refer to ortho outpatient, r/o DVT.

## 2020-12-28 NOTE — ED PROVIDER NOTE - NSFOLLOWUPINSTRUCTIONS_ED_ALL_ED_FT
Rest, ice, elevate area.  Take Motrin 600mg every 6-8 hrs with food for pain. Take Tylenol 650mg (Two 325 mg pills) every 4-6 hours as needed for pain.   Follow up with PMD within 48-72 hrs. Follow up with the Orthopedist doctor within the week to discuss possible MRI vs. Physical therapy- referral list given.          Any worsening pain, swelling, weakness, numbness or any NEW CONCERNING symptoms return to the Emergency Dept.

## 2020-12-31 ENCOUNTER — APPOINTMENT (OUTPATIENT)
Dept: ORTHOPEDIC SURGERY | Facility: CLINIC | Age: 55
End: 2020-12-31
Payer: COMMERCIAL

## 2020-12-31 VITALS
DIASTOLIC BLOOD PRESSURE: 121 MMHG | HEART RATE: 90 BPM | SYSTOLIC BLOOD PRESSURE: 192 MMHG | WEIGHT: 145 LBS | BODY MASS INDEX: 23.3 KG/M2 | HEIGHT: 66 IN

## 2020-12-31 VITALS — TEMPERATURE: 97.7 F

## 2020-12-31 DIAGNOSIS — M25.461 EFFUSION, RIGHT KNEE: ICD-10-CM

## 2020-12-31 PROCEDURE — 99214 OFFICE O/P EST MOD 30 MIN: CPT | Mod: 25

## 2020-12-31 PROCEDURE — 20610 DRAIN/INJ JOINT/BURSA W/O US: CPT | Mod: RT

## 2020-12-31 PROCEDURE — 99072 ADDL SUPL MATRL&STAF TM PHE: CPT

## 2021-01-04 PROBLEM — M25.461 KNEE EFFUSION, RIGHT: Status: ACTIVE | Noted: 2021-01-04

## 2021-05-21 ENCOUNTER — INPATIENT (INPATIENT)
Facility: HOSPITAL | Age: 56
LOS: 5 days | Discharge: HOME | DRG: 862 | End: 2021-05-27
Attending: INTERNAL MEDICINE | Admitting: INTERNAL MEDICINE
Payer: MEDICAID

## 2021-05-21 VITALS
HEIGHT: 66 IN | TEMPERATURE: 98 F | DIASTOLIC BLOOD PRESSURE: 116 MMHG | HEART RATE: 104 BPM | SYSTOLIC BLOOD PRESSURE: 203 MMHG | RESPIRATION RATE: 17 BRPM | WEIGHT: 149.91 LBS

## 2021-05-21 DIAGNOSIS — L03.113 CELLULITIS OF RIGHT UPPER LIMB: ICD-10-CM

## 2021-05-21 DIAGNOSIS — A41.9 SEPSIS, UNSPECIFIED ORGANISM: ICD-10-CM

## 2021-05-21 DIAGNOSIS — I16.0 HYPERTENSIVE URGENCY: ICD-10-CM

## 2021-05-21 DIAGNOSIS — R65.10 SYSTEMIC INFLAMMATORY RESPONSE SYNDROME (SIRS) OF NON-INFECTIOUS ORIGIN WITHOUT ACUTE ORGAN DYSFUNCTION: ICD-10-CM

## 2021-05-21 DIAGNOSIS — E11.9 TYPE 2 DIABETES MELLITUS WITHOUT COMPLICATIONS: ICD-10-CM

## 2021-05-21 DIAGNOSIS — K60.3 ANAL FISTULA: Chronic | ICD-10-CM

## 2021-05-21 DIAGNOSIS — M79.89 OTHER SPECIFIED SOFT TISSUE DISORDERS: ICD-10-CM

## 2021-05-21 DIAGNOSIS — S96.912S STRAIN OF UNSPECIFIED MUSCLE AND TENDON AT ANKLE AND FOOT LEVEL, LEFT FOOT, SEQUELA: Chronic | ICD-10-CM

## 2021-05-21 DIAGNOSIS — Z29.9 ENCOUNTER FOR PROPHYLACTIC MEASURES, UNSPECIFIED: ICD-10-CM

## 2021-05-21 LAB
ALBUMIN SERPL ELPH-MCNC: 3.5 G/DL — SIGNIFICANT CHANGE UP (ref 3.5–5)
ALP SERPL-CCNC: 79 U/L — SIGNIFICANT CHANGE UP (ref 40–120)
ALT FLD-CCNC: 31 U/L DA — SIGNIFICANT CHANGE UP (ref 10–60)
ANION GAP SERPL CALC-SCNC: 13 MMOL/L — SIGNIFICANT CHANGE UP (ref 5–17)
APPEARANCE UR: CLEAR — SIGNIFICANT CHANGE UP
APTT BLD: 27.7 SEC — SIGNIFICANT CHANGE UP (ref 27.5–35.5)
AST SERPL-CCNC: 15 U/L — SIGNIFICANT CHANGE UP (ref 10–40)
BASOPHILS # BLD AUTO: 0.05 K/UL — SIGNIFICANT CHANGE UP (ref 0–0.2)
BASOPHILS NFR BLD AUTO: 0.3 % — SIGNIFICANT CHANGE UP (ref 0–2)
BILIRUB SERPL-MCNC: 1 MG/DL — SIGNIFICANT CHANGE UP (ref 0.2–1.2)
BILIRUB UR-MCNC: NEGATIVE — SIGNIFICANT CHANGE UP
BUN SERPL-MCNC: 11 MG/DL — SIGNIFICANT CHANGE UP (ref 7–18)
CALCIUM SERPL-MCNC: 9.2 MG/DL — SIGNIFICANT CHANGE UP (ref 8.4–10.5)
CHLORIDE SERPL-SCNC: 98 MMOL/L — SIGNIFICANT CHANGE UP (ref 96–108)
CO2 SERPL-SCNC: 21 MMOL/L — LOW (ref 22–31)
COLOR SPEC: YELLOW — SIGNIFICANT CHANGE UP
CREAT SERPL-MCNC: 1.03 MG/DL — SIGNIFICANT CHANGE UP (ref 0.5–1.3)
DIFF PNL FLD: NEGATIVE — SIGNIFICANT CHANGE UP
EOSINOPHIL # BLD AUTO: 0.04 K/UL — SIGNIFICANT CHANGE UP (ref 0–0.5)
EOSINOPHIL NFR BLD AUTO: 0.3 % — SIGNIFICANT CHANGE UP (ref 0–6)
GLUCOSE BLDC GLUCOMTR-MCNC: 245 MG/DL — HIGH (ref 70–99)
GLUCOSE BLDC GLUCOMTR-MCNC: 283 MG/DL — HIGH (ref 70–99)
GLUCOSE BLDC GLUCOMTR-MCNC: 347 MG/DL — HIGH (ref 70–99)
GLUCOSE SERPL-MCNC: 396 MG/DL — HIGH (ref 70–99)
GLUCOSE UR QL: 1000 MG/DL
HCT VFR BLD CALC: 41.5 % — SIGNIFICANT CHANGE UP (ref 39–50)
HGB BLD-MCNC: 14 G/DL — SIGNIFICANT CHANGE UP (ref 13–17)
IMM GRANULOCYTES NFR BLD AUTO: 0.4 % — SIGNIFICANT CHANGE UP (ref 0–1.5)
INR BLD: 1.12 RATIO — SIGNIFICANT CHANGE UP (ref 0.88–1.16)
KETONES UR-MCNC: ABNORMAL
LACTATE SERPL-SCNC: 2 MMOL/L — SIGNIFICANT CHANGE UP (ref 0.7–2)
LACTATE SERPL-SCNC: 2.4 MMOL/L — HIGH (ref 0.7–2)
LEUKOCYTE ESTERASE UR-ACNC: NEGATIVE — SIGNIFICANT CHANGE UP
LYMPHOCYTES # BLD AUTO: 1.48 K/UL — SIGNIFICANT CHANGE UP (ref 1–3.3)
LYMPHOCYTES # BLD AUTO: 9.9 % — LOW (ref 13–44)
MCHC RBC-ENTMCNC: 27.4 PG — SIGNIFICANT CHANGE UP (ref 27–34)
MCHC RBC-ENTMCNC: 33.7 GM/DL — SIGNIFICANT CHANGE UP (ref 32–36)
MCV RBC AUTO: 81.2 FL — SIGNIFICANT CHANGE UP (ref 80–100)
MONOCYTES # BLD AUTO: 1.23 K/UL — HIGH (ref 0–0.9)
MONOCYTES NFR BLD AUTO: 8.2 % — SIGNIFICANT CHANGE UP (ref 2–14)
NEUTROPHILS # BLD AUTO: 12.09 K/UL — HIGH (ref 1.8–7.4)
NEUTROPHILS NFR BLD AUTO: 80.9 % — HIGH (ref 43–77)
NITRITE UR-MCNC: NEGATIVE — SIGNIFICANT CHANGE UP
NRBC # BLD: 0 /100 WBCS — SIGNIFICANT CHANGE UP (ref 0–0)
PH UR: 7 — SIGNIFICANT CHANGE UP (ref 5–8)
PLATELET # BLD AUTO: 282 K/UL — SIGNIFICANT CHANGE UP (ref 150–400)
POTASSIUM SERPL-MCNC: 4.5 MMOL/L — SIGNIFICANT CHANGE UP (ref 3.5–5.3)
POTASSIUM SERPL-SCNC: 4.5 MMOL/L — SIGNIFICANT CHANGE UP (ref 3.5–5.3)
PROT SERPL-MCNC: 8.2 G/DL — SIGNIFICANT CHANGE UP (ref 6–8.3)
PROT UR-MCNC: NEGATIVE — SIGNIFICANT CHANGE UP
PROTHROM AB SERPL-ACNC: 13.3 SEC — SIGNIFICANT CHANGE UP (ref 10.6–13.6)
RBC # BLD: 5.11 M/UL — SIGNIFICANT CHANGE UP (ref 4.2–5.8)
RBC # FLD: 11.9 % — SIGNIFICANT CHANGE UP (ref 10.3–14.5)
SARS-COV-2 RNA SPEC QL NAA+PROBE: SIGNIFICANT CHANGE UP
SODIUM SERPL-SCNC: 132 MMOL/L — LOW (ref 135–145)
SP GR SPEC: 1.01 — SIGNIFICANT CHANGE UP (ref 1.01–1.02)
UROBILINOGEN FLD QL: NEGATIVE — SIGNIFICANT CHANGE UP
WBC # BLD: 14.95 K/UL — HIGH (ref 3.8–10.5)
WBC # FLD AUTO: 14.95 K/UL — HIGH (ref 3.8–10.5)

## 2021-05-21 PROCEDURE — 73130 X-RAY EXAM OF HAND: CPT | Mod: 26,RT

## 2021-05-21 PROCEDURE — 99221 1ST HOSP IP/OBS SF/LOW 40: CPT

## 2021-05-21 PROCEDURE — 99291 CRITICAL CARE FIRST HOUR: CPT

## 2021-05-21 RX ORDER — VANCOMYCIN HCL 1 G
VIAL (EA) INTRAVENOUS
Refills: 0 | Status: DISCONTINUED | OUTPATIENT
Start: 2021-05-21 | End: 2021-05-23

## 2021-05-21 RX ORDER — OXYCODONE AND ACETAMINOPHEN 5; 325 MG/1; MG/1
2 TABLET ORAL EVERY 4 HOURS
Refills: 0 | Status: DISCONTINUED | OUTPATIENT
Start: 2021-05-21 | End: 2021-05-25

## 2021-05-21 RX ORDER — ACETAMINOPHEN 500 MG
1000 TABLET ORAL ONCE
Refills: 0 | Status: DISCONTINUED | OUTPATIENT
Start: 2021-05-21 | End: 2021-05-21

## 2021-05-21 RX ORDER — VANCOMYCIN HCL 1 G
1000 VIAL (EA) INTRAVENOUS ONCE
Refills: 0 | Status: COMPLETED | OUTPATIENT
Start: 2021-05-21 | End: 2021-05-21

## 2021-05-21 RX ORDER — INSULIN GLARGINE 100 [IU]/ML
0 INJECTION, SOLUTION SUBCUTANEOUS
Qty: 0 | Refills: 0 | DISCHARGE

## 2021-05-21 RX ORDER — OXYCODONE AND ACETAMINOPHEN 5; 325 MG/1; MG/1
1 TABLET ORAL ONCE
Refills: 0 | Status: DISCONTINUED | OUTPATIENT
Start: 2021-05-21 | End: 2021-05-21

## 2021-05-21 RX ORDER — MORPHINE SULFATE 50 MG/1
6 CAPSULE, EXTENDED RELEASE ORAL ONCE
Refills: 0 | Status: DISCONTINUED | OUTPATIENT
Start: 2021-05-21 | End: 2021-05-21

## 2021-05-21 RX ORDER — METFORMIN HYDROCHLORIDE 850 MG/1
1 TABLET ORAL
Qty: 0 | Refills: 0 | DISCHARGE

## 2021-05-21 RX ORDER — METOPROLOL TARTRATE 50 MG
50 TABLET ORAL ONCE
Refills: 0 | Status: COMPLETED | OUTPATIENT
Start: 2021-05-21 | End: 2021-05-21

## 2021-05-21 RX ORDER — LOSARTAN POTASSIUM 100 MG/1
0 TABLET, FILM COATED ORAL
Qty: 0 | Refills: 0 | DISCHARGE

## 2021-05-21 RX ORDER — ENOXAPARIN SODIUM 100 MG/ML
40 INJECTION SUBCUTANEOUS DAILY
Refills: 0 | Status: DISCONTINUED | OUTPATIENT
Start: 2021-05-21 | End: 2021-05-27

## 2021-05-21 RX ORDER — VANCOMYCIN HCL 1 G
1000 VIAL (EA) INTRAVENOUS EVERY 12 HOURS
Refills: 0 | Status: DISCONTINUED | OUTPATIENT
Start: 2021-05-22 | End: 2021-05-23

## 2021-05-21 RX ORDER — INSULIN GLARGINE 100 [IU]/ML
5 INJECTION, SOLUTION SUBCUTANEOUS AT BEDTIME
Refills: 0 | Status: DISCONTINUED | OUTPATIENT
Start: 2021-05-21 | End: 2021-05-23

## 2021-05-21 RX ORDER — AMPICILLIN SODIUM AND SULBACTAM SODIUM 250; 125 MG/ML; MG/ML
3 INJECTION, POWDER, FOR SUSPENSION INTRAMUSCULAR; INTRAVENOUS ONCE
Refills: 0 | Status: COMPLETED | OUTPATIENT
Start: 2021-05-21 | End: 2021-05-21

## 2021-05-21 RX ORDER — POTASSIUM CHLORIDE 20 MEQ
10 PACKET (EA) ORAL
Refills: 0 | Status: DISCONTINUED | OUTPATIENT
Start: 2021-05-21 | End: 2021-05-21

## 2021-05-21 RX ORDER — OXYCODONE AND ACETAMINOPHEN 5; 325 MG/1; MG/1
2 TABLET ORAL ONCE
Refills: 0 | Status: DISCONTINUED | OUTPATIENT
Start: 2021-05-21 | End: 2021-05-21

## 2021-05-21 RX ORDER — AMPICILLIN SODIUM AND SULBACTAM SODIUM 250; 125 MG/ML; MG/ML
1.5 INJECTION, POWDER, FOR SUSPENSION INTRAMUSCULAR; INTRAVENOUS EVERY 6 HOURS
Refills: 0 | Status: DISCONTINUED | OUTPATIENT
Start: 2021-05-21 | End: 2021-05-21

## 2021-05-21 RX ORDER — OXYCODONE AND ACETAMINOPHEN 5; 325 MG/1; MG/1
1 TABLET ORAL EVERY 6 HOURS
Refills: 0 | Status: DISCONTINUED | OUTPATIENT
Start: 2021-05-21 | End: 2021-05-21

## 2021-05-21 RX ORDER — INSULIN LISPRO 100/ML
VIAL (ML) SUBCUTANEOUS
Refills: 0 | Status: DISCONTINUED | OUTPATIENT
Start: 2021-05-21 | End: 2021-05-22

## 2021-05-21 RX ORDER — LOSARTAN POTASSIUM 100 MG/1
50 TABLET, FILM COATED ORAL DAILY
Refills: 0 | Status: DISCONTINUED | OUTPATIENT
Start: 2021-05-21 | End: 2021-05-26

## 2021-05-21 RX ORDER — CEFAZOLIN SODIUM 1 G
2000 VIAL (EA) INJECTION ONCE
Refills: 0 | Status: COMPLETED | OUTPATIENT
Start: 2021-05-21 | End: 2021-05-21

## 2021-05-21 RX ORDER — METOPROLOL TARTRATE 50 MG
0 TABLET ORAL
Qty: 0 | Refills: 0 | DISCHARGE

## 2021-05-21 RX ORDER — ATORVASTATIN CALCIUM 80 MG/1
0 TABLET, FILM COATED ORAL
Qty: 0 | Refills: 0 | DISCHARGE

## 2021-05-21 RX ORDER — SODIUM CHLORIDE 9 MG/ML
2100 INJECTION INTRAMUSCULAR; INTRAVENOUS; SUBCUTANEOUS ONCE
Refills: 0 | Status: COMPLETED | OUTPATIENT
Start: 2021-05-21 | End: 2021-05-21

## 2021-05-21 RX ADMIN — Medication 250 MILLIGRAM(S): at 22:40

## 2021-05-21 RX ADMIN — MORPHINE SULFATE 6 MILLIGRAM(S): 50 CAPSULE, EXTENDED RELEASE ORAL at 10:47

## 2021-05-21 RX ADMIN — OXYCODONE AND ACETAMINOPHEN 1 TABLET(S): 5; 325 TABLET ORAL at 17:37

## 2021-05-21 RX ADMIN — OXYCODONE AND ACETAMINOPHEN 1 TABLET(S): 5; 325 TABLET ORAL at 14:47

## 2021-05-21 RX ADMIN — SODIUM CHLORIDE 2100 MILLILITER(S): 9 INJECTION INTRAMUSCULAR; INTRAVENOUS; SUBCUTANEOUS at 10:48

## 2021-05-21 RX ADMIN — MORPHINE SULFATE 6 MILLIGRAM(S): 50 CAPSULE, EXTENDED RELEASE ORAL at 13:00

## 2021-05-21 RX ADMIN — OXYCODONE AND ACETAMINOPHEN 2 TABLET(S): 5; 325 TABLET ORAL at 21:28

## 2021-05-21 RX ADMIN — AMPICILLIN SODIUM AND SULBACTAM SODIUM 200 GRAM(S): 250; 125 INJECTION, POWDER, FOR SUSPENSION INTRAMUSCULAR; INTRAVENOUS at 13:43

## 2021-05-21 RX ADMIN — MORPHINE SULFATE 6 MILLIGRAM(S): 50 CAPSULE, EXTENDED RELEASE ORAL at 11:15

## 2021-05-21 RX ADMIN — Medication 100 MILLIGRAM(S): at 12:35

## 2021-05-21 RX ADMIN — INSULIN GLARGINE 5 UNIT(S): 100 INJECTION, SOLUTION SUBCUTANEOUS at 21:58

## 2021-05-21 RX ADMIN — OXYCODONE AND ACETAMINOPHEN 1 TABLET(S): 5; 325 TABLET ORAL at 13:43

## 2021-05-21 RX ADMIN — Medication 250 MILLIGRAM(S): at 10:48

## 2021-05-21 RX ADMIN — SODIUM CHLORIDE 2100 MILLILITER(S): 9 INJECTION INTRAMUSCULAR; INTRAVENOUS; SUBCUTANEOUS at 12:37

## 2021-05-21 RX ADMIN — OXYCODONE AND ACETAMINOPHEN 1 TABLET(S): 5; 325 TABLET ORAL at 18:15

## 2021-05-21 RX ADMIN — LOSARTAN POTASSIUM 50 MILLIGRAM(S): 100 TABLET, FILM COATED ORAL at 16:15

## 2021-05-21 RX ADMIN — OXYCODONE AND ACETAMINOPHEN 2 TABLET(S): 5; 325 TABLET ORAL at 22:10

## 2021-05-21 RX ADMIN — MORPHINE SULFATE 6 MILLIGRAM(S): 50 CAPSULE, EXTENDED RELEASE ORAL at 12:35

## 2021-05-21 RX ADMIN — Medication 50 MILLIGRAM(S): at 13:43

## 2021-05-21 NOTE — ED PROVIDER NOTE - OBJECTIVE STATEMENT
Patient reports he had surgery on his right hand 2 weeks ago at [facility]. Was having continued pain. Hand surgeon injected his hand on 5/19 for pain control and started him on clindamycin. Pain became much worse and hand became swollen. No fever, cp, sob, n/v/d.

## 2021-05-21 NOTE — H&P ADULT - ATTENDING COMMENTS
Seen and examined . IV Abxs . ID and Hand surgery consulted. Pain meds . Keep it elevated. Restarting BP medication. Seen and examined . IV Abxs . ID was consulted and Hand surgery was consulted by ED . Spoke to plastics Dr. Duran [phone] and will see pt.  Pain meds . Keep it elevated. Restarting BP medication.

## 2021-05-21 NOTE — CHART NOTE - NSCHARTNOTEFT_GEN_A_CORE
EVENT:   55 year old male with right hand tendon rupture, s/p surgery on his right hand 2 weeks ago for dupuytren contracture. Patient was admitted for right hand infection, swelling and pain.   5/21/21, 9 : 39pm, called by RN for re: patient c/o severe right hand pain and request for medication. Last dose of Percocet was given at 5pm. Next dose - after 11pm.   Patient was assessed at bedside. Right hand was swollen. Patient reported having continuous severe 10/10 level pain in his right hand with swelling. Pain extended from hand all the way to the shoulders. He had difficulty moving his hand. Patient requested two tabs. Percocet.          OBJECTIVE:  Vital Signs Last 24 Hrs  T(C): 37.6 (21 May 2021 20:39), Max: 37.6 (21 May 2021 20:39)  T(F): 99.7 (21 May 2021 20:39), Max: 99.7 (21 May 2021 20:39)  HR: 105 (21 May 2021 20:39) (94 - 105)  BP: 172/110 (21 May 2021 20:39) (167/97 - 203/116)  BP(mean): --  RR: 20 (21 May 2021 20:39) (17 - 20)  SpO2: 100% (21 May 2021 20:39) (100% - 100%)    FOCUSED PHYSICAL EXAM:    LABS:                        14.0   14.95 )-----------( 282      ( 21 May 2021 11:04 )             41.5     05-21    132<L>  |  98  |  11  ----------------------------<  396<H>  4.5   |  21<L>  |  1.03    Ca    9.2      21 May 2021 11:04    TPro  8.2  /  Alb  3.5  /  TBili  1.0  /  DBili  x   /  AST  15  /  ALT  31  /  AlkPhos  79  05-21      EKG:   IMAGING:    ASSESSMENT:  HPI:  Patient, 55 y old male with PMH of DM and tendon rupture presents to the ED with complaint of right hand pain. Patient reports he had surgery on his right hand 2 weeks ago at [facility] for dupuytren contracture. His doctor told him that the stitches would fall off by Monday but they did not and patient went to his doctor's office on Wed to get stitches removed. Also reports hand being inflamed. According to him, he got local anasthesia and the doctor hit his nerve? as per him. Reports having continuous severe pain in his right hand since then along with swelling. His doctor was not working so he came to the ED due to pain. He took Motrin, Tylenol, Advil with no relief. No fever, cp, sob, vomiting.. Endorses chills and nausea. Reports pain extending from hand all the way to the shoulders, has difficulty moving his hand. Patient plays cricket and has h/o tendon rupture.  (21 May 2021 15:57)      PLAN:   - Percocet 2 tabs Q4hrs PRN for severe pain (7-10) level with first stat dose,   - Pain management consult for acute pain (tendon rupture, s/p right hand surgery 2 weeks ago).      FOLLOW UP / RESULT:   - Reassess patient pain level,   - Continue supportive care and safety measures.

## 2021-05-21 NOTE — ED PROVIDER NOTE - CLINICAL SUMMARY MEDICAL DECISION MAKING FREE TEXT BOX
Patient with right hand infection causing sepsis. Requires inpatient admission for IV antibiotics and hand consult.

## 2021-05-21 NOTE — CONSULT NOTE ADULT - ASSESSMENT
55 y old male with PMH of DM , tendon rupture , s/p surgery for dupuytren contracture on 05/07 in [facility] presents to the ED with complaint of right hand pain. Pain is mostly in the palm area described as sharp, intense, inflamed and increased with movement . Pt is unable to . saw his Surgeon on Wed to get stitches removed where he got local anesthesia and the doctor hit his nerve?as per him. Admits hand swelling, nausea and chills  Also reports pain extending from hand all the way to the shoulders, has difficulty moving his hand.  Plastic surgery was called to r/o a compactment syndrome . Of note pt arrived at about 10am and was admitted by medicine by 3:30pm but was only consulted at about 9:30pm       Spoke extensively to Dr Fernando( the MAR)  that this is a post op complication that will be best served by the primary surgeon and therefore pt should be transferred immediately.  Other care/mgmt per primary team   Can reconsult plastic surgery prn   55 y old male with PMH of DM , tendon rupture , s/p surgery for dupuytren contracture on 05/07 in [facility] presents to the ED with complaint of right hand pain. Pain is mostly in the palm area described as sharp, intense, inflamed and increased with movement . Pt is unable to . saw his Surgeon on Wed to get stitches removed where he got local anesthesia and the doctor hit his nerve?as per him. Admits hand swelling, nausea and chills  Also reports pain extending from hand all the way to the shoulders, has difficulty moving his hand.  Plastic surgery was called to r/o a compactment syndrome . Of note pt arrived at about 10am and was admitted by medicine by 3:30pm but was only consulted at about 9:30pm       Spoke extensively to Dr Fernando( the MAR)  that this is a post op complication/infection that will be best served by the primary surgeon in [place] and therefore pt should be transferred immediately to the hospital  Other care/mgmt per primary team   Can reconsult plastic surgery prn

## 2021-05-21 NOTE — ED PROVIDER NOTE - PROGRESS NOTE DETAILS
Spoke with plastics Dr. Duran [phone]. Relayed all relevant aspects of the case. She recommended admission to medicine for IV antibiotics. She will see patient in the hospital later today. Code sepsis called for leukocytosis and tachycardia. Labs already sent, fluids and abx given. Patient resting comfortably, feels moderately improved. BP still high. Patient reports his doctor prescribed him lopressor 50mg 3 weeks ago but he hasn't been taking it. Will give lopressor. Patient also says pain is becoming severe again. will give Percocet. Endorsed to Dr. Hinds.

## 2021-05-21 NOTE — CONSULT NOTE ADULT - ASSESSMENT
Patient is a 55y old  Male with PMH of DM and tendon rupture, now presents to the ER for evaluation of right hand pain. He had surgery on his right hand 2 weeks ago at [facility] for Dupuytren contracture. His doctor told him that the stitches would fall off by Monday but they did not and patient went to his Surgeon's office on Wed to get stitches removed. Also reports hand being inflamed . According to him, he got local anesthesia  and the doctor hit his ? nerve. He has severe pain in his right hand since then along with swelling. His doctor was not working so he came to the ER due to pain. He took Motrin, Tylenol, Advil with no relief. He has no fever, but chills and nausea. Reports pain extending from hand all the way to the shoulders, has difficulty moving his hand. Patient plays cricket and has h/o tendon rupture. ON admission, he has no fever but Tachycardia and Leukocytosis. The XRay of right hand has not revealing. He has started on Unasyn, and the ID consult requested to assist with further evaluation and antibiotic management.     # Sepsis ( Tachycardia + Leukocytosis )  # Right hand post-op wound infection    would recommend:    1. Follow up Blood cultures  2. Add Cefepime and Unasyn to Vancomycin until work up is done  3. Please optimize pain management as needed  4. Follow up Plastic Surgery/Surgery recommendation regarding Compartment syndrome  5. Monitor WBC count  6. Local wound care    will follow the patient with you and make further recommendation based on the clinical course and Lab results  Thank you for the opportunity to participate in Mr. ORDAZ's care    Attending Attestation:    Spent more than 65 minutes on total encounter, more than 50 % of the visit was spent counseling and/or coordinating care by the Attending physician.

## 2021-05-21 NOTE — H&P ADULT - PROBLEM SELECTOR PLAN 1
pt p/w tachycardia and leucocytosis meeting 2 of SIRS criteria  lactate 2.4 now normal  s/p vanc, ancef and unasyn in ED  UA negative  likely due to cellulitis  start on IV antibiotic per ID  f/u urine and blood cx  ID Dr Lunsford

## 2021-05-21 NOTE — H&P ADULT - NSICDXPASTMEDICALHX_GEN_ALL_CORE_FT
PAST MEDICAL HISTORY:  Anal fistula     Diabetes mellitus type II    Dyslipidemia     Hyperuricemia     Sprain, bicep

## 2021-05-21 NOTE — H&P ADULT - HISTORY OF PRESENT ILLNESS
Patient reports he had surgery on his right hand 2 weeks ago at [facility]. Was having continued pain. Hand surgeon injected his hand on 5/19 for pain control and started him on clindamycin. Pain became much worse and hand became swollen. No fever, cp, sob, n/v/d Patient, 55 y old male with PMH of DM and tendon rupture presents to the ED with complaint of right hand pain. Patient reports he had surgery on his right hand 2 weeks ago at [facility] for dupuytren contracture. His doctor told him that the stitches would fall off by Monday but they did not and patient went to his doctor's office on Wed to get stitches removed. Also reports hand being inflammed. According to him, he got local anasthesia and the doctor hit his nerve?as per him. Reports having continuous severe pain in his right hand since then along with swelling. His doctor was not working so he came to the ED due to pain. He took motrin, tylenol, advil with no relief. No fever, cp, sob, n/v/d.Reports pain extending from hand all the way to the shoulders, has difficulty moving his hand. Patient plays cricket and has h/o tendon rupture.  Patient, 55 y old male with PMH of DM and tendon rupture presents to the ED with complaint of right hand pain. Patient reports he had surgery on his right hand 2 weeks ago at [facility] for dupuytren contracture. His doctor told him that the stitches would fall off by Monday but they did not and patient went to his doctor's office on Wed to get stitches removed. Also reports hand being inflammed. According to him, he got local anasthesia and the doctor hit his nerve?as per him. Reports having continuous severe pain in his right hand since then along with swelling. His doctor was not working so he came to the ED due to pain. He took motrin, tylenol, advil with no relief. No fever, cp, sob, vomiting.. Endorses chills and nausea. Reports pain extending from hand all the way to the shoulders, has difficulty moving his hand. Patient plays cricket and has h/o tendon rupture.

## 2021-05-21 NOTE — H&P ADULT - ASSESSMENT
Patient, 55 y old male with PMH of DM and tendon rupture presents to the ED with complaint of right hand pain and swelling.   X-ray hand shows No bone destruction or fracture.

## 2021-05-21 NOTE — H&P ADULT - PROBLEM SELECTOR PLAN 2
pt p/w right hand swelling and pain  start on iv antibiotic  c/w pain control prn   f/u cultures   f/u doppler  ID Dr Lunsford pt p/w right hand swelling and pain  start on iv antibiotic  c/w pain control prn   f/u cultures   f/u doppler  ID Dr Lunsford  concern for compartment syndrome due to worsening pain and swelling- hand surgeon unable to be reached (office  closeD)  called surgical PA, he suggests patient should be transferred as we do not have hand surgeon here  informed attending Dr Hinds

## 2021-05-21 NOTE — CONSULT NOTE ADULT - SUBJECTIVE AND OBJECTIVE BOX
PLASTIC SURGERY CONSULT NOTE     chief complaint of hand pain (21 May 2021 19:12)      HPI:  Patient, 55 y old male with PMH of DM , tendon rupture , s/p surgery for dupuytren contracture on  in [facility] presents to the ED with complaint of right hand pain. Pain is mostly in the palm area described as sharp, intense, inflamed and increased with movement . Pt is unable to . saw his Surgeon on Wed to get stitches removed where he got local anesthesia and the doctor hit his nerve?as per him. Admits hand swelling, nausea and chills but denies fever, chills, cp, sob, vomiting. Also reports pain extending from hand all the way to the shoulders, has difficulty moving his hand. Patient plays cricket and has h/o tendon rupture. No other complaints     Plastic surgery was called to r/o a compactment syndrome . Of note pt arrived at about 10am and was admitted by medicine by 3:30pm but was only consulted at about 9:30pm      PAST MEDICAL & SURGICAL HISTORY:  Diabetes mellitus  type II    Hyperuricemia    Dyslipidemia    Anal fistula    Sprain, bicep    Hx of appendectomy      Anal fistula  Ananl fistula repair 2013    Tendon tear, ankle, left, sequela  2016        Review of Systems:    I have reviewed 9 systems with the patient and the only positive findings were     MEDICATIONS  (STANDING):  ampicillin/sulbactam  IVPB 1.5 Gram(s) IV Intermittent every 6 hours  enoxaparin Injectable 40 milliGRAM(s) SubCutaneous daily  insulin glargine Injectable (LANTUS) 5 Unit(s) SubCutaneous at bedtime  insulin lispro (ADMELOG) corrective regimen sliding scale   SubCutaneous three times a day before meals  losartan 50 milliGRAM(s) Oral daily    MEDICATIONS  (PRN):  oxycodone    5 mG/acetaminophen 325 mG 2 Tablet(s) Oral every 4 hours PRN Severe Pain (7 - 10)      Allergies    Vasotec (Hives)    Intolerances        Social History:  drinks occasionally  does not smoke or take other drugs (21 May 2021 15:57)      FAMILY HISTORY:  Family history of hypertension (Mother)    Family history of diabetic complications (Mother)        Vital Signs Last 24 Hrs  T(C): 37.6 (21 May 2021 20:39), Max: 37.6 (21 May 2021 20:39)  T(F): 99.7 (21 May 2021 20:39), Max: 99.7 (21 May 2021 20:39)  HR: 105 (21 May 2021 20:39) (94 - 105)  BP: 172/110 (21 May 2021 20:39) (167/97 - 203/116)  BP(mean): --  RR: 20 (21 May 2021 20:39) (17 - 20)  SpO2: 100% (21 May 2021 20:39) (100% - 100%)    Physical Exam:  Vital Signs Last 24 Hrs  T(C): 37.6 (21 May 2021 20:39), Max: 37.6 (21 May 2021 20:39)  T(F): 99.7 (21 May 2021 20:39), Max: 99.7 (21 May 2021 20:39)  HR: 105 (21 May 2021 20:39) (94 - 105)  BP: 172/110 (21 May 2021 20:39) (167/97 - 203/116)  BP(mean): --  RR: 20 (21 May 2021 20:39) (17 - 20)  SpO2: 100% (21 May 2021 20:39) (100% - 100%)    General:  A&Ox3,Appears stated age, No acute distress,  Head: NC/AT  EENT: PERRLA. EOMI. Conjunctiva and sclera clear. Pharynx clear.  Neck: Supple. No JVD  Lungs: CTA B/l. Nonlabored Respirations  CV: +S1S2, RRR  Abdomen: Soft, Nondistended,  Nontender, no guarding, no rebound  Extremities: Rt hand stiff, tender to palpation, swelling and erythema extending to fingers, unable to appose and oppose fingers, sensory functions also diminished , some granulating tissues in the hypothenar and palm area. Unable to  and wrist area also with some erythema  All other ext Warm and well perfused. 2+ peripheral pulses b/l. Calf soft, nontender b/l. No pedal edema.      LABS:                        14.0   14.95 )-----------( 282      ( 21 May 2021 11:04 )             41.5     05-21    132<L>  |  98  |  11  ----------------------------<  396<H>  4.5   |  21<L>  |  1.03    Ca    9.2      21 May 2021 11:04    TPro  8.2  /  Alb  3.5  /  TBili  1.0  /  DBili  x   /  AST  15  /  ALT  31  /  AlkPhos  79  05-    LIVER FUNCTIONS - ( 21 May 2021 11:04 )  Alb: 3.5 g/dL / Pro: 8.2 g/dL / ALK PHOS: 79 U/L / ALT: 31 U/L DA / AST: 15 U/L / GGT: x           PT/INR - ( 21 May 2021 11:04 )   PT: 13.3 sec;   INR: 1.12 ratio         PTT - ( 21 May 2021 11:04 )  PTT:27.7 sec  Urinalysis Basic - ( 21 May 2021 12:07 )    Color: Yellow / Appearance: Clear / S.010 / pH: x  Gluc: x / Ketone: Trace  / Bili: Negative / Urobili: Negative   Blood: x / Protein: Negative / Nitrite: Negative   Leuk Esterase: Negative / RBC: x / WBC x   Sq Epi: x / Non Sq Epi: x / Bacteria: x        RADIOLOGY & ADDITIONAL STUDIES:  < from: Xray Hand 3 Views, Right (21 @ 11:22) >  INTERPRETATION:  Right hand. 3 views. Patient has local pain and swelling after injection.    There is mild film IP degeneration and lesser degeneration elsewhere in the digits.    No bone destruction or fracture.    IMPRESSION: No acute finding.      < end of copied text >

## 2021-05-21 NOTE — H&P ADULT - PROBLEM SELECTOR PLAN 3
p/w /116   pt states he is not on any bp meds at home   Patient reported his doctor prescribed him lopressor 50mg 3 weeks ago but he hasn't been taking it.  will start on losartan for now  monitor BP  control pain

## 2021-05-21 NOTE — H&P ADULT - PROBLEM SELECTOR PLAN 5
[ ] Previous VTE                                    3  [ ] Thrombophilia                                  2  [ ] Lower limb paralysis                        2  (unable to hold up >15 seconds)    [ ] Current Cancer (within 6 months)        2   [x] Immobilization > 24 hrs                    1  [ ] ICU/CCU stay > 24 hrs                      1  [ ] Age > 60                                         1   lovenox for ppx

## 2021-05-21 NOTE — CONSULT NOTE ADULT - SUBJECTIVE AND OBJECTIVE BOX
Patient is a 55y old  Male who presents with a chief complaint of hand pain (21 May 2021 15:57)        REVIEW OF SYSTEMS: Total of twelve systems have been reviewed with patient and found to be negative unless mentioned in HPI      PAST MEDICAL & SURGICAL HISTORY:  Diabetes mellitus  type II  Hyperuricemia  Dyslipidemia  Anal fistula  Sprain, bicep  Hx of appendectomy,   Anal fistula  Ananl fistula repair 2013    Tendon tear, ankle, left, sequela, 2016        SOCIAL HISTORY  Alcohol: Does not drink  Tobacco: Does not smoke  Illicit substance use: None      FAMILY HISTORY: Non contributory to the present illness        ALLERGIES: Vasotec (Hives)      Vital Signs Last 24 Hrs  T(C): 37.3 (21 May 2021 18:02), Max: 37.4 (21 May 2021 12:35)  T(F): 99.2 (21 May 2021 18:02), Max: 99.3 (21 May 2021 12:35)  HR: 100 (21 May 2021 18:02) (94 - 104)  BP: 168/110 (21 May 2021 18:02) (167/97 - 203/116)  BP(mean): --  RR: 20 (21 May 2021 18:02) (17 - 20)  SpO2: 100% (21 May 2021 18:02) (100% - 100%)      PHYSICAL EXAM:  GENERAL: Not in distress   CHEST/LUNG:  Aire ntry bilaterally  HEART: s1 and s2 present  ABDOMEN:  Nontender and  Nondistended  EXTREMITIES: No pedal  edema  CNS: Awake and Alert      LABS:                        14.0   14.95 )-----------( 282      ( 21 May 2021 11:04 )             41.5       05-    132<L>  |  98  |  11  ----------------------------<  396<H>  4.5   |  21<L>  |  1.03    Ca    9.2      21 May 2021 11:04    TPro  8.2  /  Alb  3.5  /  TBili  1.0  /  DBili  x   /  AST  15  /  ALT  31  /  AlkPhos  79  05-21    PT/INR - ( 21 May 2021 11:04 )   PT: 13.3 sec;   INR: 1.12 ratio      PTT - ( 21 May 2021 11:04 )  PTT:27.7 sec      CAPILLARY BLOOD GLUCOSE  POCT Blood Glucose.: 245 mg/dL (21 May 2021 18:10)  POCT Blood Glucose.: 283 mg/dL (21 May 2021 13:47)        Urinalysis Basic - ( 21 May 2021 12:07 )  Color: Yellow / Appearance: Clear / S.010 / pH: x  Gluc: x / Ketone: Trace  / Bili: Negative / Urobili: Negative   Blood: x / Protein: Negative / Nitrite: Negative   Leuk Esterase: Negative / RBC: x / WBC x   Sq Epi: x / Non Sq Epi: x / Bacteria: x        MEDICATIONS  (STANDING):  losartan 50 milliGRAM(s) Oral daily    MEDICATIONS  (PRN):  oxycodone    5 mG/acetaminophen 325 mG 1 Tablet(s) Oral every 6 hours PRN Severe Pain (7 - 10)      RADIOLOGY & ADDITIONAL TESTS:    < from: Xray Hand 3 Views, Right (21 @ 11:22) >  INTERPRETATION:  Right hand. 3 views. Patient has local pain and swelling after injection.    There is mild film IP degeneration and lesser degeneration elsewhere in the digits.    No bone destruction or fracture.        MICROBIOLOGY DATA:    COVID-19 PCR . (21 @ 12:07)   COVID-19 PCR: NotDetec:              Patient is a 55y old  Male with PMH of DM and tendon rupture, now presents to the ER for evaluation of right hand pain. He had surgery on his right hand 2 weeks ago at [facility] for Dupuytren contracture. His doctor told him that the stitches would fall off by Monday but they did not and patient went to his Surgeon's office on Wed to get stitches removed. Also reports hand being inflamed . According to him, he got local anesthesia  and the doctor hit his ? nerve. He has severe pain in his right hand since then along with swelling. His doctor was not working so he came to the ER due to pain. He took Motrin, Tylenol, Advil with no relief. He has no fever, but chills and nausea. Reports pain extending from hand all the way to the shoulders, has difficulty moving his hand. Patient plays cricket and has h/o tendon rupture. ON admission, he has no fever but Tachycardia and Leukocytosis. The XRay of right hand has not revealing. He has started on Unasyn, and the ID consult requested to assist with further evaluation and antibiotic management.       REVIEW OF SYSTEMS: Total of twelve systems have been reviewed with patient and found to be negative unless mentioned in HPI      PAST MEDICAL & SURGICAL HISTORY:  Diabetes mellitus type II  Hyperuricemia  Dyslipidemia  Sprain, bicep  Hx of appendectomy,   Anal fistula repair 2013  Tendon tear, ankle, left, sequela, 2016        SOCIAL HISTORY  Alcohol: Does not drink  Tobacco: Does not smoke  Illicit substance use: None      FAMILY HISTORY: Non contributory to the present illness        ALLERGIES: Vasotec (Hives)      Vital Signs Last 24 Hrs  T(C): 37.3 (21 May 2021 18:02), Max: 37.4 (21 May 2021 12:35)  T(F): 99.2 (21 May 2021 18:02), Max: 99.3 (21 May 2021 12:35)  HR: 100 (21 May 2021 18:02) (94 - 104)  BP: 168/110 (21 May 2021 18:02) (167/97 - 203/116)  BP(mean): --  RR: 20 (21 May 2021 18:02) (17 - 20)  SpO2: 100% (21 May 2021 18:02) (100% - 100%)      PHYSICAL EXAM:  GENERAL: Not in distress   CHEST/LUNG: Not using accessory muscles   HEART: s1 and s2 present  ABDOMEN:  Nontender and  Nondistended  EXTREMITIES: Right hand swollen, erythematous palm with a wound and unable to   CNS: Awake and Alert      LABS:                        14.0   14.95 )-----------( 282      ( 21 May 2021 11:04 )             41.5           132<L>  |  98  |  11  ----------------------------<  396<H>  4.5   |  21<L>  |  1.03    Ca    9.2      21 May 2021 11:04    TPro  8.2  /  Alb  3.5  /  TBili  1.0  /  DBili  x   /  AST  15  /  ALT  31  /  AlkPhos  79      PT/INR - ( 21 May 2021 11:04 )   PT: 13.3 sec;   INR: 1.12 ratio      PTT - ( 21 May 2021 11:04 )  PTT:27.7 sec      CAPILLARY BLOOD GLUCOSE  POCT Blood Glucose.: 245 mg/dL (21 May 2021 18:10)  POCT Blood Glucose.: 283 mg/dL (21 May 2021 13:47)        Urinalysis Basic - ( 21 May 2021 12:07 )  Color: Yellow / Appearance: Clear / S.010 / pH: x  Gluc: x / Ketone: Trace  / Bili: Negative / Urobili: Negative   Blood: x / Protein: Negative / Nitrite: Negative   Leuk Esterase: Negative / RBC: x / WBC x   Sq Epi: x / Non Sq Epi: x / Bacteria: x        MEDICATIONS  (STANDING):  losartan 50 milliGRAM(s) Oral daily    MEDICATIONS  (PRN):  oxycodone    5 mG/acetaminophen 325 mG 1 Tablet(s) Oral every 6 hours PRN Severe Pain (7 - 10)      RADIOLOGY & ADDITIONAL TESTS:    21 : Xray Hand 3 Views, Right (21 @ 11:22) :  Right hand. 3 views. Patient has local pain and swelling after injection.  There is mild film IP degeneration and lesser degeneration elsewhere in the digits. No bone destruction or fracture.        MICROBIOLOGY DATA:    COVID-19 PCR . (21 @ 12:07)   COVID-19 PCR: NotDetec:

## 2021-05-21 NOTE — H&P ADULT - NSHPPHYSICALEXAM_GEN_ALL_CORE
PHYSICAL EXAM:  GENERAL: NAD, lying in bed comfortably  HEAD:  Atraumatic, Normocephalic  EYES: EOMI, PERRLA, conjunctiva and sclera clear  ENT: Moist mucous membranes  NECK: Supple, No JVD  CHEST/LUNG: Clear to auscultation bilaterally; No rales, rhonchi, wheezing, or rubs. Unlabored respirations  HEART: Regular rate and rhythm; No murmurs, rubs, or gallops  ABDOMEN: Bowel sounds present; Soft, Nontender, Nondistended  EXTREMITIES:  2+ Peripheral Pulses, brisk capillary refill. No clubbing, cyanosis, or edema  NERVOUS SYSTEM:  Alert & Oriented X3, speech clear. No deficits   MSK: FROM all 4 extremities, full and equal strength  SKIN: No rashes or lesions PHYSICAL EXAM:  GENERAL: NAD, lying in bed comfortably  HEAD:  Atraumatic, Normocephalic  EYES: EOMI, PERRLA, conjunctiva and sclera clear  ENT: Moist mucous membranes  NECK: Supple, No JVD  CHEST/LUNG: Clear to auscultation bilaterally; No rales, rhonchi, wheezing, or rubs. Unlabored respirations  HEART: Regular rate and rhythm; No murmurs, rubs, or gallops  ABDOMEN: Bowel sounds present; Soft, Nontender, Nondistended  EXTREMITIES:  right hand stiff, swollen and tender, no erythema or warmth, swelling extending to fingers   NERVOUS SYSTEM:  Alert & Oriented X3, speech clear. No deficits   MSK: FROM all 4 extremities, full and equal strength  SKIN: No rashes or lesions

## 2021-05-21 NOTE — CHART NOTE - NSCHARTNOTEFT_GEN_A_CORE
EVENT: Antibiotic change: Contacted by PGY3 for change in antibiotic coverage for cellulitis and discontinuation of CT hand as requested by Dr. Lunsford (ID). Unasyn changed to vanco 1g q24 for post-operative cellulitis of hand. Change endorsed to covering NP.     OBJECTIVE:  Vital Signs Last 24 Hrs  T(C): 37.6 (21 May 2021 20:39), Max: 37.6 (21 May 2021 20:39)  T(F): 99.7 (21 May 2021 20:39), Max: 99.7 (21 May 2021 20:39)  HR: 105 (21 May 2021 20:39) (94 - 105)  BP: 172/110 (21 May 2021 20:39) (167/97 - 203/116)  BP(mean): --  RR: 20 (21 May 2021 20:39) (17 - 20)  SpO2: 100% (21 May 2021 20:39) (100% - 100%)    FOCUSED PHYSICAL EXAM:    LABS:                        14.0   14.95 )-----------( 282      ( 21 May 2021 11:04 )             41.5     05-21    132<L>  |  98  |  11  ----------------------------<  396<H>  4.5   |  21<L>  |  1.03    Ca    9.2      21 May 2021 11:04    TPro  8.2  /  Alb  3.5  /  TBili  1.0  /  DBili  x   /  AST  15  /  ALT  31  /  AlkPhos  79  05-21      ASSESSMENT:  HPI:  Patient, 55 y old male with PMH of DM and tendon rupture presents to the ED with complaint of right hand pain. Patient reports he had surgery on his right hand 2 weeks ago at [facility] for dupuytren contracture. His doctor told him that the stitches would fall off by Monday but they did not and patient went to his doctor's office on Wed to get stitches removed. Also reports hand being inflammed. According to him, he got local anasthesia and the doctor hit his nerve?as per him. Reports having continuous severe pain in his right hand since then along with swelling. His doctor was not working so he came to the ED due to pain. He took motrin, tylenol, advil with no relief. No fever, cp, sob, vomiting.. Endorses chills and nausea. Reports pain extending from hand all the way to the shoulders, has difficulty moving his hand. Patient plays cricket and has h/o tendon rupture.  (21 May 2021 15:57)      PLAN: Right hand cellulitis likely 2/2 post-operative infection:  -Unasyn changed to Vancomycin 1g q12 as per ID Dr. Lunsford  - CT R hand d/c'd on request   -Primary team to follow-up  -Follow up ID note    FOLLOW UP / RESULT:

## 2021-05-21 NOTE — ED PROVIDER NOTE - CARE PLAN
Principal Discharge DX:	Cellulitis of hand, right   Principal Discharge DX:	Sepsis without acute organ dysfunction, due to unspecified organism  Secondary Diagnosis:	Cellulitis of hand, right

## 2021-05-22 DIAGNOSIS — E78.5 HYPERLIPIDEMIA, UNSPECIFIED: ICD-10-CM

## 2021-05-22 DIAGNOSIS — M79.641 PAIN IN RIGHT HAND: ICD-10-CM

## 2021-05-22 LAB
A1C WITH ESTIMATED AVERAGE GLUCOSE RESULT: 10.6 % — HIGH (ref 4–5.6)
ALBUMIN SERPL ELPH-MCNC: 2.8 G/DL — LOW (ref 3.5–5)
ALP SERPL-CCNC: 64 U/L — SIGNIFICANT CHANGE UP (ref 40–120)
ALT FLD-CCNC: 19 U/L DA — SIGNIFICANT CHANGE UP (ref 10–60)
ANION GAP SERPL CALC-SCNC: 11 MMOL/L — SIGNIFICANT CHANGE UP (ref 5–17)
AST SERPL-CCNC: 5 U/L — LOW (ref 10–40)
BASOPHILS # BLD AUTO: 0.04 K/UL — SIGNIFICANT CHANGE UP (ref 0–0.2)
BASOPHILS NFR BLD AUTO: 0.3 % — SIGNIFICANT CHANGE UP (ref 0–2)
BILIRUB SERPL-MCNC: 0.8 MG/DL — SIGNIFICANT CHANGE UP (ref 0.2–1.2)
BUN SERPL-MCNC: 7 MG/DL — SIGNIFICANT CHANGE UP (ref 7–18)
CALCIUM SERPL-MCNC: 8.6 MG/DL — SIGNIFICANT CHANGE UP (ref 8.4–10.5)
CHLORIDE SERPL-SCNC: 99 MMOL/L — SIGNIFICANT CHANGE UP (ref 96–108)
CHOLEST SERPL-MCNC: 192 MG/DL — SIGNIFICANT CHANGE UP
CO2 SERPL-SCNC: 24 MMOL/L — SIGNIFICANT CHANGE UP (ref 22–31)
COVID-19 SPIKE DOMAIN AB INTERP: POSITIVE
COVID-19 SPIKE DOMAIN ANTIBODY RESULT: >250 U/ML — HIGH
CREAT SERPL-MCNC: 0.76 MG/DL — SIGNIFICANT CHANGE UP (ref 0.5–1.3)
CRP SERPL-MCNC: 158 MG/L — HIGH
CULTURE RESULTS: NO GROWTH — SIGNIFICANT CHANGE UP
EOSINOPHIL # BLD AUTO: 0.04 K/UL — SIGNIFICANT CHANGE UP (ref 0–0.5)
EOSINOPHIL NFR BLD AUTO: 0.3 % — SIGNIFICANT CHANGE UP (ref 0–6)
ERYTHROCYTE [SEDIMENTATION RATE] IN BLOOD: 200 MM/HR — HIGH (ref 0–20)
ESTIMATED AVERAGE GLUCOSE: 258 MG/DL — HIGH (ref 68–114)
FOLATE SERPL-MCNC: >20 NG/ML — SIGNIFICANT CHANGE UP
GLUCOSE BLDC GLUCOMTR-MCNC: 267 MG/DL — HIGH (ref 70–99)
GLUCOSE BLDC GLUCOMTR-MCNC: 272 MG/DL — HIGH (ref 70–99)
GLUCOSE BLDC GLUCOMTR-MCNC: 314 MG/DL — HIGH (ref 70–99)
GLUCOSE BLDC GLUCOMTR-MCNC: 381 MG/DL — HIGH (ref 70–99)
GLUCOSE SERPL-MCNC: 297 MG/DL — HIGH (ref 70–99)
HCT VFR BLD CALC: 37.4 % — LOW (ref 39–50)
HDLC SERPL-MCNC: 52 MG/DL — SIGNIFICANT CHANGE UP
HGB BLD-MCNC: 12.3 G/DL — LOW (ref 13–17)
IMM GRANULOCYTES NFR BLD AUTO: 0.5 % — SIGNIFICANT CHANGE UP (ref 0–1.5)
LIPID PNL WITH DIRECT LDL SERPL: 95 MG/DL — SIGNIFICANT CHANGE UP
LYMPHOCYTES # BLD AUTO: 18.7 % — SIGNIFICANT CHANGE UP (ref 13–44)
LYMPHOCYTES # BLD AUTO: 2.14 K/UL — SIGNIFICANT CHANGE UP (ref 1–3.3)
MAGNESIUM SERPL-MCNC: 2 MG/DL — SIGNIFICANT CHANGE UP (ref 1.6–2.6)
MCHC RBC-ENTMCNC: 27 PG — SIGNIFICANT CHANGE UP (ref 27–34)
MCHC RBC-ENTMCNC: 32.9 GM/DL — SIGNIFICANT CHANGE UP (ref 32–36)
MCV RBC AUTO: 82 FL — SIGNIFICANT CHANGE UP (ref 80–100)
MONOCYTES # BLD AUTO: 0.96 K/UL — HIGH (ref 0–0.9)
MONOCYTES NFR BLD AUTO: 8.4 % — SIGNIFICANT CHANGE UP (ref 2–14)
NEUTROPHILS # BLD AUTO: 8.22 K/UL — HIGH (ref 1.8–7.4)
NEUTROPHILS NFR BLD AUTO: 71.8 % — SIGNIFICANT CHANGE UP (ref 43–77)
NON HDL CHOLESTEROL: 140 MG/DL — HIGH
NRBC # BLD: 0 /100 WBCS — SIGNIFICANT CHANGE UP (ref 0–0)
PHOSPHATE SERPL-MCNC: 2.3 MG/DL — LOW (ref 2.5–4.5)
PLATELET # BLD AUTO: 272 K/UL — SIGNIFICANT CHANGE UP (ref 150–400)
POTASSIUM SERPL-MCNC: 3.7 MMOL/L — SIGNIFICANT CHANGE UP (ref 3.5–5.3)
POTASSIUM SERPL-SCNC: 3.7 MMOL/L — SIGNIFICANT CHANGE UP (ref 3.5–5.3)
PROT SERPL-MCNC: 7 G/DL — SIGNIFICANT CHANGE UP (ref 6–8.3)
RBC # BLD: 4.56 M/UL — SIGNIFICANT CHANGE UP (ref 4.2–5.8)
RBC # FLD: 12 % — SIGNIFICANT CHANGE UP (ref 10.3–14.5)
SARS-COV-2 IGG+IGM SERPL QL IA: >250 U/ML — HIGH
SARS-COV-2 IGG+IGM SERPL QL IA: POSITIVE
SODIUM SERPL-SCNC: 134 MMOL/L — LOW (ref 135–145)
SPECIMEN SOURCE: SIGNIFICANT CHANGE UP
TRIGL SERPL-MCNC: 227 MG/DL — HIGH
TSH SERPL-MCNC: 0.92 UU/ML — SIGNIFICANT CHANGE UP (ref 0.34–4.82)
VIT B12 SERPL-MCNC: 431 PG/ML — SIGNIFICANT CHANGE UP (ref 232–1245)
WBC # BLD: 11.46 K/UL — HIGH (ref 3.8–10.5)
WBC # FLD AUTO: 11.46 K/UL — HIGH (ref 3.8–10.5)

## 2021-05-22 PROCEDURE — 93971 EXTREMITY STUDY: CPT | Mod: 26,RT

## 2021-05-22 PROCEDURE — 73201 CT UPPER EXTREMITY W/DYE: CPT | Mod: 26,RT

## 2021-05-22 PROCEDURE — 99221 1ST HOSP IP/OBS SF/LOW 40: CPT

## 2021-05-22 RX ORDER — INSULIN LISPRO 100/ML
VIAL (ML) SUBCUTANEOUS AT BEDTIME
Refills: 0 | Status: DISCONTINUED | OUTPATIENT
Start: 2021-05-22 | End: 2021-05-27

## 2021-05-22 RX ORDER — KETOROLAC TROMETHAMINE 30 MG/ML
30 SYRINGE (ML) INJECTION EVERY 8 HOURS
Refills: 0 | Status: DISCONTINUED | OUTPATIENT
Start: 2021-05-22 | End: 2021-05-24

## 2021-05-22 RX ORDER — SODIUM CHLORIDE 9 MG/ML
1000 INJECTION INTRAMUSCULAR; INTRAVENOUS; SUBCUTANEOUS
Refills: 0 | Status: DISCONTINUED | OUTPATIENT
Start: 2021-05-22 | End: 2021-05-22

## 2021-05-22 RX ORDER — KETOROLAC TROMETHAMINE 30 MG/ML
30 SYRINGE (ML) INJECTION ONCE
Refills: 0 | Status: DISCONTINUED | OUTPATIENT
Start: 2021-05-22 | End: 2021-05-22

## 2021-05-22 RX ORDER — POLYETHYLENE GLYCOL 3350 17 G/17G
17 POWDER, FOR SOLUTION ORAL DAILY
Refills: 0 | Status: DISCONTINUED | OUTPATIENT
Start: 2021-05-22 | End: 2021-05-27

## 2021-05-22 RX ORDER — HYDRALAZINE HCL 50 MG
25 TABLET ORAL ONCE
Refills: 0 | Status: COMPLETED | OUTPATIENT
Start: 2021-05-22 | End: 2021-05-22

## 2021-05-22 RX ORDER — INSULIN LISPRO 100/ML
VIAL (ML) SUBCUTANEOUS
Refills: 0 | Status: DISCONTINUED | OUTPATIENT
Start: 2021-05-22 | End: 2021-05-27

## 2021-05-22 RX ORDER — AMPICILLIN SODIUM AND SULBACTAM SODIUM 250; 125 MG/ML; MG/ML
1.5 INJECTION, POWDER, FOR SUSPENSION INTRAMUSCULAR; INTRAVENOUS EVERY 6 HOURS
Refills: 0 | Status: DISCONTINUED | OUTPATIENT
Start: 2021-05-22 | End: 2021-05-22

## 2021-05-22 RX ORDER — CEFEPIME 1 G/1
1000 INJECTION, POWDER, FOR SOLUTION INTRAMUSCULAR; INTRAVENOUS EVERY 8 HOURS
Refills: 0 | Status: DISCONTINUED | OUTPATIENT
Start: 2021-05-22 | End: 2021-05-26

## 2021-05-22 RX ORDER — SENNA PLUS 8.6 MG/1
2 TABLET ORAL AT BEDTIME
Refills: 0 | Status: DISCONTINUED | OUTPATIENT
Start: 2021-05-22 | End: 2021-05-27

## 2021-05-22 RX ORDER — CEFEPIME 1 G/1
INJECTION, POWDER, FOR SOLUTION INTRAMUSCULAR; INTRAVENOUS
Refills: 0 | Status: DISCONTINUED | OUTPATIENT
Start: 2021-05-22 | End: 2021-05-26

## 2021-05-22 RX ORDER — CEFEPIME 1 G/1
1000 INJECTION, POWDER, FOR SOLUTION INTRAMUSCULAR; INTRAVENOUS ONCE
Refills: 0 | Status: COMPLETED | OUTPATIENT
Start: 2021-05-22 | End: 2021-05-22

## 2021-05-22 RX ORDER — OXYCODONE AND ACETAMINOPHEN 5; 325 MG/1; MG/1
1 TABLET ORAL EVERY 4 HOURS
Refills: 0 | Status: DISCONTINUED | OUTPATIENT
Start: 2021-05-22 | End: 2021-05-25

## 2021-05-22 RX ORDER — BACITRACIN ZINC 500 UNIT/G
1 OINTMENT IN PACKET (EA) TOPICAL THREE TIMES A DAY
Refills: 0 | Status: DISCONTINUED | OUTPATIENT
Start: 2021-05-22 | End: 2021-05-27

## 2021-05-22 RX ADMIN — OXYCODONE AND ACETAMINOPHEN 2 TABLET(S): 5; 325 TABLET ORAL at 15:10

## 2021-05-22 RX ADMIN — OXYCODONE AND ACETAMINOPHEN 2 TABLET(S): 5; 325 TABLET ORAL at 06:09

## 2021-05-22 RX ADMIN — Medication 5: at 16:47

## 2021-05-22 RX ADMIN — LOSARTAN POTASSIUM 50 MILLIGRAM(S): 100 TABLET, FILM COATED ORAL at 05:54

## 2021-05-22 RX ADMIN — CEFEPIME 100 MILLIGRAM(S): 1 INJECTION, POWDER, FOR SOLUTION INTRAMUSCULAR; INTRAVENOUS at 22:15

## 2021-05-22 RX ADMIN — Medication 250 MILLIGRAM(S): at 17:57

## 2021-05-22 RX ADMIN — Medication 25 MILLIGRAM(S): at 14:57

## 2021-05-22 RX ADMIN — Medication 30 MILLIGRAM(S): at 18:39

## 2021-05-22 RX ADMIN — CEFEPIME 100 MILLIGRAM(S): 1 INJECTION, POWDER, FOR SOLUTION INTRAMUSCULAR; INTRAVENOUS at 02:00

## 2021-05-22 RX ADMIN — Medication 2: at 22:07

## 2021-05-22 RX ADMIN — Medication 30 MILLIGRAM(S): at 22:50

## 2021-05-22 RX ADMIN — Medication 3: at 12:51

## 2021-05-22 RX ADMIN — OXYCODONE AND ACETAMINOPHEN 2 TABLET(S): 5; 325 TABLET ORAL at 12:00

## 2021-05-22 RX ADMIN — OXYCODONE AND ACETAMINOPHEN 2 TABLET(S): 5; 325 TABLET ORAL at 07:59

## 2021-05-22 RX ADMIN — AMPICILLIN SODIUM AND SULBACTAM SODIUM 100 GRAM(S): 250; 125 INJECTION, POWDER, FOR SUSPENSION INTRAMUSCULAR; INTRAVENOUS at 12:51

## 2021-05-22 RX ADMIN — AMPICILLIN SODIUM AND SULBACTAM SODIUM 100 GRAM(S): 250; 125 INJECTION, POWDER, FOR SUSPENSION INTRAMUSCULAR; INTRAVENOUS at 05:54

## 2021-05-22 RX ADMIN — INSULIN GLARGINE 5 UNIT(S): 100 INJECTION, SOLUTION SUBCUTANEOUS at 22:07

## 2021-05-22 RX ADMIN — Medication 1 APPLICATION(S): at 22:15

## 2021-05-22 RX ADMIN — OXYCODONE AND ACETAMINOPHEN 2 TABLET(S): 5; 325 TABLET ORAL at 03:00

## 2021-05-22 RX ADMIN — Medication 3: at 07:58

## 2021-05-22 RX ADMIN — OXYCODONE AND ACETAMINOPHEN 2 TABLET(S): 5; 325 TABLET ORAL at 19:44

## 2021-05-22 RX ADMIN — OXYCODONE AND ACETAMINOPHEN 2 TABLET(S): 5; 325 TABLET ORAL at 02:00

## 2021-05-22 RX ADMIN — Medication 250 MILLIGRAM(S): at 05:54

## 2021-05-22 RX ADMIN — CEFEPIME 100 MILLIGRAM(S): 1 INJECTION, POWDER, FOR SOLUTION INTRAMUSCULAR; INTRAVENOUS at 14:57

## 2021-05-22 RX ADMIN — OXYCODONE AND ACETAMINOPHEN 2 TABLET(S): 5; 325 TABLET ORAL at 18:48

## 2021-05-22 RX ADMIN — OXYCODONE AND ACETAMINOPHEN 2 TABLET(S): 5; 325 TABLET ORAL at 14:20

## 2021-05-22 RX ADMIN — CEFEPIME 100 MILLIGRAM(S): 1 INJECTION, POWDER, FOR SOLUTION INTRAMUSCULAR; INTRAVENOUS at 05:54

## 2021-05-22 RX ADMIN — AMPICILLIN SODIUM AND SULBACTAM SODIUM 100 GRAM(S): 250; 125 INJECTION, POWDER, FOR SUSPENSION INTRAMUSCULAR; INTRAVENOUS at 17:56

## 2021-05-22 RX ADMIN — Medication 30 MILLIGRAM(S): at 19:00

## 2021-05-22 RX ADMIN — OXYCODONE AND ACETAMINOPHEN 2 TABLET(S): 5; 325 TABLET ORAL at 10:26

## 2021-05-22 RX ADMIN — Medication 30 MILLIGRAM(S): at 22:13

## 2021-05-22 RX ADMIN — OXYCODONE AND ACETAMINOPHEN 1 TABLET(S): 5; 325 TABLET ORAL at 23:10

## 2021-05-22 RX ADMIN — ENOXAPARIN SODIUM 40 MILLIGRAM(S): 100 INJECTION SUBCUTANEOUS at 14:57

## 2021-05-22 RX ADMIN — OXYCODONE AND ACETAMINOPHEN 1 TABLET(S): 5; 325 TABLET ORAL at 22:14

## 2021-05-22 NOTE — CONSULT NOTE ADULT - PROBLEM SELECTOR RECOMMENDATION 9
Pt with right hand pain which is somatic and neuropathic in nature due to right hand cellulitis s/p removal of stiches from recent tendon repair.    Opioid pain recommendations   - Percocet 5-325mg 1-2 tabs PO q 4 hours PRN moderate/ severe pain. Monitor for sedation/ respiratory depression.   Non-opioid pain recommendations   - Toradol 30mg IVP q 8 hours x 2 days, then recommend transition to PO NSAIDs.   - Pt requesting to hold off on starting Gabapentin.   Bowel Regimen  - Continue Miralax 17G PO daily  - Continue Senna 2 tablets at bedtime for constipation  Mild pain   - Non-pharmacological pain treatment recommendations  - Warm/ Cool packs PRN   - Repositioning extremity, elevation, imagery, relaxation, distraction.  - Physical therapy OOB if no contraindications   Recommendations discussed with primary team and RN

## 2021-05-22 NOTE — PROGRESS NOTE ADULT - SUBJECTIVE AND OBJECTIVE BOX
Patient is seen and examined at the bed side, is afebrile.      REVIEW OF SYSTEMS: All other review systems are negative      ALLERGIES: Vasotec (Hives)      Vital Signs Last 24 Hrs  T(C): 36.8 (22 May 2021 13:10), Max: 37.6 (21 May 2021 20:39)  T(F): 98.3 (22 May 2021 13:10), Max: 99.7 (21 May 2021 20:39)  HR: 116 (22 May 2021 18:06) (97 - 123)  BP: 109/100 (22 May 2021 18:06) (109/100 - 172/110)  BP(mean): 106 (22 May 2021 13:10) (106 - 106)  RR: 18 (22 May 2021 16:10) (16 - 20)  SpO2: 95% (22 May 2021 16:10) (95% - 100%)      PHYSICAL EXAM:  GENERAL: Not in distress   CHEST/LUNG: Not using accessory muscles   HEART: s1 and s2 present  ABDOMEN:  Nontender and  Nondistended  EXTREMITIES: Right hand swollen, erythematous palm with a wound and unable to   CNS: Awake and Alert      LABS:                        12.3   11.46 )-----------( 272      ( 22 May 2021 07:19 )             37.4                           14.0   14.95 )-----------( 282      ( 21 May 2021 11:04 )             41.5         05-22    134<L>  |  99  |  7   ----------------------------<  297<H>  3.7   |  24  |  0.76    Ca    8.6      22 May 2021 07:19  Phos  2.3       Mg     2.0         TPro  7.0  /  Alb  2.8<L>  /  TBili  0.8  /  DBili  x   /  AST  5<L>  /  ALT  19  /  AlkPhos  64  22    05-21    132<L>  |  98  |  11  ----------------------------<  396<H>  4.5   |  21<L>  |  1.03    Ca    9.2      21 May 2021 11:04    TPro  8.2  /  Alb  3.5  /  TBili  1.0  /  DBili  x   /  AST  15  /  ALT  31  /  AlkPhos  79  -    PT/INR - ( 21 May 2021 11:04 )   PT: 13.3 sec;   INR: 1.12 ratio      PTT - ( 21 May 2021 11:04 )  PTT:27.7 sec      CAPILLARY BLOOD GLUCOSE  POCT Blood Glucose.: 245 mg/dL (21 May 2021 18:10)  POCT Blood Glucose.: 283 mg/dL (21 May 2021 13:47)        Urinalysis Basic - ( 21 May 2021 12:07 )  Color: Yellow / Appearance: Clear / S.010 / pH: x  Gluc: x / Ketone: Trace  / Bili: Negative / Urobili: Negative   Blood: x / Protein: Negative / Nitrite: Negative   Leuk Esterase: Negative / RBC: x / WBC x   Sq Epi: x / Non Sq Epi: x / Bacteria: x        MEDICATIONS  (STANDING):    ampicillin/sulbactam  IVPB 1.5 Gram(s) IV Intermittent every 6 hours  cefepime   IVPB      cefepime   IVPB 1000 milliGRAM(s) IV Intermittent every 8 hours  enoxaparin Injectable 40 milliGRAM(s) SubCutaneous daily  insulin glargine Injectable (LANTUS) 5 Unit(s) SubCutaneous at bedtime  insulin lispro (ADMELOG) corrective regimen sliding scale   SubCutaneous three times a day before meals  ketorolac   Injectable 30 milliGRAM(s) IV Push every 8 hours  ketorolac   Injectable 30 milliGRAM(s) IV Push once  losartan 50 milliGRAM(s) Oral daily  polyethylene glycol 3350 17 Gram(s) Oral daily  senna 2 Tablet(s) Oral at bedtime  vancomycin  IVPB      vancomycin  IVPB 1000 milliGRAM(s) IV Intermittent every 12 hours      RADIOLOGY & ADDITIONAL TESTS:    21 : Xray Hand 3 Views, Right (21 @ 11:22) :  Right hand. 3 views. Patient has local pain and swelling after injection.  There is mild film IP degeneration and lesser degeneration elsewhere in the digits. No bone destruction or fracture.        MICROBIOLOGY DATA:    COVID-19 Ambrocio Domain Antibody (21 @ 11:18)   COVID-19 Ambrocio Domain Antibody Result: >250.00:     COVID-19 PCR . (21 @ 12:07)   COVID-19 PCR: NotDetec:        Patient is seen and examined at the bed side, is afebrile. He is feeling much better. The Right hand swelling and pain is improved significantly. The Leukocytosis is trending down.       REVIEW OF SYSTEMS: All other review systems are negative      ALLERGIES: Vasotec (Hives)      Vital Signs Last 24 Hrs  T(C): 36.8 (22 May 2021 13:10), Max: 37.6 (21 May 2021 20:39)  T(F): 98.3 (22 May 2021 13:10), Max: 99.7 (21 May 2021 20:39)  HR: 116 (22 May 2021 18:06) (97 - 123)  BP: 109/100 (22 May 2021 18:06) (109/100 - 172/110)  BP(mean): 106 (22 May 2021 13:10) (106 - 106)  RR: 18 (22 May 2021 16:10) (16 - 20)  SpO2: 95% (22 May 2021 16:10) (95% - 100%)      PHYSICAL EXAM:  GENERAL: Not in distress   CHEST/LUNG: Not using accessory muscles   HEART: s1 and s2 present  ABDOMEN:  Nontender and  Nondistended  EXTREMITIES: Right hand swollen, erythematous palm with a wound and unable to   CNS: Awake and Alert      LABS:                        12.3   11.46 )-----------( 272      ( 22 May 2021 07:19 )             37.4                           14.0   14.95 )-----------( 282      ( 21 May 2021 11:04 )             41.5             134<L>  |  99  |  7   ----------------------------<  297<H>  3.7   |  24  |  0.76    Ca    8.6      22 May 2021 07:19  Phos  2.3       Mg     2.0         TPro  7.0  /  Alb  2.8<L>  /  TBili  0.8  /  DBili  x   /  AST  5<L>  /  ALT  19  /  AlkPhos  64  -    05-    132<L>  |  98  |  11  ----------------------------<  396<H>  4.5   |  21<L>  |  1.03    Ca    9.2      21 May 2021 11:04    TPro  8.2  /  Alb  3.5  /  TBili  1.0  /  DBili  x   /  AST  15  /  ALT  31  /  AlkPhos  79      PT/INR - ( 21 May 2021 11:04 )   PT: 13.3 sec;   INR: 1.12 ratio      PTT - ( 21 May 2021 11:04 )  PTT:27.7 sec      CAPILLARY BLOOD GLUCOSE  POCT Blood Glucose.: 245 mg/dL (21 May 2021 18:10)  POCT Blood Glucose.: 283 mg/dL (21 May 2021 13:47)      Urinalysis Basic - ( 21 May 2021 12:07 )  Color: Yellow / Appearance: Clear / S.010 / pH: x  Gluc: x / Ketone: Trace  / Bili: Negative / Urobili: Negative   Blood: x / Protein: Negative / Nitrite: Negative   Leuk Esterase: Negative / RBC: x / WBC x   Sq Epi: x / Non Sq Epi: x / Bacteria: x        MEDICATIONS  (STANDING):    ampicillin/sulbactam  IVPB 1.5 Gram(s) IV Intermittent every 6 hours  cefepime   IVPB      cefepime   IVPB 1000 milliGRAM(s) IV Intermittent every 8 hours  enoxaparin Injectable 40 milliGRAM(s) SubCutaneous daily  insulin glargine Injectable (LANTUS) 5 Unit(s) SubCutaneous at bedtime  insulin lispro (ADMELOG) corrective regimen sliding scale   SubCutaneous three times a day before meals  ketorolac   Injectable 30 milliGRAM(s) IV Push every 8 hours  ketorolac   Injectable 30 milliGRAM(s) IV Push once  losartan 50 milliGRAM(s) Oral daily  polyethylene glycol 3350 17 Gram(s) Oral daily  senna 2 Tablet(s) Oral at bedtime  vancomycin  IVPB      vancomycin  IVPB 1000 milliGRAM(s) IV Intermittent every 12 hours      RADIOLOGY & ADDITIONAL TESTS:    21 : Xray Hand 3 Views, Right (21 @ 11:22) :  Right hand. 3 views. Patient has local pain and swelling after injection.  There is mild film IP degeneration and lesser degeneration elsewhere in the digits. No bone destruction or fracture.        MICROBIOLOGY DATA:    COVID-19 Ambrocio Domain Antibody (21 @ 11:18)   COVID-19 Ambrocio Domain Antibody Result: >250.00:     COVID-19 PCR . (21 @ 12:07)   COVID-19 PCR: NotDetec:

## 2021-05-22 NOTE — CONSULT NOTE ADULT - ASSESSMENT
55 y old RHD male with PMH of DM (poorly controlled), Dupuytrens disease s/p release 2 weeks ago who presented to the ED with Right hand cellulitis s/p suture removal/? injection at the office by his Surgeon past Wednesday    - No acute surgical intervention needed at the moment  - strict R hand elevation  - cont hand ROM exercises  - IV abx as per ID  - strict glycemic control  - recommend contacting his Plastic Surgeon again regarding patient's admission  - plan discussed with primary team  - will follow

## 2021-05-22 NOTE — PROGRESS NOTE ADULT - SUBJECTIVE AND OBJECTIVE BOX
Date of Service  : 21     INTERVAL HPI/OVERNIGHT EVENTS: My hand swelling better .   Vital Signs Last 24 Hrs  T(C): 36.8 (22 May 2021 13:10), Max: 37.6 (21 May 2021 20:39)  T(F): 98.3 (22 May 2021 13:10), Max: 99.7 (21 May 2021 20:39)  HR: 123 (22 May 2021 16:10) (97 - 123)  BP: 151/102 (22 May 2021 16:10) (151/90 - 172/110)  BP(mean): 106 (22 May 2021 13:10) (106 - 106)  RR: 18 (22 May 2021 16:10) (16 - 20)  SpO2: 95% (22 May 2021 16:10) (95% - 100%)  I&O's Summary    MEDICATIONS  (STANDING):  ampicillin/sulbactam  IVPB 1.5 Gram(s) IV Intermittent every 6 hours  cefepime   IVPB      cefepime   IVPB 1000 milliGRAM(s) IV Intermittent every 8 hours  enoxaparin Injectable 40 milliGRAM(s) SubCutaneous daily  insulin glargine Injectable (LANTUS) 5 Unit(s) SubCutaneous at bedtime  insulin lispro (ADMELOG) corrective regimen sliding scale   SubCutaneous three times a day before meals  ketorolac   Injectable 30 milliGRAM(s) IV Push every 8 hours  losartan 50 milliGRAM(s) Oral daily  polyethylene glycol 3350 17 Gram(s) Oral daily  senna 2 Tablet(s) Oral at bedtime  vancomycin  IVPB      vancomycin  IVPB 1000 milliGRAM(s) IV Intermittent every 12 hours    MEDICATIONS  (PRN):  oxycodone    5 mG/acetaminophen 325 mG 2 Tablet(s) Oral every 4 hours PRN Severe Pain (7 - 10)  oxycodone    5 mG/acetaminophen 325 mG 1 Tablet(s) Oral every 4 hours PRN Moderate Pain (4 - 6)    LABS:                        12.3   11.46 )-----------( 272      ( 22 May 2021 07:19 )             37.4         134<L>  |  99  |  7   ----------------------------<  297<H>  3.7   |  24  |  0.76    Ca    8.6      22 May 2021 07:19  Phos  2.3       Mg     2.0         TPro  7.0  /  Alb  2.8<L>  /  TBili  0.8  /  DBili  x   /  AST  5<L>  /  ALT  19  /  AlkPhos  64  -    PT/INR - ( 21 May 2021 11:04 )   PT: 13.3 sec;   INR: 1.12 ratio         PTT - ( 21 May 2021 11:04 )  PTT:27.7 sec  Urinalysis Basic - ( 21 May 2021 12:07 )    Color: Yellow / Appearance: Clear / S.010 / pH: x  Gluc: x / Ketone: Trace  / Bili: Negative / Urobili: Negative   Blood: x / Protein: Negative / Nitrite: Negative   Leuk Esterase: Negative / RBC: x / WBC x   Sq Epi: x / Non Sq Epi: x / Bacteria: x      CAPILLARY BLOOD GLUCOSE      POCT Blood Glucose.: 381 mg/dL (22 May 2021 16:33)  POCT Blood Glucose.: 267 mg/dL (22 May 2021 12:28)  POCT Blood Glucose.: 272 mg/dL (22 May 2021 07:42)  POCT Blood Glucose.: 347 mg/dL (21 May 2021 20:59)  POCT Blood Glucose.: 245 mg/dL (21 May 2021 18:10)        Urinalysis Basic - ( 21 May 2021 12:07 )    Color: Yellow / Appearance: Clear / S.010 / pH: x  Gluc: x / Ketone: Trace  / Bili: Negative / Urobili: Negative   Blood: x / Protein: Negative / Nitrite: Negative   Leuk Esterase: Negative / RBC: x / WBC x   Sq Epi: x / Non Sq Epi: x / Bacteria: x      REVIEW OF SYSTEMS:  CONSTITUTIONAL: No fever, weight loss, or fatigue  EYES: No eye pain, visual disturbances, or discharge  ENMT:  No difficulty hearing, tinnitus, vertigo; No sinus or throat pain  NECK: No pain or stiffness  RESPIRATORY: No cough, wheezing, chills or hemoptysis; No shortness of breath  CARDIOVASCULAR: No chest pain, palpitations, dizziness, or leg swelling  GASTROINTESTINAL: No abdominal or epigastric pain. No nausea, vomiting, or hematemesis; No diarrhea or constipation. No melena or hematochezia.  GENITOURINARY: No dysuria, frequency, hematuria, or incontinence  NEUROLOGICAL: No headaches, memory loss, loss of strength, numbness, or tremors      Consultant(s) Notes Reviewed:  [x ] YES  [ ] NO    PHYSICAL EXAM:  GENERAL: NAD, well-groomed, well-developed,not in any distress ,  HEAD:  Atraumatic, Normocephalic  EYES: EOMI, PERRLA, conjunctiva and sclera clear  ENMT: No tonsillar erythema, exudates, or enlargement; Moist mucous membranes, Good dentition, No lesions  NECK: Supple, No JVD, Normal thyroid  NERVOUS SYSTEM:  Alert & Oriented X3, No focal deficit   CHEST/LUNG: Good air entry bilateral with no  rales, rhonchi, wheezing, or rubs  HEART: Regular rate and rhythm; No murmurs, rubs, or gallops  ABDOMEN: Soft, Nontender, Nondistended; Bowel sounds present  EXTREMITIES:  2+ Peripheral Pulses, No clubbing, cyanosis, or edema  Right hand swelling + ,tender and sensitive to touch ,pulses okay.     Care Discussed with Consultants/Other Providers [ x] YES  [ ] NO

## 2021-05-22 NOTE — PROGRESS NOTE ADULT - ASSESSMENT
Patient is a 55y old  Male with PMH of DM and tendon rupture, now presents to the ER for evaluation of right hand pain. He had surgery on his right hand 2 weeks ago at [facility] for Dupuytren contracture. His doctor told him that the stitches would fall off by Monday but they did not and patient went to his Surgeon's office on Wed to get stitches removed. Also reports hand being inflamed . According to him, he got local anesthesia  and the doctor hit his ? nerve. He has severe pain in his right hand since then along with swelling. His doctor was not working so he came to the ER due to pain. He took Motrin, Tylenol, Advil with no relief. He has no fever, but chills and nausea. Reports pain extending from hand all the way to the shoulders, has difficulty moving his hand. Patient plays cricket and has h/o tendon rupture. ON admission, he has no fever but Tachycardia and Leukocytosis. The XRay of right hand has not revealing. He has started on Unasyn, and the ID consult requested to assist with further evaluation and antibiotic management.     # Sepsis ( Tachycardia + Leukocytosis )  # Right hand post-op wound infection    would recommend:    1. Follow up Blood cultures, is in process  2. Continue Cefepime and Vancomycin until work up is done  3. Please optimize pain management as needed  4. Monitor WBC count, is trending down  5. Local wound care    Attending Attestation:    Spent more than 45 minutes on total encounter, more than 50 % of the visit was spent counseling and/or coordinating care by the Attending physician.   Patient is a 55y old  Male with PMH of DM and tendon rupture, now presents to the ER for evaluation of right hand pain. He had surgery on his right hand 2 weeks ago at [facility] for Dupuytren contracture. His doctor told him that the stitches would fall off by Monday but they did not and patient went to his Surgeon's office on Wed to get stitches removed. Also reports hand being inflamed . According to him, he got local anesthesia  and the doctor hit his ? nerve. He has severe pain in his right hand since then along with swelling. His doctor was not working so he came to the ER due to pain. He took Motrin, Tylenol, Advil with no relief. He has no fever, but chills and nausea. Reports pain extending from hand all the way to the shoulders, has difficulty moving his hand. Patient plays cricket and has h/o tendon rupture. ON admission, he has no fever but Tachycardia and Leukocytosis. The XRay of right hand has not revealing. He has started on Unasyn, and the ID consult requested to assist with further evaluation and antibiotic management.     # Sepsis ( Tachycardia + Leukocytosis )- resolving   # Right hand post-op wound infection    would recommend:    1. Follow up Blood cultures, is in process  2. Continue Cefepime and Vancomycin until work up is done  3. Pain management as needed  4. Monitor WBC count, is trending down  5. Local wound care    d/w Patient and Nursing staff     Attending Attestation:    Spent more than 45 minutes on total encounter, more than 50 % of the visit was spent counseling and/or coordinating care by the Attending physician.

## 2021-05-22 NOTE — CONSULT NOTE ADULT - SUBJECTIVE AND OBJECTIVE BOX
Source of information: MILAD ORDAZ, Chart review  Patient language: English  : n/a    HPI:  Patient, 55 y old male with PMH of DM and tendon rupture presents to the ED with complaint of right hand pain. Patient reports he had surgery on his right hand 2 weeks ago at [facility] for dupuytren contracture. His doctor told him that the stitches would fall off by Monday but they did not and patient went to his doctor's office on Wed to get stitches removed. Also reports hand being inflammed. According to him, he got local anasthesia and the doctor hit his nerve?as per him. Reports having continuous severe pain in his right hand since then along with swelling. His doctor was not working so he came to the ED due to pain. He took motrin, tylenol, advil with no relief. No fever, cp, sob, vomiting.. Endorses chills and nausea. Reports pain extending from hand all the way to the shoulders, has difficulty moving his hand. Patient plays cricket and has h/o tendon rupture.  (21 May 2021 15:57)      Patient is a 55y old  Male PMH of DM and tendon rupture presents to the ED with complaint of right hand pain. Patient reports he had surgery on his right hand 2 weeks ago at [facility] for dupuytren contracture. Had stitches removed at doctor's office on Wed when MD "hit his nerve" and experienced sharp shooting pain throughout right arm.  Pt is being treated with right arm cellulitis with IV antibiotics. Doppler  negative for DVT.   Pt seen and examined at bedside. Pt sitting at bed, denies Reports pain score 7-8/10 and tolerable SCALE USED: (1-10 VNRS). Pt describes pain as aching, throbbing, intermittent shooting pain to right shoulder, alleviated by Percocet 10mg, exacerbated by movement and touch. Pt tolerating PO diet. Denies lethargy, nausea, vomiting, constipation, itchiness. Reports last BM . Patient stated goal for pain control: to be able to take deep breaths, get out of bed to chair and ambulate with tolerable pain control. States post surgery on his right hand he was taking Tylenol, motrin and advil at home.      PAST MEDICAL & SURGICAL HISTORY:  Diabetes mellitus  type II    Hyperuricemia    Dyslipidemia    Anal fistula    Sprain, bicep    Hx of appendectomy      Anal fistula  Ananl fistula repair 2013    Tendon tear, ankle, left, sequela  2016        FAMILY HISTORY:  Family history of hypertension (Mother)    Family history of diabetic complications (Mother)        Social History:  drinks occasionally  does not smoke or take other drugs (21 May 2021 15:57)   [X ] Denies illicit drug use and smoking    Allergies    Vasotec (Hives)- ithcing throat       MEDICATIONS  (STANDING):  ampicillin/sulbactam  IVPB 1.5 Gram(s) IV Intermittent every 6 hours  cefepime   IVPB      cefepime   IVPB 1000 milliGRAM(s) IV Intermittent every 8 hours  enoxaparin Injectable 40 milliGRAM(s) SubCutaneous daily  insulin glargine Injectable (LANTUS) 5 Unit(s) SubCutaneous at bedtime  insulin lispro (ADMELOG) corrective regimen sliding scale   SubCutaneous three times a day before meals  ketorolac   Injectable 30 milliGRAM(s) IV Push every 8 hours  losartan 50 milliGRAM(s) Oral daily  vancomycin  IVPB      vancomycin  IVPB 1000 milliGRAM(s) IV Intermittent every 12 hours    MEDICATIONS  (PRN):  oxycodone    5 mG/acetaminophen 325 mG 2 Tablet(s) Oral every 4 hours PRN Severe Pain (7 - 10)  oxycodone    5 mG/acetaminophen 325 mG 1 Tablet(s) Oral every 4 hours PRN Moderate Pain (4 - 6)      Vital Signs Last 24 Hrs  T(C): 36.8 (22 May 2021 13:10), Max: 37.6 (21 May 2021 20:39)  T(F): 98.3 (22 May 2021 13:10), Max: 99.7 (21 May 2021 20:39)  HR: 101 (22 May 2021 13:10) (97 - 105)  BP: 151/90 (22 May 2021 13:10) (151/90 - 172/110)  BP(mean): 106 (22 May 2021 13:10) (106 - 106)  RR: 16 (22 May 2021 13:10) (16 - 20)  SpO2: 99% (22 May 2021 13:10) (98% - 100%)    LABS: Reviewed.                          12.3   11.46 )-----------( 272      ( 22 May 2021 07:19 )             37.4     05-22    134<L>  |  99  |  7   ----------------------------<  297<H>  3.7   |  24  |  0.76    Ca    8.6      22 May 2021 07:19  Phos  2.3       Mg     2.0         TPro  7.0  /  Alb  2.8<L>  /  TBili  0.8  /  DBili  x   /  AST  5<L>  /  ALT  19  /  AlkPhos  64      PT/INR - ( 21 May 2021 11:04 )   PT: 13.3 sec;   INR: 1.12 ratio         PTT - ( 21 May 2021 11:04 )  PTT:27.7 sec  LIVER FUNCTIONS - ( 22 May 2021 07:19 )  Alb: 2.8 g/dL / Pro: 7.0 g/dL / ALK PHOS: 64 U/L / ALT: 19 U/L DA / AST: 5 U/L / GGT: x           Urinalysis Basic - ( 21 May 2021 12:07 )    Color: Yellow / Appearance: Clear / S.010 / pH: x  Gluc: x / Ketone: Trace  / Bili: Negative / Urobili: Negative   Blood: x / Protein: Negative / Nitrite: Negative   Leuk Esterase: Negative / RBC: x / WBC x   Sq Epi: x / Non Sq Epi: x / Bacteria: x      CAPILLARY BLOOD GLUCOSE      POCT Blood Glucose.: 267 mg/dL (22 May 2021 12:28)  POCT Blood Glucose.: 272 mg/dL (22 May 2021 07:42)  POCT Blood Glucose.: 347 mg/dL (21 May 2021 20:59)  POCT Blood Glucose.: 245 mg/dL (21 May 2021 18:10)    COVID-19 PCR: NotDetec (21 May 2021 12:07)      Radiology: Reviewed.   < from: CT Hand w/ IV Cont, Right (21 @ 12:09) >    EXAM:  CT HAND ONLY IC RT                            PROCEDURE DATE:  2021          INTERPRETATION:  EXAMINATION:CT HAND WITH IV CONTRAST RIGHT    CLINICAL INDICATION: History of diabetes status post tendon repair 2 weeks ago, now with hand pain and swelling. Evaluate for compartment syndrome.    COMPARISON: Radiographs of the right hand dated 2021    TECHNIQUE: CT of the right hand was performed intravenous contrast: 90 mL Omnipaque 350.    INTERPRETATION:    Bones: There is no fracture. The joint spaces are maintained.    Soft Tissues: There is volar hand skin thickening centered at the third digit at the level of the MCP joint with subcutaneous edema and underlying prominence/enlargement up to 1 cm AP of the third digit flexortendon with peripheral enhancement (series 3 image 29) which may be partly the postsurgical appearance however tenosynovitis is suspected which could be infectious or inflammatory/postsurgical. There is surrounding soft tissue edema and phlegmon. There is also diffuse soft tissue edema about the hand.    Volar to the distal radius on series 7 image 35 is a localized rim-enhancing fluid collection which may represent a volar ganglion cyst measuring 1.5 cm craniocaudally.    IMPRESSION:  1.  Localized volar hand skin thickening at the level of the third digit MCP joint, presumed to be the site of recent surgery. Underlying enlargement and peripheral enhancement associated with the third digit flexor tendon. The finding may be party postsurgical, however tenosynovitis, reactive or infectious, is suspected. There is surrounding soft tissue edema and phlegmon.    MRI of the hand without and with intravenous contrast may provide a more sensitive evaluation of the soft tissues, better defined the extent of infection. This exam is recommended if there are no clinical contraindications.    Compartment syndrome should be evaluated for on a clinical basis.    2.  Volar to the distal radius is a 1.5 cm localized fluid collection, likely representing a volar ganglion cyst given this is at a distance from the surgical site, although abscess would have a similar appearance on CT.                  SHLOMIT A GOLDBERG-STEIN M.D., ATTENDING RADIOLOGIST  This document has been electronically signed. May 22 2021  1:00PM    < end of copied text >    < from: US Duplex Venous Upper Ext Ltd, Right (21 @ 12:50) >  EXAM:  US DPLX UPR EXT VEINS LTD RT                            PROCEDURE DATE:  2021          INTERPRETATION:  CLINICAL INFORMATION: Right arm pain    COMPARISON: None available.    TECHNIQUE: Duplex sonography of the RIGHT UPPER extremity veins with color and spectral Doppler, with and without compression.    FINDINGS:    The right internal jugular, subclavian, axillary and brachial veins are patent and compressible where applicable.  The basilic and cephalic veins (superficial veins) are patent and without thrombus.    Doppler examination shows normal spontaneous and phasic flow.    IMPRESSION:  No evidence of right upper extremity deep venous thrombosis.                    KAYDEN NICHOLS M.D., ATTENDING RADIOLOGIST  This document has been electronically signed. May 22 2021 12:55PM    < end of copied text >      ORT Score -   Family Hx of substance abuse	Female	      Male  Alcohol 	                                           1                     3  Illegal drugs	                                   2                     3  Rx drugs                                           4 	                  4  Personal Hx of substance abuse		  Alcohol 	                                          3	                  3  Illegal drugs                                     4	                  4  Rx drugs                                            5 	                  5  Age between 16- 45 years	           1                     1  hx preadolescent sexual abuse	   3 	                  0  Psychological disease		  ADD, OCD, bipolar, schizophrenia   2	          2  Depression                                           1 	          1  Total: 0    a score of 3 or lower indicates low risk for opioid abuse		  a score of 4-7 indicates moderate risk for opioid abuse		  a score of 8 or higher indicates high risk for opioid abuse  	  REVIEW OF SYSTEMS:  CONSTITUTIONAL: No fever or fatigue  RESPIRATORY: No cough, wheezing, chills or hemoptysis; No shortness of breath  CARDIOVASCULAR: No chest pain, palpitations, dizziness, or leg swelling  GASTROINTESTINAL: No loss of appetite, decreased PO intake. No abdominal or epigastric pain. No nausea, vomiting; No diarrhea or constipation.   GENITOURINARY: No dysuria, frequency, hematuria, retention or incontinence  MUSCULOSKELETAL: + right hand and arm pain. + severe right hand swelling; + decreased ROM of right fingers; no upper or lower motor strength weakness, no saddle anesthesia, bowel/bladder incontinence, no falls   NEURO: No headaches, + intermittent numbness/tingling of 3rd digit of right hand, No weakness  PSYCHIATRIC: No depression, anxiety or difficulty sleeping    PHYSICAL EXAM:  GENERAL:  Alert & Oriented X4, cooperative, NAD, Good concentration. Speech is clear.   RESPIRATORY: Respirations even and unlabored. Clear to auscultation bilaterally; No rales, rhonchi, wheezing, or rubs  CARDIOVASCULAR: Normal S1/S2, regular rate and rhythm; No murmurs, rubs, or gallops. No JVD.   GASTROINTESTINAL:  Soft, Nontender, Nondistended; Bowel sounds present  PERIPHERAL VASCULAR: + right hand and mid forearm moderate- severe edema and moderate erythema. 2+ Peripheral Pulses, No cyanosis, No calf tenderness  MUSCULOSKELETAL: Motor Strength 5/5 B/L upper and lower extremities; moves all extremities equally against gravity; ROM right hand and fingers decreased; negative SLR; + right hand tenderness on palpation.   SKIN: + right mid palmar wound open to air + dry yellow, and erythema     Risk factors associated with adverse outcomes related to opioid treatment  [ ]  Concurrent benzodiazepine use  [ ]  History/ Active substance use or alcohol use disorder  [ ] Psychiatric co-morbidity  [ ] Sleep apnea  [ ] COPD  [ ] BMI> 35  [ ] Liver dysfunction  [ ] Renal dysfunction  [ ] CHF  [ ] Smoker  [ ]  Age > 60 years    [X ]  [place]  Reviewed and Copied to Chart. See below.    Plan of care and goal oriented pain management treatment options were discussed with patient and /or primary care giver; all questions and concerns were addressed and care was aligned with patient's wishes.    Educated patient on goal oriented pain management treatment options

## 2021-05-22 NOTE — CONSULT NOTE ADULT - SUBJECTIVE AND OBJECTIVE BOX
HPI:  Patient, 55 y old RHD male with PMH of DM, Dupuytrens disease and tendon rupture presents to the ED with complaint of right hand pain. Patient reports he had surgery on his right hand 2 weeks ago at [facility] for dupuytren contracture. His doctor told him that the stitches would fall off by Monday but they did not and patient went to his doctor's office on Wed to get stitches removed. Also reports hand being inflammed. According to him, he got local anasthesia and the doctor hit his nerve?as per him. Reports having continuous severe pain in his right hand since then along with swelling. His doctor was not working so he came to the ED due to pain. He took motrin, tylenol, advil with no relief. No fever, cp, sob, vomiting.. Endorses chills and nausea. Reports pain extending from hand all the way to the shoulders, has difficulty moving his hand. Patient plays cricket and has h/o tendon rupture.  HPI:  Patient, 55 y old RHD male with PMH of DM, Dupuytrens disease s/p release 2 weeks ago who presented to the ED with complaint of worsening right hand pain and erythema. Patient reports he had surgery on his right hand 2 weeks ago at [facility] by Dr. Jenny Angeles for dupuytren contracture release.  He was then seen at his doctor's office on Wed to get stitches removed, and at that time he had injection into the hand (? local anesthesia), resulting in progressive pain and swelling afterwards.  He was not able to get in touch with his surgeon and presented to ER w/persistent pain not improving with po pain meds.    Denies fever, cp, sob, vomiting. Endorses chills and nausea. Reports pain in right hand digits 1-5, swelling, difficulty fully flexing and extending the fingers.  Of note, Patient plays cricket and has ? h/o tendon rupture s/p repair.     PAST MEDICAL & SURGICAL HISTORY:  Diabetes mellitus  type II    Hyperuricemia    Dyslipidemia    Anal fistula    Sprain, bicep    Hx of appendectomy  1987    Anal fistula  Ananl fistula repair 2013    Tendon tear, ankle, left, sequela  2016        FAMILY HISTORY:  Family history of hypertension (Mother)    Family history of diabetic complications (Mother)        Social History:  drinks occasionally  does not smoke or take other drugs (21 May 2021 15:57)   [X ] Denies illicit drug use and smoking    Allergies    Vasotec (Hives)- ithcing throat     RESULTS:    LABS: Reviewed.                          12.3   11.46 )-----------( 272      ( 22 May 2021 07:19 )             37.4     05-22    134<L>  |  99  |  7   ----------------------------<  297<H>  3.7   |  24  |  0.76    Ca    8.6      22 May 2021 07:19  Phos  2.3     05-22  Mg     2.0     05-22    TPro  7.0  /  Alb  2.8<L>  /  TBili  0.8  /  DBili  x   /  AST  5<L>  /  ALT  19  /  AlkPhos  64  05-22    PT/INR - ( 21 May 2021 11:04 )   PT: 13.3 sec;   INR: 1.12 ratio      CT Hand was reviewed    IMPRESSION:  1.  Localized volar hand skin thickening at the level of the third digit MCP joint, presumed to be the site of recent surgery. Underlying enlargement and peripheral enhancement associated with the third digit flexor tendon. The finding may be party postsurgical, however tenosynovitis, reactive or infectious, is suspected. There is surrounding soft tissue edema and phlegmon.    MRI of the hand without and with intravenous contrast may provide a more sensitive evaluation of the soft tissues, better defined the extent of infection. This exam is recommended if there are no clinical contraindications.    Compartment syndrome should be evaluated for on a clinical basis.    2.  Volar to the distal radius is a 1.5 cm localized fluid collection, likely representing a volar ganglion cyst given this is at a distance from the surgical site, although abscess would have a similar appearance on CT.      R UE duplex: no DVT      EXAM:  XR HAND MIN 3 VIEWS RT                            PROCEDURE DATE:  05/21/2021          INTERPRETATION:  Right hand. 3 views. Patient has local pain and swelling after injection.    There is mild film IP degeneration and lesser degeneration elsewhere in the digits.    No bone destruction or fracture.    IMPRESSION: No acute finding.    EXAM:    AVSS    Gen: awake, alert  Resp: non labored breathing  Right hand: volar palm incision site healing well, no evidence of fluctuance or abscess on exam, TTP over the incisions and digits 1-5, + soft tissue edema and erythema of R hand dorsum extending to the wrist, compartments are soft, flexion and extension of digits 1-5 limited due to pain, hand is warm and well-perfused

## 2021-05-22 NOTE — CHART NOTE - NSCHARTNOTEFT_GEN_A_CORE
Chart and meds reviewed.  Patient seen and examined.    CC:    HPI:          MEDICATIONS  (STANDING):  ampicillin/sulbactam  IVPB 1.5 Gram(s) IV Intermittent every 6 hours  cefepime   IVPB      cefepime   IVPB 1000 milliGRAM(s) IV Intermittent every 8 hours  enoxaparin Injectable 40 milliGRAM(s) SubCutaneous daily  insulin glargine Injectable (LANTUS) 5 Unit(s) SubCutaneous at bedtime  insulin lispro (ADMELOG) corrective regimen sliding scale   SubCutaneous three times a day before meals  losartan 50 milliGRAM(s) Oral daily  vancomycin  IVPB      vancomycin  IVPB 1000 milliGRAM(s) IV Intermittent every 12 hours    MEDICATIONS  (PRN):  oxycodone    5 mG/acetaminophen 325 mG 2 Tablet(s) Oral every 4 hours PRN Severe Pain (7 - 10)      VITALS:  Vital Signs Last 24 Hrs  T(C): 36.8 (22 May 2021 05:35), Max: 37.6 (21 May 2021 20:39)  T(F): 98.2 (22 May 2021 05:35), Max: 99.7 (21 May 2021 20:39)  HR: 103 (22 May 2021 08:07) (94 - 105)  BP: 170/92 (22 May 2021 08:07) (167/97 - 193/108)  BP(mean): --  RR: 18 (22 May 2021 05:35) (18 - 20)  SpO2: 98% (22 May 2021 05:35) (98% - 100%)        PHYSICAL EXAM:    HEENT:  pupils equal and reactive, EOMI, no oropharyngeal lesions, erythema, exudates, oral thrush    NECK:   supple, no carotid bruits, no palpable lymph nodes, no thyromegaly    CV:  +S1, +S2, regular, no murmurs or rubs    RESP:   lungs clear to auscultation bilaterally, no wheezing, rales, rhonchi, good air entry bilaterally    BREAST:  not examined    GI:  abdomen soft, non-tender, non-distended, normal BS, no bruits, no abdominal masses, no palpable masses    RECTAL:  not examined    :  not examined    MSK:   normal muscle tone, no atrophy, no rigidity, no contractions    EXR: R hand     VASCULAR:  pulses equal and symmetric in the upper and lower extremities    NEURO:  AAOX3, no focal neurological deficits, follows all commands, able to move extremities spontaneously    SKIN:  no ulcers, lesions or rashes          LABS:                            12.3   11.46 )-----------( 272      ( 22 May 2021 07:19 )             37.4         134<L>  |  99  |  7   ----------------------------<  297<H>  3.7   |  24  |  0.76    Ca    8.6      22 May 2021 07:19  Phos  2.3       Mg     2.0         TPro  7.0  /  Alb  2.8<L>  /  TBili  0.8  /  DBili  x   /  AST  5<L>  /  ALT  19  /  AlkPhos  64      LIVER FUNCTIONS - ( 22 May 2021 07:19 )  Alb: 2.8 g/dL / Pro: 7.0 g/dL / ALK PHOS: 64 U/L / ALT: 19 U/L DA / AST: 5 U/L / GGT: x           PT/INR - ( 21 May 2021 11:04 )   PT: 13.3 sec;   INR: 1.12 ratio         PTT - ( 21 May 2021 11:04 )  PTT:27.7 sec  Urinalysis Basic - ( 21 May 2021 12:07 )    Color: Yellow / Appearance: Clear / S.010 / pH: x  Gluc: x / Ketone: Trace  / Bili: Negative / Urobili: Negative   Blood: x / Protein: Negative / Nitrite: Negative   Leuk Esterase: Negative / RBC: x / WBC x   Sq Epi: x / Non Sq Epi: x / Bacteria: x    Lactate, Blood: 2.0 mmol/L ( @ 13:03)  Lactate, Blood: 2.4 mmol/L ( @ 11:04)    Blood, Urine: Negative ( @ 12:07) Chart reviewed.  Patient seen and examined.    Pt seen to discussed recommendation by plastic team to transfer to [facility]    ROS: no significant issues overnight, R hand pain 6/10; denies numbness/tingling to hands, no CP/palpitation/SOB/HA/dizziness/abd pain/n/v/d/f/c    MEDICATIONS  (STANDING):  ampicillin/sulbactam  IVPB 1.5 Gram(s) IV Intermittent every 6 hours  cefepime   IVPB      cefepime   IVPB 1000 milliGRAM(s) IV Intermittent every 8 hours  enoxaparin Injectable 40 milliGRAM(s) SubCutaneous daily  insulin glargine Injectable (LANTUS) 5 Unit(s) SubCutaneous at bedtime  insulin lispro (ADMELOG) corrective regimen sliding scale   SubCutaneous three times a day before meals  losartan 50 milliGRAM(s) Oral daily  vancomycin  IVPB      vancomycin  IVPB 1000 milliGRAM(s) IV Intermittent every 12 hours    MEDICATIONS  (PRN):  oxycodone    5 mG/acetaminophen 325 mG 2 Tablet(s) Oral every 4 hours PRN Severe Pain (7 - 10)      VITALS:  Vital Signs Last 24 Hrs  T(C): 36.8 (22 May 2021 05:35), Max: 37.6 (21 May 2021 20:39)  T(F): 98.2 (22 May 2021 05:35), Max: 99.7 (21 May 2021 20:39)  HR: 103 (22 May 2021 08:07) (94 - 105)  BP: 170/92 (22 May 2021 08:07) (167/97 - 193/108)  BP(mean): --  RR: 18 (22 May 2021 05:35) (18 - 20)  SpO2: 98% (22 May 2021 05:35) (98% - 100%)        PHYSICAL EXAM:    HEENT:  pupils equal and reactive, EOMI, no oropharyngeal lesions, erythema, exudates, oral thrush    NECK:   supple, no carotid bruits, no palpable lymph nodes, no thyromegaly    CV:  +S1, +S2, regular, no murmurs or rubs    RESP:   lungs clear to auscultation bilaterally, no wheezing, rales, rhonchi, good air entry bilaterally    BREAST:  not examined    GI:  abdomen soft, non-tender, non-distended, normal BS, no bruits, no abdominal masses, no palpable masses    RECTAL:  not examined    :  voiding well spontaneously    MSK:   normal muscle tone, no atrophy, no rigidity, no contractions    EXR: R hand marked swelling > to radial /thumb area, and +tenderness, middle to hand roughly healing no redness, +distal pulses    VASCULAR:  pulses equal and symmetric in the upper and lower extremities    NEURO:  AAOX3, no focal neurological deficits, follows all commands, able to move extremities spontaneously    SKIN:  no ulcers, lesions or rashes    MEDICATIONS  (STANDING):  ampicillin/sulbactam  IVPB 1.5 Gram(s) IV Intermittent every 6 hours  cefepime   IVPB      cefepime   IVPB 1000 milliGRAM(s) IV Intermittent every 8 hours  enoxaparin Injectable 40 milliGRAM(s) SubCutaneous daily  insulin glargine Injectable (LANTUS) 5 Unit(s) SubCutaneous at bedtime  insulin lispro (ADMELOG) corrective regimen sliding scale   SubCutaneous three times a day before meals  losartan 50 milliGRAM(s) Oral daily  vancomycin  IVPB      vancomycin  IVPB 1000 milliGRAM(s) IV Intermittent every 12 hours    MEDICATIONS  (PRN):        LABS:                        12.3   11.46 )-----------( 272      ( 22 May 2021 07:19 )             37.4     05-22    134<L>  |  99  |  7   ----------------------------<  297<H>  3.7   |  24  |  0.76    Ca    8.6      22 May 2021 07:19  Phos  2.3     -  Mg     2.0     -    TPro  7.0  /  Alb  2.8<L>  /  TBili  0.8  /  DBili  x   /  AST  5<L>  /  ALT  19  /  AlkPhos  64  05-22    LIVER FUNCTIONS - ( 22 May 2021 07:19 )  Alb: 2.8 g/dL / Pro: 7.0 g/dL / ALK PHOS: 64 U/L / ALT: 19 U/L DA / AST: 5 U/L / GGT: x           PT/INR - ( 21 May 2021 11:04 )   PT: 13.3 sec;   INR: 1.12 ratio       PTT - ( 21 May 2021 11:04 )  PTT:27.7 sec  Urinalysis Basic - ( 21 May 2021 12:07 )    Color: Yellow / Appearance: Clear / S.010 / pH: x  Gluc: x / Ketone: Trace  / Bili: Negative / Urobili: Negative   Blood: x / Protein: Negative / Nitrite: Negative   Leuk Esterase: Negative / RBC: x / WBC x   Sq Epi: x / Non Sq Epi: x / Bacteria: x    Lactate, Blood: 2.0 mmol/L ( @ 13:03)  Lactate, Blood: 2.4 mmol/L ( @ 11:04)    Blood, Urine: Negative ( @ 12:07)    55 year old Man with hx of DM , tendon rupture , s/p surgery for dupuytren contracture on  in [facility] had post-discharge f/u visit on "Tuesday" assess and suture removal. presented from home to [facility] ED with complaint worsening R hand/palm pain and swelling since outpt visit with the surgeon's office. On admission he reported pain as sharp, intense, inflamed and increased with movement. He complains that "the doctor" gave him a local anesthesia to complete sutures removal "because couple of them in an area was tender"; he states that at time of injection he felt a "sharp pain radiating to the thumb and all over the hand" and was told by the doctor a nerve was hit (as per pt). He completed the visit and by the following morning his hand was swollen and painful. He reports he made several attempts to contact the surgeon via phone calls and made 1 return visit to the office but "she wasn't there". He presented to [facility]. Pt was seen by plastics with recs to transfer to [facility] given procedure was performed there.      Right Hand and swelling  - seen by plastics  - rec to transfer out to [place]  - d/w patient plastics recs - he is adamant about not going to [facility], he replied "because of them why my hand is like this and I'm not going, if you guys can't treat me then I'll discharge and go to another hospital but not [facility]  - spoke with plastic surgeon Dr. Duran [phone] for other options if any; as per surgeon pt can stay for weekend and treat with IV antiobiotics and f/u oupt post disccharge with "the surgeon"; she will continue to follow pt inpatient   - pt for CT hand today  - he likely will need a hand surgeon at discharge given his refusal to continue care with [facility] surgeon  - con't abx  - pain consult team to see  - d/w Dr Hinds    Poorly controlled HTN  pain likely a factor  he presented with hypertensive urgency  SBP remains 170 on losartan 50mg   will dose with hydralazine 25mg po now if remains high with better pain management, he likely will need increase dosing of losartan, Scr is normal

## 2021-05-22 NOTE — CONSULT NOTE ADULT - ASSESSMENT
Confidential Drug Utilization Report  Search Terms: sugar frances, 1965   Search Date: 05/22/2021 08:52:39 AM   The Drug Utilization Report below displays all of the controlled substance prescriptions, if any, that your patient has filled in the last twelve months. The information displayed on this report is compiled from pharmacy submissions to the Department, and accurately reflects the information as submitted by the pharmacies.  This report was requested by: Cristina Scott | Reference #: [id number]   You have not added a ZAKIA number. Keeping your ZAKIA number(s) up to date on the My ZAKIA # page will enable the separation of your prescriptions from others in the search results.   Others' Prescriptions  Patient Name: Sugar Frances   YOB: 1965   Address: [address] [place] [address]   Sex: Male   Rx Written	Rx Dispensed	Drug	Quantity	Days Supply	Prescriber Name	Prescriber Zakia #	Payment Method	Dispenser  04/30/2021	05/01/2021	acetaminophen-cod #3 tablet 	15	5	[facility]	[id number]	Medicaid	[id number]  * - Drugs marked with an asterisk are compound drugs. If the compound drug is made up of more than one controlled substance, then each controlled substance will be a separate row in the table.

## 2021-05-22 NOTE — PROGRESS NOTE ADULT - ASSESSMENT
55 y old male with PMH of DM and tendon rupture presents to the ED with complaint of right hand pain and swelling.   X-ray hand shows No bone destruction or fracture.     Problem/Plan - 1:  ·  Problem: SIRS (systemic inflammatory response syndrome).  Plan: Resolving.   pt p/w tachycardia and leucocytosis meeting 2 of SIRS criteria  lactate 2.4 now normal  On  vanc and unasyn .  UA negative  likely due to cellulitis  f/u urine and blood cx  ID helping with Antibiotics.      Problem/Plan - 2:  ·  Problem: Cellulitis  of Right hand with ? Abscess .  Plan: pt p/w right hand swelling and pain  start on iv antibiotic  c/w pain control prn   f/u cultures   f/u doppler  ID Dr Lunsford helping with Abxs.   < from: CT Hand w/ IV Cont, Right (05.22.21 @ 12:09) >  IMPRESSION:  1.  Localized volar hand skin thickening at the level of the third digit MCP joint, presumed to be the site of recent surgery. Underlying enlargement and peripheral enhancement associated with the third digit flexor tendon. The finding may be party postsurgical, however tenosynovitis, reactive or infectious, is suspected. There is surrounding soft tissue edema and phlegmon.    MRI of the hand without and with intravenous contrast may provide a more sensitive evaluation of the soft tissues, better defined the extent of infection. This exam is recommended if there are no clinical contraindications.    Compartment syndrome should be evaluated for on a clinical basis.    2.  Volar to the distal radius is a 1.5 cm localized fluid collection, likely representing a volar ganglion cyst given this is at a distance from the surgical site, although abscess would have a similar appearance on CT.    < end of copied text >    D/W Attending plastics Dr. Duran [phone] again and will see pt . Refuses to be transferred to [facility] as not happy with their treatment.      Problem/Plan - 3:  ·  Problem: Hypertensive urgency.  Plan: p/w /116   pt states he is not on any bp meds at home   Patient reported his doctor prescribed him lopressor 50mg 3 weeks ago but he hasn't been taking it.  will start on losartan for now  monitor BP  control pain.      Problem/Plan - 4:  ·  Problem: Diabetes mellitus.  Plan: pt takes metformin at home  start HSS  f/u A1C  monitor fs achs.      Problem/Plan - 5:  ·  Problem: Need for prophylactic measure.  Plan: lovenox for ppx.

## 2021-05-23 LAB
GLUCOSE BLDC GLUCOMTR-MCNC: 229 MG/DL — HIGH (ref 70–99)
GLUCOSE BLDC GLUCOMTR-MCNC: 236 MG/DL — HIGH (ref 70–99)
GLUCOSE BLDC GLUCOMTR-MCNC: 279 MG/DL — HIGH (ref 70–99)
GLUCOSE BLDC GLUCOMTR-MCNC: 379 MG/DL — HIGH (ref 70–99)
VANCOMYCIN TROUGH SERPL-MCNC: 5.7 UG/ML — LOW (ref 10–20)

## 2021-05-23 RX ORDER — VANCOMYCIN HCL 1 G
1250 VIAL (EA) INTRAVENOUS EVERY 12 HOURS
Refills: 0 | Status: DISCONTINUED | OUTPATIENT
Start: 2021-05-24 | End: 2021-05-25

## 2021-05-23 RX ORDER — INSULIN GLARGINE 100 [IU]/ML
30 INJECTION, SOLUTION SUBCUTANEOUS AT BEDTIME
Refills: 0 | Status: DISCONTINUED | OUTPATIENT
Start: 2021-05-23 | End: 2021-05-24

## 2021-05-23 RX ORDER — VANCOMYCIN HCL 1 G
VIAL (EA) INTRAVENOUS
Refills: 0 | Status: DISCONTINUED | OUTPATIENT
Start: 2021-05-23 | End: 2021-05-23

## 2021-05-23 RX ORDER — VANCOMYCIN HCL 1 G
1250 VIAL (EA) INTRAVENOUS ONCE
Refills: 0 | Status: DISCONTINUED | OUTPATIENT
Start: 2021-05-23 | End: 2021-05-23

## 2021-05-23 RX ORDER — INSULIN GLARGINE 100 [IU]/ML
20 INJECTION, SOLUTION SUBCUTANEOUS AT BEDTIME
Refills: 0 | Status: DISCONTINUED | OUTPATIENT
Start: 2021-05-23 | End: 2021-05-23

## 2021-05-23 RX ADMIN — Medication 2: at 17:01

## 2021-05-23 RX ADMIN — OXYCODONE AND ACETAMINOPHEN 1 TABLET(S): 5; 325 TABLET ORAL at 04:35

## 2021-05-23 RX ADMIN — Medication 1 APPLICATION(S): at 21:34

## 2021-05-23 RX ADMIN — Medication 30 MILLIGRAM(S): at 13:39

## 2021-05-23 RX ADMIN — Medication 2: at 11:52

## 2021-05-23 RX ADMIN — Medication 30 MILLIGRAM(S): at 05:34

## 2021-05-23 RX ADMIN — Medication 250 MILLIGRAM(S): at 17:03

## 2021-05-23 RX ADMIN — OXYCODONE AND ACETAMINOPHEN 1 TABLET(S): 5; 325 TABLET ORAL at 13:36

## 2021-05-23 RX ADMIN — CEFEPIME 100 MILLIGRAM(S): 1 INJECTION, POWDER, FOR SOLUTION INTRAMUSCULAR; INTRAVENOUS at 13:37

## 2021-05-23 RX ADMIN — CEFEPIME 100 MILLIGRAM(S): 1 INJECTION, POWDER, FOR SOLUTION INTRAMUSCULAR; INTRAVENOUS at 21:33

## 2021-05-23 RX ADMIN — OXYCODONE AND ACETAMINOPHEN 1 TABLET(S): 5; 325 TABLET ORAL at 11:23

## 2021-05-23 RX ADMIN — INSULIN GLARGINE 30 UNIT(S): 100 INJECTION, SOLUTION SUBCUTANEOUS at 21:33

## 2021-05-23 RX ADMIN — Medication 30 MILLIGRAM(S): at 07:05

## 2021-05-23 RX ADMIN — CEFEPIME 100 MILLIGRAM(S): 1 INJECTION, POWDER, FOR SOLUTION INTRAMUSCULAR; INTRAVENOUS at 05:34

## 2021-05-23 RX ADMIN — LOSARTAN POTASSIUM 50 MILLIGRAM(S): 100 TABLET, FILM COATED ORAL at 05:34

## 2021-05-23 RX ADMIN — Medication 30 MILLIGRAM(S): at 14:30

## 2021-05-23 RX ADMIN — Medication 250 MILLIGRAM(S): at 07:11

## 2021-05-23 RX ADMIN — OXYCODONE AND ACETAMINOPHEN 2 TABLET(S): 5; 325 TABLET ORAL at 19:50

## 2021-05-23 RX ADMIN — Medication 30 MILLIGRAM(S): at 22:10

## 2021-05-23 RX ADMIN — Medication 1 APPLICATION(S): at 05:34

## 2021-05-23 RX ADMIN — OXYCODONE AND ACETAMINOPHEN 2 TABLET(S): 5; 325 TABLET ORAL at 18:56

## 2021-05-23 RX ADMIN — OXYCODONE AND ACETAMINOPHEN 1 TABLET(S): 5; 325 TABLET ORAL at 03:41

## 2021-05-23 RX ADMIN — Medication 30 MILLIGRAM(S): at 21:33

## 2021-05-23 RX ADMIN — Medication 1 APPLICATION(S): at 13:38

## 2021-05-23 RX ADMIN — Medication 3: at 07:58

## 2021-05-23 RX ADMIN — Medication 3: at 21:33

## 2021-05-23 NOTE — PROGRESS NOTE ADULT - SUBJECTIVE AND OBJECTIVE BOX
Patient is seen and examined at the bed side, is afebrile. He is feeling much better. The blood cultures have no growth to date.      REVIEW OF SYSTEMS: All other review systems are negative      ALLERGIES: Vasotec (Hives)      Vital Signs Last 24 Hrs  T(C): 37 (23 May 2021 14:35), Max: 37 (23 May 2021 14:35)  T(F): 98.6 (23 May 2021 14:35), Max: 98.6 (23 May 2021 14:35)  HR: 95 (23 May 2021 14:35) (89 - 116)  BP: 165/98 (23 May 2021 14:35) (148/86 - 169/100)  BP(mean): 114 (23 May 2021 14:35) (114 - 114)  RR: 16 (23 May 2021 14:35) (16 - 18)  SpO2: 98% (23 May 2021 14:35) (93% - 98%)      PHYSICAL EXAM:  GENERAL: Not in distress   CHEST/LUNG: Not using accessory muscles   HEART: s1 and s2 present  ABDOMEN:  Nontender and  Nondistended  EXTREMITIES: Right hand swollen, erythematous palm with a wound and unable to   CNS: Awake and Alert      LABS: No new Labs                        12.3   11.46 )-----------( 272      ( 22 May 2021 07:19 )             37.4                         14.0   14.95 )-----------( 282      ( 21 May 2021 11:04 )             41.5         05-    134<L>  |  99  |  7   ----------------------------<  297<H>  3.7   |  24  |  0.76    Ca    8.6      22 May 2021 07:19  Phos  2.3     05-  Mg     2.0         TPro  7.0  /  Alb  2.8<L>  /  TBili  0.8  /  DBili  x   /  AST  5<L>  /  ALT  19  /  AlkPhos  64  05-22    05-21    132<L>  |  98  |  11  ----------------------------<  396<H>  4.5   |  21<L>  |  1.03    Ca    9.2      21 May 2021 11:04    TPro  8.2  /  Alb  3.5  /  TBili  1.0  /  DBili  x   /  AST  15  /  ALT  31  /  AlkPhos  79      PT/INR - ( 21 May 2021 11:04 )   PT: 13.3 sec;   INR: 1.12 ratio      PTT - ( 21 May 2021 11:04 )  PTT:27.7 sec      CAPILLARY BLOOD GLUCOSE  POCT Blood Glucose.: 245 mg/dL (21 May 2021 18:10)  POCT Blood Glucose.: 283 mg/dL (21 May 2021 13:47)      Urinalysis Basic - ( 21 May 2021 12:07 )  Color: Yellow / Appearance: Clear / S.010 / pH: x  Gluc: x / Ketone: Trace  / Bili: Negative / Urobili: Negative   Blood: x / Protein: Negative / Nitrite: Negative   Leuk Esterase: Negative / RBC: x / WBC x   Sq Epi: x / Non Sq Epi: x / Bacteria: x    Vancomycin Level, Trough (21 @ 05:20)   Vancomycin Level, Trough: 5.7      MEDICATIONS  (STANDING):    BACItracin   Ointment 1 Application(s) Topical three times a day  cefepime   IVPB      cefepime   IVPB 1000 milliGRAM(s) IV Intermittent every 8 hours  enoxaparin Injectable 40 milliGRAM(s) SubCutaneous daily  insulin glargine Injectable (LANTUS) 20 Unit(s) SubCutaneous at bedtime  insulin lispro (ADMELOG) corrective regimen sliding scale   SubCutaneous three times a day before meals  insulin lispro (ADMELOG) corrective regimen sliding scale   SubCutaneous at bedtime  ketorolac   Injectable 30 milliGRAM(s) IV Push every 8 hours  losartan 50 milliGRAM(s) Oral daily  polyethylene glycol 3350 17 Gram(s) Oral daily  senna 2 Tablet(s) Oral at bedtime  vancomycin  IVPB      vancomycin  IVPB 1000 milliGRAM(s) IV Intermittent every 12 hours        RADIOLOGY & ADDITIONAL TESTS:    21 : Xray Hand 3 Views, Right (21 @ 11:22) :  Right hand. 3 views. Patient has local pain and swelling after injection.  There is mild film IP degeneration and lesser degeneration elsewhere in the digits. No bone destruction or fracture.        MICROBIOLOGY DATA:    mCulture - Urine (21 @ 18:21)   Specimen Source: .Urine Clean Catch (Midstream)   Culture Results: No growth     Culture - Blood (21 @ 18:19)   Specimen Source: .Blood Blood-Peripheral   Culture Results: No growth to date.     Culture - Blood (21 @ 18:19)   Specimen Source: .Blood Blood-Peripheral   Culture Results: No growth to date.     COVID-19 Ambrocio Domain Antibody (21 @ 11:18)   COVID-19 Ambrocio Domain Antibody Result: >250.00:     COVID-19 PCR . (21 @ 12:07)   COVID-19 PCR: NotDetec:

## 2021-05-23 NOTE — PROGRESS NOTE ADULT - ASSESSMENT
55 y old male with PMH of DM and tendon rupture presents to the ED with complaint of right hand pain and swelling.   X-ray hand shows No bone destruction or fracture.     Problem/Plan - 1:  ·  Problem: SIRS (systemic inflammatory response syndrome).  Plan: Resolving.   pt p/w tachycardia and leucocytosis meeting 2 of SIRS criteria  lactate 2.4 now normal  On  vanc and unasyn .  UA negative  likely due to cellulitis  f/u urine and blood cx  ID helping with Antibiotics.      Problem/Plan - 2:  ·  Problem: Cellulitis  of Right hand with ? Abscess .  Plan: pt p/w right hand swelling and pain  start on iv antibiotic  c/w pain control prn   f/u cultures   f/u doppler  ID Dr Lunsford helping with Abxs.   < from: CT Hand w/ IV Cont, Right (05.22.21 @ 12:09) >  IMPRESSION:  1.  Localized volar hand skin thickening at the level of the third digit MCP joint, presumed to be the site of recent surgery. Underlying enlargement and peripheral enhancement associated with the third digit flexor tendon. The finding may be party postsurgical, however tenosynovitis, reactive or infectious, is suspected. There is surrounding soft tissue edema and phlegmon.    MRI of the hand without and with intravenous contrast may provide a more sensitive evaluation of the soft tissues, better defined the extent of infection. This exam is recommended if there are no clinical contraindications.    Compartment syndrome should be evaluated for on a clinical basis.    2.  Volar to the distal radius is a 1.5 cm localized fluid collection, likely representing a volar ganglion cyst given this is at a distance from the surgical site, although abscess would have a similar appearance on CT.    < end of copied text >    Plastics helping and no surgical intervention.      Problem/Plan - 3:  ·  Problem: Hypertensive urgency.  Plan: BP readings better.    p/w /116   pt states he is not on any bp meds at home   Patient reported his doctor prescribed him lopressor 50mg 3 weeks ago but he hasn't been taking it.  will start on losartan for now  monitor BP  control pain.      Problem/Plan - 4:  ·  Problem: Diabetes mellitus.  Plan: Sugars high so endo consulted and Lantus increased.    pt takes metformin at home  start HSS  f/u A1C  monitor fs achs.      Problem/Plan - 5:  ·  Problem: Need for prophylactic measure.  Plan: lovenox for ppx.     Disposition : DC  planning pending ID clearance.

## 2021-05-23 NOTE — PROGRESS NOTE ADULT - SUBJECTIVE AND OBJECTIVE BOX
Date of Service  : 05-23-21     INTERVAL HPI/OVERNIGHT EVENTS: I feel much better.   Vital Signs Last 24 Hrs  T(C): 37 (23 May 2021 14:35), Max: 37 (23 May 2021 14:35)  T(F): 98.6 (23 May 2021 14:35), Max: 98.6 (23 May 2021 14:35)  HR: 95 (23 May 2021 14:35) (89 - 95)  BP: 165/98 (23 May 2021 14:35) (148/86 - 165/98)  BP(mean): 114 (23 May 2021 14:35) (114 - 114)  RR: 16 (23 May 2021 14:35) (16 - 18)  SpO2: 98% (23 May 2021 14:35) (93% - 98%)  I&O's Summary    MEDICATIONS  (STANDING):  BACItracin   Ointment 1 Application(s) Topical three times a day  cefepime   IVPB      cefepime   IVPB 1000 milliGRAM(s) IV Intermittent every 8 hours  enoxaparin Injectable 40 milliGRAM(s) SubCutaneous daily  insulin glargine Injectable (LANTUS) 30 Unit(s) SubCutaneous at bedtime  insulin lispro (ADMELOG) corrective regimen sliding scale   SubCutaneous three times a day before meals  insulin lispro (ADMELOG) corrective regimen sliding scale   SubCutaneous at bedtime  ketorolac   Injectable 30 milliGRAM(s) IV Push every 8 hours  losartan 50 milliGRAM(s) Oral daily  polyethylene glycol 3350 17 Gram(s) Oral daily  senna 2 Tablet(s) Oral at bedtime  vancomycin  IVPB 1250 milliGRAM(s) IV Intermittent once  vancomycin  IVPB        MEDICATIONS  (PRN):  oxycodone    5 mG/acetaminophen 325 mG 2 Tablet(s) Oral every 4 hours PRN Severe Pain (7 - 10)  oxycodone    5 mG/acetaminophen 325 mG 1 Tablet(s) Oral every 4 hours PRN Moderate Pain (4 - 6)    LABS:                        12.3   11.46 )-----------( 272      ( 22 May 2021 07:19 )             37.4     05-22    134<L>  |  99  |  7   ----------------------------<  297<H>  3.7   |  24  |  0.76    Ca    8.6      22 May 2021 07:19  Phos  2.3     05-22  Mg     2.0     05-22    TPro  7.0  /  Alb  2.8<L>  /  TBili  0.8  /  DBili  x   /  AST  5<L>  /  ALT  19  /  AlkPhos  64  05-22        CAPILLARY BLOOD GLUCOSE      POCT Blood Glucose.: 229 mg/dL (23 May 2021 16:32)  POCT Blood Glucose.: 236 mg/dL (23 May 2021 11:35)  POCT Blood Glucose.: 279 mg/dL (23 May 2021 07:44)  POCT Blood Glucose.: 314 mg/dL (22 May 2021 21:43)          REVIEW OF SYSTEMS:  CONSTITUTIONAL: No fever, weight loss, or fatigue  EYES: No eye pain, visual disturbances, or discharge  ENMT:  No difficulty hearing, tinnitus, vertigo; No sinus or throat pain  NECK: No pain or stiffness  RESPIRATORY: No cough, wheezing, chills or hemoptysis; No shortness of breath  CARDIOVASCULAR: No chest pain, palpitations, dizziness, or leg swelling  GASTROINTESTINAL: No abdominal or epigastric pain. No nausea, vomiting, or hematemesis; No diarrhea or constipation. No melena or hematochezia.  GENITOURINARY: No dysuria, frequency, hematuria, or incontinence  NEUROLOGICAL: No headaches, memory loss, loss of strength, numbness, or tremors  SKIN: No itching, burning, rashes, or lesions   ENDOCRINE: No heat or cold intolerance; No hair loss  MUSCULOSKELETAL: No joint pain or swelling; No muscle, back, or extremity pain  PSYCHIATRIC: No depression, anxiety, mood swings, or difficulty sleeping  HEME/LYMPH: No easy bruising, or bleeding gums  ALLERY AND IMMUNOLOGIC: No hives or eczema    RADIOLOGY & ADDITIONAL TESTS:    Consultant(s) Notes Reviewed:  [x ] YES  [ ] NO    PHYSICAL EXAM:  GENERAL: NAD, well-groomed, well-developed,not in any distress ,  HEAD:  Atraumatic, Normocephalic  EYES: EOMI, PERRLA, conjunctiva and sclera clear  ENMT: No tonsillar erythema, exudates, or enlargement; Moist mucous membranes, Good dentition, No lesions  NECK: Supple, No JVD, Normal thyroid  NERVOUS SYSTEM:  Alert & Oriented X3, No focal deficit   CHEST/LUNG: Good air entry bilateral with no  rales, rhonchi, wheezing, or rubs  HEART: Regular rate and rhythm; No murmurs, rubs, or gallops  ABDOMEN: Soft, Nontender, Nondistended; Bowel sounds present  EXTREMITIES:  2+ Peripheral Pulses, No clubbing, cyanosis, or edema  RT hand swelling and tenderness  much less     Care Discussed with Consultants/Other Providers [ x] YES  [ ] NO

## 2021-05-23 NOTE — PROGRESS NOTE ADULT - ASSESSMENT
Patient is a 55y old  Male with PMH of DM and tendon rupture, now presents to the ER for evaluation of right hand pain. He had surgery on his right hand 2 weeks ago at [facility] for Dupuytren contracture. His doctor told him that the stitches would fall off by Monday but they did not and patient went to his Surgeon's office on Wed to get stitches removed. Also reports hand being inflamed . According to him, he got local anesthesia  and the doctor hit his ? nerve. He has severe pain in his right hand since then along with swelling. His doctor was not working so he came to the ER due to pain. He took Motrin, Tylenol, Advil with no relief. He has no fever, but chills and nausea. Reports pain extending from hand all the way to the shoulders, has difficulty moving his hand. Patient plays cricket and has h/o tendon rupture. ON admission, he has no fever but Tachycardia and Leukocytosis. The XRay of right hand has not revealing. He has started on Unasyn, and the ID consult requested to assist with further evaluation and antibiotic management.     # Sepsis ( Tachycardia + Leukocytosis )- resolving   # Right hand post-op wound infection    would recommend:    1. Adjust Vancomycin doses to 1250 mg  [id number]  2. Continue Cefepime and Vancomycin until work up is done  3. Pain management as needed  4. Local wound care    d/w Patient and Nursing staff     Attending Attestation:    Spent more than 35 minutes on total encounter, more than 50 % of the visit was spent counseling and/or coordinating care by the Attending physician. Patient is a 55y old  Male with PMH of DM and tendon rupture, now presents to the ER for evaluation of right hand pain. He had surgery on his right hand 2 weeks ago at [facility] for Dupuytren contracture. His doctor told him that the stitches would fall off by Monday but they did not and patient went to his Surgeon's office on Wed to get stitches removed. Also reports hand being inflamed . According to him, he got local anesthesia  and the doctor hit his ? nerve. He has severe pain in his right hand since then along with swelling. His doctor was not working so he came to the ER due to pain. He took Motrin, Tylenol, Advil with no relief. He has no fever, but chills and nausea. Reports pain extending from hand all the way to the shoulders, has difficulty moving his hand. Patient plays cricket and has h/o tendon rupture. ON admission, he has no fever but Tachycardia and Leukocytosis. The XRay of right hand has not revealing. He has started on Unasyn, and the ID consult requested to assist with further evaluation and antibiotic management.     # Sepsis ( Tachycardia + Leukocytosis )- resolved  # Right hand post-op wound infection    would recommend:    1. Adjust Vancomycin doses to 1250 mg  [id number]  2. Continue Cefepime   3. May change to oral doxycycline 100 mg q 12hours and Augmentin 875 mg q 12hours on discharge to continue until 5/29/21  4. Pain management as needed  5. Local wound care    d/w Patient and Nursing staff     Attending Attestation:    Spent more than 35 minutes on total encounter, more than 50 % of the visit was spent counseling and/or coordinating care by the Attending physician.

## 2021-05-23 NOTE — CHART NOTE - NSCHARTNOTEFT_GEN_A_CORE
EVENT:    55 year old male with PMH. DM, rupture tendon, right hand surgery. Patient was admitted with Sepsis 2/3 Cellulitis right hand. He is on Cefepime IV and Vancomycin IV for post-op wound infection.   Patient was assessed at bedside. He denied pain. Right hand swelling - improved. However, he had multiple superficial skin wounds on is right hand.    OBJECTIVE:  Vital Signs Last 24 Hrs  T(C): 36.9 (22 May 2021 21:12), Max: 36.9 (22 May 2021 21:12)  T(F): 98.5 (22 May 2021 21:12), Max: 98.5 (22 May 2021 21:12)  HR: 93 (22 May 2021 21:12) (93 - 123)  BP: 159/92 (22 May 2021 21:12) (151/90 - 171/100)  BP(mean): 106 (22 May 2021 13:10) (106 - 106)  RR: 18 (22 May 2021 21:12) (16 - 18)  SpO2: 96% (22 May 2021 21:12) (95% - 99%)    FOCUSED PHYSICAL EXAM:    LABS:                        12.3   11.46 )-----------( 272      ( 22 May 2021 07:19 )             37.4     05-22    134<L>  |  99  |  7   ----------------------------<  297<H>  3.7   |  24  |  0.76    Ca    8.6      22 May 2021 07:19  Phos  2.3     05-22  Mg     2.0     05-22    TPro  7.0  /  Alb  2.8<L>  /  TBili  0.8  /  DBili  x   /  AST  5<L>  /  ALT  19  /  AlkPhos  64  05-22      EKG:   IMAGING:    ASSESSMENT:  HPI:  Patient, 55 y old male with PMH of DM and tendon rupture presents to the ED with complaint of right hand pain. Patient reports he had surgery on his right hand 2 weeks ago at [facility] for dupuytren contracture. His doctor told him that the stitches would fall off by Monday but they did not and patient went to his doctor's office on Wed to get stitches removed. Also reports hand being inflamed. According to him, he got local anasthesia and the doctor hit his nerve? as per him. Reports having continuous severe pain in his right hand since then along with swelling. His doctor was not working so he came to the ED due to pain. He took Motrin, Tylenol, Advil with no relief. No fever, cp, sob, vomiting.. Endorses chills and nausea. Reports pain extending from hand all the way to the shoulders, has difficulty moving his hand. Patient plays cricket and has h/o tendon rupture.  (21 May 2021 15:57)      PLAN:   - Bacitracin ointment,  topical, for skin infection prevention, TID.     FOLLOW UP / RESULT:   - Continue supportive care,   - Maintain patient safety and comfort.

## 2021-05-24 DIAGNOSIS — Z29.9 ENCOUNTER FOR PROPHYLACTIC MEASURES, UNSPECIFIED: ICD-10-CM

## 2021-05-24 DIAGNOSIS — E11.65 TYPE 2 DIABETES MELLITUS WITH HYPERGLYCEMIA: ICD-10-CM

## 2021-05-24 LAB
ALBUMIN SERPL ELPH-MCNC: 3.1 G/DL — LOW (ref 3.5–5)
ALP SERPL-CCNC: 64 U/L — SIGNIFICANT CHANGE UP (ref 40–120)
ALT FLD-CCNC: 31 U/L DA — SIGNIFICANT CHANGE UP (ref 10–60)
ANION GAP SERPL CALC-SCNC: 10 MMOL/L — SIGNIFICANT CHANGE UP (ref 5–17)
ANION GAP SERPL CALC-SCNC: 6 MMOL/L — SIGNIFICANT CHANGE UP (ref 5–17)
AST SERPL-CCNC: 18 U/L — SIGNIFICANT CHANGE UP (ref 10–40)
BILIRUB SERPL-MCNC: 0.4 MG/DL — SIGNIFICANT CHANGE UP (ref 0.2–1.2)
BUN SERPL-MCNC: 13 MG/DL — SIGNIFICANT CHANGE UP (ref 7–18)
BUN SERPL-MCNC: 13 MG/DL — SIGNIFICANT CHANGE UP (ref 7–18)
CALCIUM SERPL-MCNC: 8.6 MG/DL — SIGNIFICANT CHANGE UP (ref 8.4–10.5)
CALCIUM SERPL-MCNC: 8.7 MG/DL — SIGNIFICANT CHANGE UP (ref 8.4–10.5)
CHLORIDE SERPL-SCNC: 102 MMOL/L — SIGNIFICANT CHANGE UP (ref 96–108)
CHLORIDE SERPL-SCNC: 99 MMOL/L — SIGNIFICANT CHANGE UP (ref 96–108)
CO2 SERPL-SCNC: 23 MMOL/L — SIGNIFICANT CHANGE UP (ref 22–31)
CO2 SERPL-SCNC: 26 MMOL/L — SIGNIFICANT CHANGE UP (ref 22–31)
CREAT SERPL-MCNC: 0.9 MG/DL — SIGNIFICANT CHANGE UP (ref 0.5–1.3)
CREAT SERPL-MCNC: 0.93 MG/DL — SIGNIFICANT CHANGE UP (ref 0.5–1.3)
CRP SERPL-MCNC: 63 MG/L — HIGH
ERYTHROCYTE [SEDIMENTATION RATE] IN BLOOD: 65 MM/HR — HIGH (ref 0–20)
GLUCOSE BLDC GLUCOMTR-MCNC: 195 MG/DL — HIGH (ref 70–99)
GLUCOSE BLDC GLUCOMTR-MCNC: 223 MG/DL — HIGH (ref 70–99)
GLUCOSE BLDC GLUCOMTR-MCNC: 228 MG/DL — HIGH (ref 70–99)
GLUCOSE BLDC GLUCOMTR-MCNC: 267 MG/DL — HIGH (ref 70–99)
GLUCOSE SERPL-MCNC: 264 MG/DL — HIGH (ref 70–99)
GLUCOSE SERPL-MCNC: 401 MG/DL — HIGH (ref 70–99)
HCT VFR BLD CALC: 38.3 % — LOW (ref 39–50)
HCT VFR BLD CALC: 38.5 % — LOW (ref 39–50)
HGB BLD-MCNC: 12.9 G/DL — LOW (ref 13–17)
HGB BLD-MCNC: 12.9 G/DL — LOW (ref 13–17)
LACTATE SERPL-SCNC: 1.3 MMOL/L — SIGNIFICANT CHANGE UP (ref 0.7–2)
MCHC RBC-ENTMCNC: 27.4 PG — SIGNIFICANT CHANGE UP (ref 27–34)
MCHC RBC-ENTMCNC: 27.6 PG — SIGNIFICANT CHANGE UP (ref 27–34)
MCHC RBC-ENTMCNC: 33.5 GM/DL — SIGNIFICANT CHANGE UP (ref 32–36)
MCHC RBC-ENTMCNC: 33.7 GM/DL — SIGNIFICANT CHANGE UP (ref 32–36)
MCV RBC AUTO: 81.5 FL — SIGNIFICANT CHANGE UP (ref 80–100)
MCV RBC AUTO: 82.4 FL — SIGNIFICANT CHANGE UP (ref 80–100)
NRBC # BLD: 0 /100 WBCS — SIGNIFICANT CHANGE UP (ref 0–0)
NRBC # BLD: 0 /100 WBCS — SIGNIFICANT CHANGE UP (ref 0–0)
PLATELET # BLD AUTO: 295 K/UL — SIGNIFICANT CHANGE UP (ref 150–400)
PLATELET # BLD AUTO: 310 K/UL — SIGNIFICANT CHANGE UP (ref 150–400)
POTASSIUM SERPL-MCNC: 3.9 MMOL/L — SIGNIFICANT CHANGE UP (ref 3.5–5.3)
POTASSIUM SERPL-MCNC: 4.1 MMOL/L — SIGNIFICANT CHANGE UP (ref 3.5–5.3)
POTASSIUM SERPL-SCNC: 3.9 MMOL/L — SIGNIFICANT CHANGE UP (ref 3.5–5.3)
POTASSIUM SERPL-SCNC: 4.1 MMOL/L — SIGNIFICANT CHANGE UP (ref 3.5–5.3)
PROT SERPL-MCNC: 7.4 G/DL — SIGNIFICANT CHANGE UP (ref 6–8.3)
RBC # BLD: 4.67 M/UL — SIGNIFICANT CHANGE UP (ref 4.2–5.8)
RBC # BLD: 4.7 M/UL — SIGNIFICANT CHANGE UP (ref 4.2–5.8)
RBC # FLD: 11.9 % — SIGNIFICANT CHANGE UP (ref 10.3–14.5)
RBC # FLD: 12.3 % — SIGNIFICANT CHANGE UP (ref 10.3–14.5)
SODIUM SERPL-SCNC: 132 MMOL/L — LOW (ref 135–145)
SODIUM SERPL-SCNC: 134 MMOL/L — LOW (ref 135–145)
WBC # BLD: 8.03 K/UL — SIGNIFICANT CHANGE UP (ref 3.8–10.5)
WBC # BLD: 8.72 K/UL — SIGNIFICANT CHANGE UP (ref 3.8–10.5)
WBC # FLD AUTO: 8.03 K/UL — SIGNIFICANT CHANGE UP (ref 3.8–10.5)
WBC # FLD AUTO: 8.72 K/UL — SIGNIFICANT CHANGE UP (ref 3.8–10.5)

## 2021-05-24 RX ORDER — METOPROLOL TARTRATE 50 MG
50 TABLET ORAL DAILY
Refills: 0 | Status: DISCONTINUED | OUTPATIENT
Start: 2021-05-24 | End: 2021-05-27

## 2021-05-24 RX ORDER — INSULIN GLARGINE 100 [IU]/ML
35 INJECTION, SOLUTION SUBCUTANEOUS AT BEDTIME
Refills: 0 | Status: DISCONTINUED | OUTPATIENT
Start: 2021-05-24 | End: 2021-05-24

## 2021-05-24 RX ORDER — INSULIN LISPRO 100/ML
4 VIAL (ML) SUBCUTANEOUS
Refills: 0 | Status: DISCONTINUED | OUTPATIENT
Start: 2021-05-24 | End: 2021-05-27

## 2021-05-24 RX ORDER — GABAPENTIN 400 MG/1
300 CAPSULE ORAL ONCE
Refills: 0 | Status: COMPLETED | OUTPATIENT
Start: 2021-05-24 | End: 2021-05-24

## 2021-05-24 RX ORDER — INSULIN GLARGINE 100 [IU]/ML
40 INJECTION, SOLUTION SUBCUTANEOUS AT BEDTIME
Refills: 0 | Status: DISCONTINUED | OUTPATIENT
Start: 2021-05-24 | End: 2021-05-27

## 2021-05-24 RX ORDER — HYDRALAZINE HCL 50 MG
50 TABLET ORAL ONCE
Refills: 0 | Status: COMPLETED | OUTPATIENT
Start: 2021-05-24 | End: 2021-05-24

## 2021-05-24 RX ORDER — LABETALOL HCL 100 MG
5 TABLET ORAL ONCE
Refills: 0 | Status: COMPLETED | OUTPATIENT
Start: 2021-05-24 | End: 2021-05-24

## 2021-05-24 RX ORDER — KETOROLAC TROMETHAMINE 30 MG/ML
15 SYRINGE (ML) INJECTION ONCE
Refills: 0 | Status: DISCONTINUED | OUTPATIENT
Start: 2021-05-24 | End: 2021-05-24

## 2021-05-24 RX ORDER — MORPHINE SULFATE 50 MG/1
2 CAPSULE, EXTENDED RELEASE ORAL EVERY 6 HOURS
Refills: 0 | Status: DISCONTINUED | OUTPATIENT
Start: 2021-05-24 | End: 2021-05-25

## 2021-05-24 RX ADMIN — OXYCODONE AND ACETAMINOPHEN 1 TABLET(S): 5; 325 TABLET ORAL at 18:41

## 2021-05-24 RX ADMIN — OXYCODONE AND ACETAMINOPHEN 2 TABLET(S): 5; 325 TABLET ORAL at 03:01

## 2021-05-24 RX ADMIN — MORPHINE SULFATE 2 MILLIGRAM(S): 50 CAPSULE, EXTENDED RELEASE ORAL at 15:34

## 2021-05-24 RX ADMIN — Medication 5 MILLIGRAM(S): at 17:44

## 2021-05-24 RX ADMIN — Medication 30 MILLIGRAM(S): at 14:17

## 2021-05-24 RX ADMIN — OXYCODONE AND ACETAMINOPHEN 1 TABLET(S): 5; 325 TABLET ORAL at 19:00

## 2021-05-24 RX ADMIN — GABAPENTIN 300 MILLIGRAM(S): 400 CAPSULE ORAL at 21:51

## 2021-05-24 RX ADMIN — LOSARTAN POTASSIUM 50 MILLIGRAM(S): 100 TABLET, FILM COATED ORAL at 05:51

## 2021-05-24 RX ADMIN — OXYCODONE AND ACETAMINOPHEN 1 TABLET(S): 5; 325 TABLET ORAL at 22:21

## 2021-05-24 RX ADMIN — MORPHINE SULFATE 2 MILLIGRAM(S): 50 CAPSULE, EXTENDED RELEASE ORAL at 16:45

## 2021-05-24 RX ADMIN — Medication 3: at 08:07

## 2021-05-24 RX ADMIN — INSULIN GLARGINE 40 UNIT(S): 100 INJECTION, SOLUTION SUBCUTANEOUS at 21:45

## 2021-05-24 RX ADMIN — Medication 1: at 16:54

## 2021-05-24 RX ADMIN — Medication 30 MILLIGRAM(S): at 06:35

## 2021-05-24 RX ADMIN — CEFEPIME 100 MILLIGRAM(S): 1 INJECTION, POWDER, FOR SOLUTION INTRAMUSCULAR; INTRAVENOUS at 21:45

## 2021-05-24 RX ADMIN — Medication 30 MILLIGRAM(S): at 05:51

## 2021-05-24 RX ADMIN — Medication 2: at 11:49

## 2021-05-24 RX ADMIN — OXYCODONE AND ACETAMINOPHEN 2 TABLET(S): 5; 325 TABLET ORAL at 03:55

## 2021-05-24 RX ADMIN — CEFEPIME 100 MILLIGRAM(S): 1 INJECTION, POWDER, FOR SOLUTION INTRAMUSCULAR; INTRAVENOUS at 05:52

## 2021-05-24 RX ADMIN — OXYCODONE AND ACETAMINOPHEN 1 TABLET(S): 5; 325 TABLET ORAL at 21:51

## 2021-05-24 RX ADMIN — Medication 30 MILLIGRAM(S): at 15:20

## 2021-05-24 RX ADMIN — Medication 166.67 MILLIGRAM(S): at 05:52

## 2021-05-24 RX ADMIN — Medication 15 MILLIGRAM(S): at 22:21

## 2021-05-24 RX ADMIN — Medication 1 APPLICATION(S): at 14:21

## 2021-05-24 RX ADMIN — CEFEPIME 100 MILLIGRAM(S): 1 INJECTION, POWDER, FOR SOLUTION INTRAMUSCULAR; INTRAVENOUS at 14:27

## 2021-05-24 RX ADMIN — Medication 1 APPLICATION(S): at 05:52

## 2021-05-24 RX ADMIN — Medication 4 UNIT(S): at 16:54

## 2021-05-24 RX ADMIN — Medication 15 MILLIGRAM(S): at 21:51

## 2021-05-24 RX ADMIN — Medication 1 APPLICATION(S): at 21:51

## 2021-05-24 RX ADMIN — Medication 50 MILLIGRAM(S): at 18:36

## 2021-05-24 RX ADMIN — Medication 166.67 MILLIGRAM(S): at 17:49

## 2021-05-24 NOTE — PROGRESS NOTE ADULT - ASSESSMENT
55 yoM h/o Dupuytrens disease s/p release OSH with postop infection    wound improving, no drainage or fluctuance  recommend continuing antibiotics, f/u ID recs on abx deescalation  no surgical intervention needed, continue local wound care with bacitracin and elevation if swelling, will benefit eval/tx per occupational therapy  pt to f/u with plastic/hand surgeon as outpatient for appropriate follow up

## 2021-05-24 NOTE — PROGRESS NOTE ADULT - ASSESSMENT
55 y old male with PMH of DM and tendon rupture presents to the ED with complaint of right hand pain and swelling.   X-ray hand shows No bone destruction or fracture.

## 2021-05-24 NOTE — CONSULT NOTE ADULT - SUBJECTIVE AND OBJECTIVE BOX
HPI:  Patient, 55 y old male with PMH of DM and tendon rupture presents to the ED with complaint of right hand pain. Patient reports he had surgery on his right hand 2 weeks ago at [facility] for dupuytren contracture. His doctor told him that the stitches would fall off by Monday but they did not and patient went to his doctor's office on Wed to get stitches removed. Also reports hand being inflammed. According to him, he got local anasthesia and the doctor hit his nerve?as per him. Reports having continuous severe pain in his right hand since then along with swelling. His doctor was not working so he came to the ED due to pain. He took motrin, tylenol, advil with no relief. No fever, cp, sob, vomiting.. Endorses chills and nausea. Reports pain extending from hand all the way to the shoulders, has difficulty moving his hand. Patient plays cricket and has h/o tendon rupture.  (21 May 2021 15:57)    ENDO:   Endo consulted for uncontrolled DM. A1c: 10.6. Reports non compliance with metformin and that he was trying to control his blood glucose with dietary compliance. Pt reluctant to starting insulin at this point. Pt educated about diabetes and about importance of insulin for better glucose control and also that it will help in quicker healing of his hand infection. pt agreed for long acting insulin at bedtime.       PAST MEDICAL & SURGICAL HISTORY:  Diabetes mellitus  type II    Hyperuricemia    Dyslipidemia    Anal fistula    Sprain, bicep    Hx of appendectomy  1987    Anal fistula  Ananl fistula repair 2013    Tendon tear, ankle, left, sequela  2016        FAMILY HISTORY:  Family history of hypertension (Mother)    Family history of diabetic complications (Mother)            Outpatient Medications:    MEDICATIONS  (STANDING):  BACItracin   Ointment 1 Application(s) Topical three times a day  cefepime   IVPB      cefepime   IVPB 1000 milliGRAM(s) IV Intermittent every 8 hours  enoxaparin Injectable 40 milliGRAM(s) SubCutaneous daily  insulin glargine Injectable (LANTUS) 35 Unit(s) SubCutaneous at bedtime  insulin lispro (ADMELOG) corrective regimen sliding scale   SubCutaneous at bedtime  insulin lispro (ADMELOG) corrective regimen sliding scale   SubCutaneous three times a day before meals  insulin lispro Injectable (ADMELOG) 4 Unit(s) SubCutaneous three times a day before meals  ketorolac   Injectable 30 milliGRAM(s) IV Push every 8 hours  losartan 50 milliGRAM(s) Oral daily  polyethylene glycol 3350 17 Gram(s) Oral daily  senna 2 Tablet(s) Oral at bedtime  vancomycin  IVPB 1250 milliGRAM(s) IV Intermittent every 12 hours    MEDICATIONS  (PRN):  oxycodone    5 mG/acetaminophen 325 mG 2 Tablet(s) Oral every 4 hours PRN Severe Pain (7 - 10)  oxycodone    5 mG/acetaminophen 325 mG 1 Tablet(s) Oral every 4 hours PRN Moderate Pain (4 - 6)      Allergies    Vasotec (Hives)    Intolerances    REVIEW OF SYSTEMS:  CONSTITUTIONAL: No fever,   EYES: no acute visual disturbances  NECK: No pain or stiffness  RESPIRATORY: No cough; No shortness of breath  CARDIOVASCULAR: No chest pain, no palpitations  GASTROINTESTINAL: No pain. No nausea or vomiting; No diarrhea   NEUROLOGICAL: No headache or numbness, no tremors  MUSCULOSKELETAL: No joint pain, no muscle pain  Reports right hand pain   GENITOURINARY: no dysuria, no frequency, no hesitancy  PSYCHIATRY: no depression , no anxiety  ALL OTHER  ROS negative        ALL OTHER SYSTEMS REVIEWED AND NEGATIVE    UNABLE TO OBTAIN    PHYSICAL EXAM:  VITALS: T(C): 36.8 (05-24-21 @ 05:44)  T(F): 98.2 (05-24-21 @ 05:44), Max: 98.6 (05-23-21 @ 14:35)  HR: 83 (05-24-21 @ 05:44) (83 - 95)  BP: 167/90 (05-24-21 @ 05:44) (165/98 - 175/103)  RR:  (16 - 18)  SpO2:  (96% - 98%)  Wt(kg): --      PHYSICAL EXAM:  GENERAL: male in bed, in no acute distress   HEENT: Normocephalic;  conjunctivae and sclerae clear; moist mucous membranes;   NECK : supple  CHEST/LUNG: Clear to auscultation bilaterally with good air entry   HEART: S1 S2  regular; no murmurs, gallops or rubs  ABDOMEN: Soft, Nontender, Nondistended; Bowel sounds present  EXTREMITIES: no cyanosis; no edema; no calf tenderness  right hand: scar tissue noted   SKIN: warm and dry; no rash  NERVOUS SYSTEM:  Awake and alert; Oriented  to place, person and time ; no new deficits      POCT Blood Glucose.: 228 mg/dL (05-24-21 @ 11:45)  POCT Blood Glucose.: 267 mg/dL (05-24-21 @ 07:40)  POCT Blood Glucose.: 379 mg/dL (05-23-21 @ 21:15)  POCT Blood Glucose.: 229 mg/dL (05-23-21 @ 16:32)  POCT Blood Glucose.: 236 mg/dL (05-23-21 @ 11:35)  POCT Blood Glucose.: 279 mg/dL (05-23-21 @ 07:44)  POCT Blood Glucose.: 314 mg/dL (05-22-21 @ 21:43)  POCT Blood Glucose.: 381 mg/dL (05-22-21 @ 16:33)  POCT Blood Glucose.: 267 mg/dL (05-22-21 @ 12:28)  POCT Blood Glucose.: 272 mg/dL (05-22-21 @ 07:42)  POCT Blood Glucose.: 347 mg/dL (05-21-21 @ 20:59)  POCT Blood Glucose.: 245 mg/dL (05-21-21 @ 18:10)  POCT Blood Glucose.: 283 mg/dL (05-21-21 @ 13:47)                            12.9   8.03  )-----------( 295      ( 24 May 2021 08:40 )             38.5       05-24    132<L>  |  99  |  13  ----------------------------<  401<H>  3.9   |  23  |  0.93    EGFR if : 107  EGFR if non : 93    Ca    8.6      05-24  Mg     2.0     05-22  Phos  2.3     05-22    TPro  7.0  /  Alb  2.8<L>  /  TBili  0.8  /  DBili  x   /  AST  5<L>  /  ALT  19  /  AlkPhos  64  05-22      Thyroid Function Tests:  05-22 @ 07:19 TSH 0.92 FreeT4 -- T3 -- Anti TPO -- Anti Thyroglobulin Ab -- TSI --          05-22 Chol 192 Direct LDL -- LDL calculated 95 HDL 52 Trig 227<H>    Radiology:

## 2021-05-24 NOTE — PROGRESS NOTE ADULT - PROBLEM SELECTOR PLAN 1
Resolving  pt p/w tachycardia and leucocytosis meeting 2 of SIRS criteria  lactate 2.4 now normal  On cefepime, vanc, and bacitracin   UA negative  likely due to cellulitis  f/u urine and blood cx  ID Dr. Lunsford helping with Antibiotics, rec oral regimen outpatient  will continue to treat with IV abx inpatient as patient is concerned that oral abx will not help

## 2021-05-24 NOTE — PROGRESS NOTE ADULT - ASSESSMENT
Patient is a 55y old  Male with PMH of DM and tendon rupture, now presents to the ER for evaluation of right hand pain. He had surgery on his right hand 2 weeks ago at [facility] for Dupuytren contracture. His doctor told him that the stitches would fall off by Monday but they did not and patient went to his Surgeon's office on Wed to get stitches removed. Also reports hand being inflamed . According to him, he got local anesthesia  and the doctor hit his ? nerve. He has severe pain in his right hand since then along with swelling. His doctor was not working so he came to the ER due to pain. He took Motrin, Tylenol, Advil with no relief. He has no fever, but chills and nausea. Reports pain extending from hand all the way to the shoulders, has difficulty moving his hand. Patient plays cricket and has h/o tendon rupture. ON admission, he has no fever but Tachycardia and Leukocytosis. The XRay of right hand has not revealing. He has started on Unasyn, and the ID consult requested to assist with further evaluation and antibiotic management.     # Sepsis ( Tachycardia + Leukocytosis )- resolved  # Right hand post-op wound infection    would recommend:    1. Adjust Vancomycin doses to 1250 mg  [id number]  2. Continue Cefepime   3. May change to oral doxycycline 100 mg q 12hours and Augmentin 875 mg q 12hours on discharge to continue until 5/29/21  4. Pain management as needed  5. Local wound care    d/w Patient and Nursing staff     Attending Attestation:    Spent more than 35 minutes on total encounter, more than 50 % of the visit was spent counseling and/or coordinating care by the Attending physician. Patient is a 55y old  Male with PMH of DM and tendon rupture, now presents to the ER for evaluation of right hand pain. He had surgery on his right hand 2 weeks ago at [facility] for Dupuytren contracture. His doctor told him that the stitches would fall off by Monday but they did not and patient went to his Surgeon's office on Wed to get stitches removed. Also reports hand being inflamed . According to him, he got local anesthesia  and the doctor hit his ? nerve. He has severe pain in his right hand since then along with swelling. His doctor was not working so he came to the ER due to pain. He took Motrin, Tylenol, Advil with no relief. He has no fever, but chills and nausea. Reports pain extending from hand all the way to the shoulders, has difficulty moving his hand. Patient plays cricket and has h/o tendon rupture. ON admission, he has no fever but Tachycardia and Leukocytosis. The XRay of right hand has not revealing. He has started on Unasyn, and the ID consult requested to assist with further evaluation and antibiotic management.     # Sepsis ( Tachycardia + Leukocytosis )- resolved  # Right hand post-op wound infection    would recommend:    1. Please obtain Vancomycin trough and adjust doses to keep level between 15 to 20  2. Continue Cefepime   3. May change to oral doxycycline 100 mg q 12hours and Augmentin 875 mg q 12hours on discharge to continue until 5/29/21  4. Pain management as needed  5. Management of Blood pressure as per Primary team     d/w Patient, House and Nursing staff     Attending Attestation:    Spent more than 35 minutes on total encounter, more than 50 % of the visit was spent counseling and/or coordinating care by the Attending physician.

## 2021-05-24 NOTE — CHART NOTE - NSCHARTNOTEFT_GEN_A_CORE
Patient requested gabapentin describing what is likely neuropathic pain affecting his bilateral hands R>L, worsening in PM. A1c is 10.6. Gave one dose of 300mg Neurontin; renal function wnl. Primary team to be aware/ start scheduled dosing.

## 2021-05-24 NOTE — PROGRESS NOTE ADULT - SUBJECTIVE AND OBJECTIVE BOX
INTERVAL HPI/OVERNIGHT EVENTS:    No acute events overnight.   R hand wound improving  no active drainage  denies f/c, numbness or tingling on R hand      MEDICATIONS  (STANDING):  BACItracin   Ointment 1 Application(s) Topical three times a day  cefepime   IVPB      cefepime   IVPB 1000 milliGRAM(s) IV Intermittent every 8 hours  enoxaparin Injectable 40 milliGRAM(s) SubCutaneous daily  insulin glargine Injectable (LANTUS) 30 Unit(s) SubCutaneous at bedtime  insulin lispro (ADMELOG) corrective regimen sliding scale   SubCutaneous three times a day before meals  insulin lispro (ADMELOG) corrective regimen sliding scale   SubCutaneous at bedtime  ketorolac   Injectable 30 milliGRAM(s) IV Push every 8 hours  losartan 50 milliGRAM(s) Oral daily  polyethylene glycol 3350 17 Gram(s) Oral daily  senna 2 Tablet(s) Oral at bedtime  vancomycin  IVPB 1250 milliGRAM(s) IV Intermittent every 12 hours    MEDICATIONS  (PRN):  oxycodone    5 mG/acetaminophen 325 mG 2 Tablet(s) Oral every 4 hours PRN Severe Pain (7 - 10)  oxycodone    5 mG/acetaminophen 325 mG 1 Tablet(s) Oral every 4 hours PRN Moderate Pain (4 - 6)      Vital Signs Last 24 Hrs  T(C): 36.8 (24 May 2021 05:44), Max: 37 (23 May 2021 14:35)  T(F): 98.2 (24 May 2021 05:44), Max: 98.6 (23 May 2021 14:35)  HR: 83 (24 May 2021 05:44) (83 - 95)  BP: 167/90 (24 May 2021 05:44) (165/98 - 175/103)  BP(mean): 114 (23 May 2021 14:35) (114 - 114)  RR: 18 (24 May 2021 05:44) (16 - 18)  SpO2: 98% (24 May 2021 05:44) (96% - 98%)      PHYSICAL EXAM  General: Alert and oriented, not in acute distress  Resp: Breathing unlabored  hand: R hand incision involving palmar crease appears improving, no drainage, no fluctuance, motor limited due to trace swelling and pain, distal sensation intact    I&O's Detail      LABS:

## 2021-05-24 NOTE — PROGRESS NOTE ADULT - SUBJECTIVE AND OBJECTIVE BOX
PGY-1 Progress Note discussed with attending    PAGER #: [phone]] TILL 5:00 PM  PLEASE CONTACT ON CALL TEAM:  - On Call Team (Please refer to Mario) FROM 5:00 PM - 8:30PM  - Nightfloat Team FROM 8:30 -7:30 AM      INTERVAL HPI/OVERNIGHT EVENTS: Endorses waxing/waning pain in right hand, with improvement in the am and worsening in the pm. Will continue IV abx for now, pain management consulted.     MEDICATIONS  (STANDING):  BACItracin   Ointment 1 Application(s) Topical three times a day  cefepime   IVPB      cefepime   IVPB 1000 milliGRAM(s) IV Intermittent every 8 hours  enoxaparin Injectable 40 milliGRAM(s) SubCutaneous daily  insulin glargine Injectable (LANTUS) 40 Unit(s) SubCutaneous at bedtime  insulin lispro (ADMELOG) corrective regimen sliding scale   SubCutaneous at bedtime  insulin lispro (ADMELOG) corrective regimen sliding scale   SubCutaneous three times a day before meals  insulin lispro Injectable (ADMELOG) 4 Unit(s) SubCutaneous three times a day before meals  losartan 50 milliGRAM(s) Oral daily  polyethylene glycol 3350 17 Gram(s) Oral daily  senna 2 Tablet(s) Oral at bedtime  vancomycin  IVPB 1250 milliGRAM(s) IV Intermittent every 12 hours    MEDICATIONS  (PRN):  morphine  - Injectable 2 milliGRAM(s) IV Push every 6 hours PRN Severe Pain (7 - 10)  oxycodone    5 mG/acetaminophen 325 mG 2 Tablet(s) Oral every 4 hours PRN Severe Pain (7 - 10)  oxycodone    5 mG/acetaminophen 325 mG 1 Tablet(s) Oral every 4 hours PRN Moderate Pain (4 - 6)    REVIEW OF SYSTEMS:  CONSTITUTIONAL: No fever or fatigue  RESPIRATORY: No cough, no SOB  CARDIOVASCULAR: No CP, no dizziness  GASTROINTESTINAL: no abdominal pain, no n/v/d/c  GENITOURINARY: No dysuria or hematuria  NEUROLOGICAL: No headaches  SKIN: No itching or lesions   EXT: pain and swelling in r arm, waxing/waning  all other systems reviewed and are negative     Vital Signs Last 24 Hrs  T(C): 37 (24 May 2021 14:27), Max: 37 (24 May 2021 14:27)  T(F): 98.6 (24 May 2021 14:27), Max: 98.6 (24 May 2021 14:27)  HR: 113 (24 May 2021 14:51) (83 - 113)  BP: 200/105 (24 May 2021 14:51) (167/90 - 210/125)  BP(mean): 154 (24 May 2021 14:27) (154 - 154)  RR: 17 (24 May 2021 14:27) (17 - 18)  SpO2: 97% (24 May 2021 14:27) (96% - 98%)    PHYSICAL EXAMINATION:  GENERAL: NAD, well built  HEAD:  Atraumatic, Normocephalic  EYES:  conjunctiva and sclera clear  NECK: Supple  CHEST/LUNG: CTA b/l, no increased WOB  HEART: RRR  ABDOMEN: soft, NTND, +BS  NERVOUS SYSTEM: alert and oriented x3  EXTREMITIES:  r hand swollen to wrist, scar noted in palm, tender to palpation   SKIN: warm dry                          12.9   8.03  )-----------( 295      ( 24 May 2021 08:40 )             38.5     05-24    132<L>  |  99  |  13  ----------------------------<  401<H>  3.9   |  23  |  0.93    Ca    8.6      24 May 2021 08:40                CAPILLARY BLOOD GLUCOSE      RADIOLOGY & ADDITIONAL TESTS:

## 2021-05-24 NOTE — DIETITIAN INITIAL EVALUATION ADULT. - PERTINENT LABORATORY DATA
05-24 Na132 mmol/L<L> Glu 401 mg/dL<H> K+ 3.9 mmol/L Cr  0.93 mg/dL BUN 13 mg/dL 05-22 Phos 2.3 mg/dL<L> 05-22 Alb 2.8 g/dL<L> 05-22 Chol 192 mg/dL LDL --    HDL 52 mg/dL Trig 227 mg/dL<H>

## 2021-05-24 NOTE — DIETITIAN INITIAL EVALUATION ADULT. - PROBLEM SELECTOR PLAN 2
pt p/w right hand swelling and pain  start on iv antibiotic  c/w pain control prn   f/u cultures   f/u doppler  ID Dr Lunsford  concern for compartment syndrome due to worsening pain and swelling- hand surgeon unable to be reached (office  closeD)  called surgical PA, he suggests patient should be transferred as we do not have hand surgeon here  informed attending Dr Hinds

## 2021-05-24 NOTE — PROGRESS NOTE ADULT - ATTENDING COMMENTS
Seen and examined earlier . Still has pain right hand but better . CRP ,ESR and WBC trending down. ID help appreciated and changing to PO Abxs on DC . Surgical consult noted and no intervention . I spoke to his surgeon Dr. Angeles  and advised to have patient follow up on Wednesday before 10 AM . She also gave pt her cell for any emergency . BP running high so added BB with hold parameters.

## 2021-05-24 NOTE — CONSULT NOTE ADULT - ASSESSMENT
Patient, 55 y old male with PMH of DM and tendon rupture presents to the ED with complaint of right hand pain. Patient reports he had surgery on his right hand 2 weeks ago at [facility] for dupuytren contracture. Endo consulted for uncontrolled DM. A1c: 10.6. Reports non compliance with metformin and that he was trying to control his blood glucose with dietary compliance. Pt reluctant to starting insulin at this point. Pt educated about diabetes and about importance of insulin for better glucose control and also that it will help in quicker healing of his hand infection. pt agreed for long acting insulin at bedtime.

## 2021-05-24 NOTE — PROGRESS NOTE ADULT - SUBJECTIVE AND OBJECTIVE BOX
Patient is seen and examined at the bed side, is afebrile. He is feeling much better. The blood cultures have no growth to date.      REVIEW OF SYSTEMS: All other review systems are negative      ALLERGIES: Vasotec (Hives)      Vital Signs Last 24 Hrs  T(C): 37 (24 May 2021 14:27), Max: 37 (24 May 2021 14:27)  T(F): 98.6 (24 May 2021 14:27), Max: 98.6 (24 May 2021 14:27)  HR: 89 (24 May 2021 18:37) (80 - 113)  BP: 152/91 (24 May 2021 18:37) (152/91 - 210/125)  BP(mean): 154 (24 May 2021 14:27) (154 - 154)  RR: 18 (24 May 2021 18:37) (17 - 18)  SpO2: 100% (24 May 2021 18:37) (96% - 100%)      PHYSICAL EXAM:  GENERAL: Not in distress   CHEST/LUNG: Not using accessory muscles   HEART: s1 and s2 present  ABDOMEN:  Nontender and  Nondistended  EXTREMITIES: Right hand swollen, erythematous palm with a wound and unable to   CNS: Awake and Alert      LABS:                         12.9   8.72  )-----------( 310      ( 24 May 2021 17:42 )             38.3                           12.3   11.46 )-----------( 272      ( 22 May 2021 07:19 )             37.4                         14.0   14.95 )-----------( 282      ( 21 May 2021 11:04 )             41.5             134<L>  |  102  |  13  ----------------------------<  264<H>  4.1   |  26  |  0.90    Ca    8.7      24 May 2021 17:42    TPro  7.4  /  Alb  3.1<L>  /  TBili  0.4  /  DBili  x   /  AST  18  /  ALT  31  /  AlkPhos  64      134<L>  |  99  |  7   ----------------------------<  297<H>  3.7   |  24  |  0.76    Ca    8.6      22 May 2021 07:19  Phos  2.3       Mg     2.0         TPro  7.0  /  Alb  2.8<L>  /  TBili  0.8  /  DBili  x   /  AST  5<L>  /  ALT  19  /  AlkPhos  64      132<L>  |  98  |  11  ----------------------------<  396<H>  4.5   |  21<L>  |  1.03    Ca    9.2      21 May 2021 11:04    TPro  8.2  /  Alb  3.5  /  TBili  1.0  /  DBili  x   /  AST  15  /  ALT  31  /  AlkPhos  79      PT/INR - ( 21 May 2021 11:04 )   PT: 13.3 sec;   INR: 1.12 ratio      PTT - ( 21 May 2021 11:04 )  PTT:27.7 sec      CAPILLARY BLOOD GLUCOSE  POCT Blood Glucose.: 245 mg/dL (21 May 2021 18:10)  POCT Blood Glucose.: 283 mg/dL (21 May 2021 13:47)      Urinalysis Basic - ( 21 May 2021 12:07 )  Color: Yellow / Appearance: Clear / S.010 / pH: x  Gluc: x / Ketone: Trace  / Bili: Negative / Urobili: Negative   Blood: x / Protein: Negative / Nitrite: Negative   Leuk Esterase: Negative / RBC: x / WBC x   Sq Epi: x / Non Sq Epi: x / Bacteria: x    Vancomycin Level, Trough (21 @ 05:20)   Vancomycin Level, Trough: 5.7      MEDICATIONS  (STANDING):    BACItracin   Ointment 1 Application(s) Topical three times a day  cefepime   IVPB      cefepime   IVPB 1000 milliGRAM(s) IV Intermittent every 8 hours  enoxaparin Injectable 40 milliGRAM(s) SubCutaneous daily  insulin glargine Injectable (LANTUS) 40 Unit(s) SubCutaneous at bedtime  insulin lispro (ADMELOG) corrective regimen sliding scale   SubCutaneous three times a day before meals  insulin lispro (ADMELOG) corrective regimen sliding scale   SubCutaneous at bedtime  insulin lispro Injectable (ADMELOG) 4 Unit(s) SubCutaneous three times a day before meals  losartan 50 milliGRAM(s) Oral daily  metoprolol succinate ER 50 milliGRAM(s) Oral daily  polyethylene glycol 3350 17 Gram(s) Oral daily  senna 2 Tablet(s) Oral at bedtime  vancomycin  IVPB 1250 milliGRAM(s) IV Intermittent every 12 hours        RADIOLOGY & ADDITIONAL TESTS:    21 : Xray Hand 3 Views, Right (21 @ 11:22) :  Right hand. 3 views. Patient has local pain and swelling after injection.  There is mild film IP degeneration and lesser degeneration elsewhere in the digits. No bone destruction or fracture.        MICROBIOLOGY DATA:        mCulture - Urine (21 @ 18:21)   Specimen Source: .Urine Clean Catch (Midstream)   Culture Results: No growth     Culture - Blood (21 @ 18:19)   Specimen Source: .Blood Blood-Peripheral   Culture Results: No growth to date.     Culture - Blood (21 @ 18:19)   Specimen Source: .Blood Blood-Peripheral   Culture Results: No growth to date.     COVID-19 Ambrocio Domain Antibody (21 @ 11:18)   COVID-19 Ambrocio Domain Antibody Result: >250.00:     COVID-19 PCR . (21 @ 12:07)   COVID-19 PCR: NotDetec:          Patient is seen and examined at the bed side, is afebrile. The discharge has been cancelled due to high blood pressure.       REVIEW OF SYSTEMS: All other review systems are negative      ALLERGIES: Vasotec (Hives)      Vital Signs Last 24 Hrs  T(C): 37 (24 May 2021 14:27), Max: 37 (24 May 2021 14:27)  T(F): 98.6 (24 May 2021 14:27), Max: 98.6 (24 May 2021 14:27)  HR: 89 (24 May 2021 18:37) (80 - 113)  BP: 152/91 (24 May 2021 18:37) (152/91 - 210/125)  BP(mean): 154 (24 May 2021 14:27) (154 - 154)  RR: 18 (24 May 2021 18:37) (17 - 18)  SpO2: 100% (24 May 2021 18:37) (96% - 100%)      PHYSICAL EXAM:  GENERAL: Not in distress   CHEST/LUNG: Not using accessory muscles   HEART: s1 and s2 present  ABDOMEN:  Nontender and  Nondistended  EXTREMITIES: Right hand swelling and pain improved significantly   CNS: Awake and Alert      LABS:                         12.9   8.72  )-----------( 310      ( 24 May 2021 17:42 )             38.3                           12.3   11.46 )-----------( 272      ( 22 May 2021 07:19 )             37.4                         14.0   14.95 )-----------( 282      ( 21 May 2021 11:04 )             41.5             134<L>  |  102  |  13  ----------------------------<  264<H>  4.1   |  26  |  0.90    Ca    8.7      24 May 2021 17:42    TPro  7.4  /  Alb  3.1<L>  /  TBili  0.4  /  DBili  x   /  AST  18  /  ALT  31  /  AlkPhos  64      132<L>  |  98  |  11  ----------------------------<  396<H>  4.5   |  21<L>  |  1.03    Ca    9.2      21 May 2021 11:04    TPro  8.2  /  Alb  3.5  /  TBili  1.0  /  DBili  x   /  AST  15  /  ALT  31  /  AlkPhos  79      PT/INR - ( 21 May 2021 11:04 )   PT: 13.3 sec;   INR: 1.12 ratio      PTT - ( 21 May 2021 11:04 )  PTT:27.7 sec      CAPILLARY BLOOD GLUCOSE  POCT Blood Glucose.: 245 mg/dL (21 May 2021 18:10)  POCT Blood Glucose.: 283 mg/dL (21 May 2021 13:47)      Urinalysis Basic - ( 21 May 2021 12:07 )  Color: Yellow / Appearance: Clear / S.010 / pH: x  Gluc: x / Ketone: Trace  / Bili: Negative / Urobili: Negative   Blood: x / Protein: Negative / Nitrite: Negative   Leuk Esterase: Negative / RBC: x / WBC x   Sq Epi: x / Non Sq Epi: x / Bacteria: x      Vancomycin Level, Trough (21 @ 05:20)   Vancomycin Level, Trough: 5.7      MEDICATIONS  (STANDING):    BACItracin   Ointment 1 Application(s) Topical three times a day  cefepime   IVPB      cefepime   IVPB 1000 milliGRAM(s) IV Intermittent every 8 hours  enoxaparin Injectable 40 milliGRAM(s) SubCutaneous daily  insulin glargine Injectable (LANTUS) 40 Unit(s) SubCutaneous at bedtime  insulin lispro (ADMELOG) corrective regimen sliding scale   SubCutaneous three times a day before meals  insulin lispro (ADMELOG) corrective regimen sliding scale   SubCutaneous at bedtime  insulin lispro Injectable (ADMELOG) 4 Unit(s) SubCutaneous three times a day before meals  losartan 50 milliGRAM(s) Oral daily  metoprolol succinate ER 50 milliGRAM(s) Oral daily  polyethylene glycol 3350 17 Gram(s) Oral daily  senna 2 Tablet(s) Oral at bedtime  vancomycin  IVPB 1250 milliGRAM(s) IV Intermittent every 12 hours        RADIOLOGY & ADDITIONAL TESTS:    21 : Xray Hand 3 Views, Right (21 @ 11:22) :  Right hand. 3 views. Patient has local pain and swelling after injection.  There is mild film IP degeneration and lesser degeneration elsewhere in the digits. No bone destruction or fracture.        MICROBIOLOGY DATA:        mCulture - Urine (21 @ 18:21)   Specimen Source: .Urine Clean Catch (Midstream)   Culture Results: No growth     Culture - Blood (21 @ 18:19)   Specimen Source: .Blood Blood-Peripheral   Culture Results: No growth to date.     Culture - Blood (21 @ 18:19)   Specimen Source: .Blood Blood-Peripheral   Culture Results: No growth to date.     COVID-19 Ambrocio Domain Antibody (21 @ 11:18)   COVID-19 Ambrocio Domain Antibody Result: >250.00:     COVID-19 PCR . (21 @ 12:07)   COVID-19 PCR: NotDetec:

## 2021-05-24 NOTE — CONSULT NOTE ADULT - PROBLEM SELECTOR RECOMMENDATION 9
- A1c: 10.6, Pt reports non compliance with medication, was non compliant with metformin, reports compliance with diet.   - Pt reluctant to start insulin. Pt educated at length about insulin with help with better diabetes control, which will also quicker recovery of his hand. Agreed for bedtime time insulin on discharge. Primary team to ensure insulin coverage on discharge.   - Will recommend Lantus 35 units at bedtime, humalog 4 units premeal with SS coverage pre meal and bedtime.   - Diabetes education   - Most likely will discharge on Lantus and metformin on discharge, will follow for final recommendation - A1c: 10.6, Pt reports non compliance with medication, was non compliant with metformin, reports compliance with diet.   - Pt reluctant to start insulin. Pt educated at length about insulin with help with better diabetes control, which will also quicker recovery of his hand. Agreed for bedtime time insulin on discharge. Primary team to ensure insulin coverage on discharge.   - Will recommend Lantus 40 units at bedtime, humalog 4 units premeal with SS coverage pre meal and bedtime.   - Diabetes education   - Most likely will discharge on Lantus and metformin on discharge, will follow for final recommendation - A1c: 10.6, Pt reports non compliance with medication, was non compliant with metformin, reports compliance with diet.   - Pt reluctant to start insulin. Pt educated at length about insulin with help with better diabetes control, which will also quicker recovery of his hand. Agreed for bedtime time insulin on discharge. Primary team to ensure insulin coverage on discharge.   - Will recommend Lantus 40 units at bedtime, humalog 4 units premeal with SS coverage pre meal and bedtime.   - Diabetes education   - Most likely will discharge on Lantus and metformin on discharge, will follow for final recommendation  d/w hs

## 2021-05-24 NOTE — DIETITIAN INITIAL EVALUATION ADULT. - OTHER INFO
visited patient in room, declined interview at this time. Per RN, patient consuming meals. No GI issues. patient with elevated A1C:10.6 , DM education deferred at this time.

## 2021-05-25 LAB
ALBUMIN SERPL ELPH-MCNC: 2.9 G/DL — LOW (ref 3.5–5)
ALP SERPL-CCNC: 69 U/L — SIGNIFICANT CHANGE UP (ref 40–120)
ALT FLD-CCNC: 37 U/L DA — SIGNIFICANT CHANGE UP (ref 10–60)
ANION GAP SERPL CALC-SCNC: 9 MMOL/L — SIGNIFICANT CHANGE UP (ref 5–17)
AST SERPL-CCNC: 16 U/L — SIGNIFICANT CHANGE UP (ref 10–40)
BILIRUB SERPL-MCNC: 0.5 MG/DL — SIGNIFICANT CHANGE UP (ref 0.2–1.2)
BUN SERPL-MCNC: 11 MG/DL — SIGNIFICANT CHANGE UP (ref 7–18)
CALCIUM SERPL-MCNC: 8.7 MG/DL — SIGNIFICANT CHANGE UP (ref 8.4–10.5)
CHLORIDE SERPL-SCNC: 104 MMOL/L — SIGNIFICANT CHANGE UP (ref 96–108)
CO2 SERPL-SCNC: 26 MMOL/L — SIGNIFICANT CHANGE UP (ref 22–31)
CREAT SERPL-MCNC: 0.85 MG/DL — SIGNIFICANT CHANGE UP (ref 0.5–1.3)
GLUCOSE BLDC GLUCOMTR-MCNC: 166 MG/DL — HIGH (ref 70–99)
GLUCOSE BLDC GLUCOMTR-MCNC: 183 MG/DL — HIGH (ref 70–99)
GLUCOSE BLDC GLUCOMTR-MCNC: 213 MG/DL — HIGH (ref 70–99)
GLUCOSE BLDC GLUCOMTR-MCNC: 230 MG/DL — HIGH (ref 70–99)
GLUCOSE SERPL-MCNC: 184 MG/DL — HIGH (ref 70–99)
HCT VFR BLD CALC: 39.1 % — SIGNIFICANT CHANGE UP (ref 39–50)
HGB BLD-MCNC: 13 G/DL — SIGNIFICANT CHANGE UP (ref 13–17)
MAGNESIUM SERPL-MCNC: 2.1 MG/DL — SIGNIFICANT CHANGE UP (ref 1.6–2.6)
MCHC RBC-ENTMCNC: 27.2 PG — SIGNIFICANT CHANGE UP (ref 27–34)
MCHC RBC-ENTMCNC: 33.2 GM/DL — SIGNIFICANT CHANGE UP (ref 32–36)
MCV RBC AUTO: 81.8 FL — SIGNIFICANT CHANGE UP (ref 80–100)
NRBC # BLD: 0 /100 WBCS — SIGNIFICANT CHANGE UP (ref 0–0)
PHOSPHATE SERPL-MCNC: 3.7 MG/DL — SIGNIFICANT CHANGE UP (ref 2.5–4.5)
PLATELET # BLD AUTO: 308 K/UL — SIGNIFICANT CHANGE UP (ref 150–400)
POTASSIUM SERPL-MCNC: 3.7 MMOL/L — SIGNIFICANT CHANGE UP (ref 3.5–5.3)
POTASSIUM SERPL-SCNC: 3.7 MMOL/L — SIGNIFICANT CHANGE UP (ref 3.5–5.3)
PROT SERPL-MCNC: 7.5 G/DL — SIGNIFICANT CHANGE UP (ref 6–8.3)
RBC # BLD: 4.78 M/UL — SIGNIFICANT CHANGE UP (ref 4.2–5.8)
RBC # FLD: 11.9 % — SIGNIFICANT CHANGE UP (ref 10.3–14.5)
SODIUM SERPL-SCNC: 139 MMOL/L — SIGNIFICANT CHANGE UP (ref 135–145)
VANCOMYCIN TROUGH SERPL-MCNC: 10.6 UG/ML — SIGNIFICANT CHANGE UP (ref 10–20)
WBC # BLD: 7.54 K/UL — SIGNIFICANT CHANGE UP (ref 3.8–10.5)
WBC # FLD AUTO: 7.54 K/UL — SIGNIFICANT CHANGE UP (ref 3.8–10.5)

## 2021-05-25 PROCEDURE — 99223 1ST HOSP IP/OBS HIGH 75: CPT

## 2021-05-25 PROCEDURE — 99231 SBSQ HOSP IP/OBS SF/LOW 25: CPT

## 2021-05-25 RX ORDER — GABAPENTIN 400 MG/1
100 CAPSULE ORAL ONCE
Refills: 0 | Status: COMPLETED | OUTPATIENT
Start: 2021-05-25 | End: 2021-05-25

## 2021-05-25 RX ORDER — VANCOMYCIN HCL 1 G
1500 VIAL (EA) INTRAVENOUS EVERY 12 HOURS
Refills: 0 | Status: DISCONTINUED | OUTPATIENT
Start: 2021-05-25 | End: 2021-05-26

## 2021-05-25 RX ORDER — LABETALOL HCL 100 MG
10 TABLET ORAL ONCE
Refills: 0 | Status: DISCONTINUED | OUTPATIENT
Start: 2021-05-25 | End: 2021-05-25

## 2021-05-25 RX ORDER — OXYCODONE AND ACETAMINOPHEN 5; 325 MG/1; MG/1
1 TABLET ORAL EVERY 4 HOURS
Refills: 0 | Status: DISCONTINUED | OUTPATIENT
Start: 2021-05-25 | End: 2021-05-27

## 2021-05-25 RX ORDER — GABAPENTIN 400 MG/1
200 CAPSULE ORAL THREE TIMES A DAY
Refills: 0 | Status: DISCONTINUED | OUTPATIENT
Start: 2021-05-25 | End: 2021-05-27

## 2021-05-25 RX ORDER — DIPHENHYDRAMINE HCL 50 MG
25 CAPSULE ORAL ONCE
Refills: 0 | Status: COMPLETED | OUTPATIENT
Start: 2021-05-25 | End: 2021-05-25

## 2021-05-25 RX ORDER — KETOROLAC TROMETHAMINE 30 MG/ML
15 SYRINGE (ML) INJECTION EVERY 8 HOURS
Refills: 0 | Status: DISCONTINUED | OUTPATIENT
Start: 2021-05-25 | End: 2021-05-25

## 2021-05-25 RX ORDER — HYDRALAZINE HCL 50 MG
5 TABLET ORAL ONCE
Refills: 0 | Status: COMPLETED | OUTPATIENT
Start: 2021-05-25 | End: 2021-05-25

## 2021-05-25 RX ORDER — KETOROLAC TROMETHAMINE 30 MG/ML
15 SYRINGE (ML) INJECTION ONCE
Refills: 0 | Status: DISCONTINUED | OUTPATIENT
Start: 2021-05-25 | End: 2021-05-25

## 2021-05-25 RX ORDER — LABETALOL HCL 100 MG
10 TABLET ORAL ONCE
Refills: 0 | Status: COMPLETED | OUTPATIENT
Start: 2021-05-25 | End: 2021-05-25

## 2021-05-25 RX ORDER — IBUPROFEN 200 MG
400 TABLET ORAL EVERY 8 HOURS
Refills: 0 | Status: DISCONTINUED | OUTPATIENT
Start: 2021-05-25 | End: 2021-05-25

## 2021-05-25 RX ORDER — GABAPENTIN 400 MG/1
100 CAPSULE ORAL THREE TIMES A DAY
Refills: 0 | Status: DISCONTINUED | OUTPATIENT
Start: 2021-05-25 | End: 2021-05-25

## 2021-05-25 RX ADMIN — OXYCODONE AND ACETAMINOPHEN 1 TABLET(S): 5; 325 TABLET ORAL at 23:17

## 2021-05-25 RX ADMIN — Medication 300 MILLIGRAM(S): at 17:23

## 2021-05-25 RX ADMIN — Medication 400 MILLIGRAM(S): at 10:49

## 2021-05-25 RX ADMIN — Medication 1 APPLICATION(S): at 13:48

## 2021-05-25 RX ADMIN — Medication 15 MILLIGRAM(S): at 17:00

## 2021-05-25 RX ADMIN — OXYCODONE AND ACETAMINOPHEN 1 TABLET(S): 5; 325 TABLET ORAL at 22:47

## 2021-05-25 RX ADMIN — Medication 4 UNIT(S): at 12:01

## 2021-05-25 RX ADMIN — OXYCODONE AND ACETAMINOPHEN 1 TABLET(S): 5; 325 TABLET ORAL at 13:53

## 2021-05-25 RX ADMIN — Medication 50 MILLIGRAM(S): at 05:54

## 2021-05-25 RX ADMIN — Medication 25 MILLIGRAM(S): at 22:44

## 2021-05-25 RX ADMIN — Medication 1: at 17:20

## 2021-05-25 RX ADMIN — OXYCODONE AND ACETAMINOPHEN 1 TABLET(S): 5; 325 TABLET ORAL at 06:31

## 2021-05-25 RX ADMIN — Medication 15 MILLIGRAM(S): at 16:16

## 2021-05-25 RX ADMIN — Medication 10 MILLIGRAM(S): at 23:54

## 2021-05-25 RX ADMIN — Medication 1 APPLICATION(S): at 05:53

## 2021-05-25 RX ADMIN — Medication 4 UNIT(S): at 08:11

## 2021-05-25 RX ADMIN — CEFEPIME 100 MILLIGRAM(S): 1 INJECTION, POWDER, FOR SOLUTION INTRAMUSCULAR; INTRAVENOUS at 13:55

## 2021-05-25 RX ADMIN — Medication 4 UNIT(S): at 17:21

## 2021-05-25 RX ADMIN — GABAPENTIN 200 MILLIGRAM(S): 400 CAPSULE ORAL at 21:16

## 2021-05-25 RX ADMIN — Medication 15 MILLIGRAM(S): at 21:15

## 2021-05-25 RX ADMIN — Medication 15 MILLIGRAM(S): at 21:45

## 2021-05-25 RX ADMIN — Medication 166.67 MILLIGRAM(S): at 05:52

## 2021-05-25 RX ADMIN — OXYCODONE AND ACETAMINOPHEN 1 TABLET(S): 5; 325 TABLET ORAL at 06:01

## 2021-05-25 RX ADMIN — Medication 15 MILLIGRAM(S): at 04:28

## 2021-05-25 RX ADMIN — Medication 1: at 12:00

## 2021-05-25 RX ADMIN — Medication 2: at 08:11

## 2021-05-25 RX ADMIN — GABAPENTIN 100 MILLIGRAM(S): 400 CAPSULE ORAL at 13:47

## 2021-05-25 RX ADMIN — INSULIN GLARGINE 40 UNIT(S): 100 INJECTION, SOLUTION SUBCUTANEOUS at 21:16

## 2021-05-25 RX ADMIN — Medication 400 MILLIGRAM(S): at 11:30

## 2021-05-25 RX ADMIN — OXYCODONE AND ACETAMINOPHEN 1 TABLET(S): 5; 325 TABLET ORAL at 15:00

## 2021-05-25 RX ADMIN — CEFEPIME 100 MILLIGRAM(S): 1 INJECTION, POWDER, FOR SOLUTION INTRAMUSCULAR; INTRAVENOUS at 05:52

## 2021-05-25 RX ADMIN — CEFEPIME 100 MILLIGRAM(S): 1 INJECTION, POWDER, FOR SOLUTION INTRAMUSCULAR; INTRAVENOUS at 21:17

## 2021-05-25 RX ADMIN — Medication 15 MILLIGRAM(S): at 04:58

## 2021-05-25 RX ADMIN — Medication 1 APPLICATION(S): at 21:17

## 2021-05-25 RX ADMIN — LOSARTAN POTASSIUM 50 MILLIGRAM(S): 100 TABLET, FILM COATED ORAL at 05:54

## 2021-05-25 RX ADMIN — GABAPENTIN 100 MILLIGRAM(S): 400 CAPSULE ORAL at 16:18

## 2021-05-25 NOTE — CONSULT NOTE ADULT - TIME BILLING
I have seen and examined the patient at bedside, and I agree with the history, examination, assessment, and plan. I counseled the patient about his likely diagnosis, and the medications to use to help manage his right hand pain.

## 2021-05-25 NOTE — PROGRESS NOTE ADULT - PROBLEM SELECTOR PLAN 1
Pt with right hand pain which is somatic and neuropathic in nature due to right hand cellulitis s/p removal of stiches from recent tendon repair.    Opioid pain recommendations   - Percocet 5-325mg 1  tabs PO q 4 hours PRN severe pain. Monitor for sedation/ respiratory depression.   Non-opioid pain recommendations   - Start ibuprofen 400mg PO TID. Pt is on vanco also, monitor renal function.   - Continue gabapentin 100mg PO 3x/day. (Pt refused to increase dose at this time)  Bowel Regimen  - Continue Miralax 17G PO daily  - Continue Senna 2 tablets at bedtime for constipation  Mild pain   - Non-pharmacological pain treatment recommendations  - Warm/ Cool packs PRN   - Repositioning extremity, elevation, imagery, relaxation, distraction.  - Physical therapy OOB if no contraindications   Recommendations discussed with primary team and RN. Pt with right hand pain which is somatic and neuropathic in nature due to right hand cellulitis s/p removal of stiches from recent tendon repair.    Opioid pain recommendations   - Percocet 5-325mg 1  tabs PO q 4 hours PRN severe pain. Monitor for sedation/ respiratory depression.   Non-opioid pain recommendations   - Start ibuprofen 400mg PO TID. Pt is on vanco also, monitor renal function.   - Increased gabapentin 200mg PO 3x/day. (Pt refused to increase dose at this morning, now agreeable)  Bowel Regimen  - Continue Miralax 17G PO daily  - Continue Senna 2 tablets at bedtime for constipation  Mild pain   - Non-pharmacological pain treatment recommendations  - Warm/ Cool packs PRN   - Repositioning extremity, elevation, imagery, relaxation, distraction.  - Physical therapy OOB if no contraindications   Recommendations discussed with primary team and RN. Pt with right hand pain which is somatic and neuropathic in nature due to right hand cellulitis s/p removal of stiches from recent tendon repair.    Opioid pain recommendations   - Percocet 5-325mg 1  tabs PO q 4 hours PRN severe pain. Monitor for sedation/ respiratory depression.   Non-opioid pain recommendations   - Discontinued ibuprofen 400mg PO TID. Pt is on vanco also, monitor renal function.   - Toradol 15mg IV q8h x 2 doses.  - Increased gabapentin 200mg PO 3x/day. (Pt refused to increase dose at this morning, now agreeable)  Bowel Regimen  - Continue Miralax 17G PO daily  - Continue Senna 2 tablets at bedtime for constipation  Mild pain   - Non-pharmacological pain treatment recommendations  - Warm/ Cool packs PRN   - Repositioning extremity, elevation, imagery, relaxation, distraction.  - Physical therapy OOB if no contraindications   Recommendations discussed with primary team and RN.

## 2021-05-25 NOTE — PROGRESS NOTE ADULT - ASSESSMENT
Confidential Drug Utilization Report  Search Terms: sugar frances, 1965   Search Date: 05/22/2021 08:52:39 AM   The Drug Utilization Report below displays all of the controlled substance prescriptions, if any, that your patient has filled in the last twelve months. The information displayed on this report is compiled from pharmacy submissions to the Department, and accurately reflects the information as submitted by the pharmacies.  This report was requested by: Cristina Scott | Reference #: [id number]   You have not added a ZAKIA number. Keeping your ZAKIA number(s) up to date on the My ZAKIA # page will enable the separation of your prescriptions from others in the search results.   Others' Prescriptions  Patient Name: Sugar Frances   YOB: 1965   Address: [address] [place] [address]   Sex: Male   Rx Written	Rx Dispensed	Drug	Quantity	Days Supply	Prescriber Name	Prescriber Zakia #	Payment Method	Dispenser  04/30/2021	05/01/2021	acetaminophen-cod #3 tablet 	15	5	Health system	[id number]	Medicaid	[id number]  * - Drugs marked with an asterisk are compound drugs. If the compound drug is made up of more than one controlled substance, then each controlled substance will be a separate row in the table.

## 2021-05-25 NOTE — PROGRESS NOTE ADULT - SUBJECTIVE AND OBJECTIVE BOX
Chief Complaint/Follow-up on:   pt seen and examined at bedside.   Pt strongly believes that he can control his DM, by avoiding NSAIDs and diet control. Pt educated about importance of insulin in controlling DM with A1c of 10 and given infection in hand.     Subjective:    MEDICATIONS  (STANDING):  BACItracin   Ointment 1 Application(s) Topical three times a day  cefepime   IVPB      cefepime   IVPB 1000 milliGRAM(s) IV Intermittent every 8 hours  enoxaparin Injectable 40 milliGRAM(s) SubCutaneous daily  gabapentin 100 milliGRAM(s) Oral three times a day  ibuprofen  Tablet. 400 milliGRAM(s) Oral every 8 hours  insulin glargine Injectable (LANTUS) 40 Unit(s) SubCutaneous at bedtime  insulin lispro (ADMELOG) corrective regimen sliding scale   SubCutaneous three times a day before meals  insulin lispro (ADMELOG) corrective regimen sliding scale   SubCutaneous at bedtime  insulin lispro Injectable (ADMELOG) 4 Unit(s) SubCutaneous three times a day before meals  losartan 50 milliGRAM(s) Oral daily  metoprolol succinate ER 50 milliGRAM(s) Oral daily  polyethylene glycol 3350 17 Gram(s) Oral daily  senna 2 Tablet(s) Oral at bedtime  vancomycin  IVPB 1500 milliGRAM(s) IV Intermittent every 12 hours    MEDICATIONS  (PRN):  oxycodone    5 mG/acetaminophen 325 mG 2 Tablet(s) Oral every 4 hours PRN Severe Pain (7 - 10)  oxycodone    5 mG/acetaminophen 325 mG 1 Tablet(s) Oral every 4 hours PRN Moderate Pain (4 - 6)      PHYSICAL EXAM:  VITALS: T(C): 37.1 (05-25-21 @ 05:25)  T(F): 98.8 (05-25-21 @ 05:25), Max: 98.8 (05-25-21 @ 05:25)  HR: 85 (05-25-21 @ 05:25) (80 - 113)  BP: 148/90 (05-25-21 @ 05:25) (148/90 - 210/125)  RR:  (16 - 18)  SpO2:  (97% - 100%)  Wt(kg): --  GENERAL: NAD, well-groomed, well-developed  EYES: No proptosis, no injection  HEENT:  Atraumatic, Normocephalic, moist mucous membranes  THYROID: Normal size, no palpable nodules  RESPIRATORY: Clear to auscultation bilaterally; No rales, rhonchi, wheezing, or rubs  CARDIOVASCULAR: Regular rate and rhythm; No murmurs; no peripheral edema  GI: Soft, nontender, non distended, normal bowel sounds  CUSHING'S SIGNS: no striae    POCT Blood Glucose.: 166 mg/dL (05-25-21 @ 11:39)  POCT Blood Glucose.: 230 mg/dL (05-25-21 @ 07:52)  POCT Blood Glucose.: 223 mg/dL (05-24-21 @ 21:18)  POCT Blood Glucose.: 195 mg/dL (05-24-21 @ 16:42)  POCT Blood Glucose.: 228 mg/dL (05-24-21 @ 11:45)  POCT Blood Glucose.: 267 mg/dL (05-24-21 @ 07:40)  POCT Blood Glucose.: 379 mg/dL (05-23-21 @ 21:15)  POCT Blood Glucose.: 229 mg/dL (05-23-21 @ 16:32)  POCT Blood Glucose.: 236 mg/dL (05-23-21 @ 11:35)  POCT Blood Glucose.: 279 mg/dL (05-23-21 @ 07:44)  POCT Blood Glucose.: 314 mg/dL (05-22-21 @ 21:43)  POCT Blood Glucose.: 381 mg/dL (05-22-21 @ 16:33)    05-25    139  |  104  |  11  ----------------------------<  184<H>  3.7   |  26  |  0.85    EGFR if : 114  EGFR if non : 99    Ca    8.7      05-25  Mg     2.1     05-25  Phos  3.7     05-25    TPro  7.5  /  Alb  2.9<L>  /  TBili  0.5  /  DBili  x   /  AST  16  /  ALT  37  /  AlkPhos  69  05-25          Thyroid Function Tests:  05-22 @ 07:19 TSH 0.92 FreeT4 -- T3 -- Anti TPO -- Anti Thyroglobulin Ab -- TSI --

## 2021-05-25 NOTE — PROGRESS NOTE ADULT - SUBJECTIVE AND OBJECTIVE BOX
Source of information: MILAD ORDAZ, Chart review  Patient language: English  : n/a    HPI:  Patient, 55 y old male with PMH of DM and tendon rupture presents to the ED with complaint of right hand pain. Patient reports he had surgery on his right hand 2 weeks ago at [facility] for dupuytren contracture. His doctor told him that the stitches would fall off by Monday but they did not and patient went to his doctor's office on Wed to get stitches removed. Also reports hand being inflammed. According to him, he got local anasthesia and the doctor hit his nerve?as per him. Reports having continuous severe pain in his right hand since then along with swelling. His doctor was not working so he came to the ED due to pain. He took motrin, tylenol, advil with no relief. No fever, cp, sob, vomiting.. Endorses chills and nausea. Reports pain extending from hand all the way to the shoulders, has difficulty moving his hand. Patient plays cricket and has h/o tendon rupture.  (21 May 2021 15:57)      Patient is a 55y old  Male PMH of DM and tendon rupture presents to the ED with complaint of right hand pain. Patient reports he had surgery on his right hand 2 weeks ago at [facility] for dupuytren contracture. Had stitches removed at doctor's office on Wed when MD "hit his nerve" and experienced sharp shooting pain throughout right arm.  Pt is being treated with right arm cellulitis with IV antibiotics. Doppler 5/22 negative for DVT.   Pt seen and examined at bedside. Pt sitting in chair, reports pain and swelling have improved since being admitted to hospital  Reports pain score 5/10 and tolerable SCALE USED: (1-10 VNRS). Pt describes pain as aching, throbbing, intermittent shooting pain to right shoulder, alleviated by current pain regimen, exacerbated by movement and touch. Pain and swelling of right hand improving. Pt tolerating PO diet. Denies lethargy, nausea, vomiting, constipation, itchiness. Reports last BM 5/25. Patient stated goal for pain control: to be able to take deep breaths, get out of bed to chair and ambulate with tolerable pain control. States post surgery on his right hand he was taking Tylenol, motrin and advil at home.      PAST MEDICAL & SURGICAL HISTORY:  Diabetes mellitus  type II    Hyperuricemia    Dyslipidemia    Anal fistula    Sprain, bicep    Hx of appendectomy  1987    Anal fistula  Ananl fistula repair 2013    Tendon tear, ankle, left, sequela  2016        FAMILY HISTORY:  Family history of hypertension (Mother)    Family history of diabetic complications (Mother)        Social History:  drinks occasionally  does not smoke or take other drugs (21 May 2021 15:57)   [X ] Denies illicit drug use and smoking    Allergies    Vasotec (Hives)- ithcing throat     MEDICATIONS  (STANDING):  BACItracin   Ointment 1 Application(s) Topical three times a day  cefepime   IVPB      cefepime   IVPB 1000 milliGRAM(s) IV Intermittent every 8 hours  enoxaparin Injectable 40 milliGRAM(s) SubCutaneous daily  gabapentin 100 milliGRAM(s) Oral three times a day  ibuprofen  Tablet. 400 milliGRAM(s) Oral every 8 hours  insulin glargine Injectable (LANTUS) 40 Unit(s) SubCutaneous at bedtime  insulin lispro (ADMELOG) corrective regimen sliding scale   SubCutaneous three times a day before meals  insulin lispro (ADMELOG) corrective regimen sliding scale   SubCutaneous at bedtime  insulin lispro Injectable (ADMELOG) 4 Unit(s) SubCutaneous three times a day before meals  losartan 50 milliGRAM(s) Oral daily  metoprolol succinate ER 50 milliGRAM(s) Oral daily  polyethylene glycol 3350 17 Gram(s) Oral daily  senna 2 Tablet(s) Oral at bedtime  vancomycin  IVPB 1500 milliGRAM(s) IV Intermittent every 12 hours    MEDICATIONS  (PRN):  oxycodone    5 mG/acetaminophen 325 mG 2 Tablet(s) Oral every 4 hours PRN Severe Pain (7 - 10)  oxycodone    5 mG/acetaminophen 325 mG 1 Tablet(s) Oral every 4 hours PRN Moderate Pain (4 - 6)      Vital Signs Last 24 Hrs  T(C): 37.1 (25 May 2021 05:25), Max: 37.1 (25 May 2021 05:25)  T(F): 98.8 (25 May 2021 05:25), Max: 98.8 (25 May 2021 05:25)  HR: 85 (25 May 2021 05:25) (80 - 113)  BP: 148/90 (25 May 2021 05:25) (148/90 - 210/125)  BP(mean): 154 (24 May 2021 14:27) (154 - 154)  RR: 16 (25 May 2021 05:25) (16 - 18)  SpO2: 98% (25 May 2021 05:25) (97% - 100%)  COVID-19 PCR: NotDete (21 May 2021 12:07)    LABS: Reviewed                          13.0   7.54  )-----------( 308      ( 25 May 2021 04:43 )             39.1     05-25    139  |  104  |  11  ----------------------------<  184<H>  3.7   |  26  |  0.85    Ca    8.7      25 May 2021 04:43  Phos  3.7     05-25  Mg     2.1     05-25    TPro  7.5  /  Alb  2.9<L>  /  TBili  0.5  /  DBili  x   /  AST  16  /  ALT  37  /  AlkPhos  69  05-25      LIVER FUNCTIONS - ( 25 May 2021 04:43 )  Alb: 2.9 g/dL / Pro: 7.5 g/dL / ALK PHOS: 69 U/L / ALT: 37 U/L DA / AST: 16 U/L / GGT: x             CAPILLARY BLOOD GLUCOSE      POCT Blood Glucose.: 166 mg/dL (25 May 2021 11:39)  POCT Blood Glucose.: 230 mg/dL (25 May 2021 07:52)  POCT Blood Glucose.: 223 mg/dL (24 May 2021 21:18)  POCT Blood Glucose.: 195 mg/dL (24 May 2021 16:42)    COVID-19 PCR: NotDete (21 May 2021 12:07)    Radiology: Reviewed.   < from: CT Hand w/ IV Cont, Right (05.22.21 @ 12:09) >    EXAM:  CT HAND ONLY IC RT                            PROCEDURE DATE:  05/22/2021          INTERPRETATION:  EXAMINATION:CT HAND WITH IV CONTRAST RIGHT    CLINICAL INDICATION: History of diabetes status post tendon repair 2 weeks ago, now with hand pain and swelling. Evaluate for compartment syndrome.    COMPARISON: Radiographs of the right hand dated 5/21/2021    TECHNIQUE: CT of the right hand was performed intravenous contrast: 90 mL Omnipaque 350.    INTERPRETATION:    Bones: There is no fracture. The joint spaces are maintained.    Soft Tissues: There is volar hand skin thickening centered at the third digit at the level of the MCP joint with subcutaneous edema and underlying prominence/enlargement up to 1 cm AP of the third digit flexortendon with peripheral enhancement (series 3 image 29) which may be partly the postsurgical appearance however tenosynovitis is suspected which could be infectious or inflammatory/postsurgical. There is surrounding soft tissue edema and phlegmon. There is also diffuse soft tissue edema about the hand.    Volar to the distal radius on series 7 image 35 is a localized rim-enhancing fluid collection which may represent a volar ganglion cyst measuring 1.5 cm craniocaudally.    IMPRESSION:  1.  Localized volar hand skin thickening at the level of the third digit MCP joint, presumed to be the site of recent surgery. Underlying enlargement and peripheral enhancement associated with the third digit flexor tendon. The finding may be party postsurgical, however tenosynovitis, reactive or infectious, is suspected. There is surrounding soft tissue edema and phlegmon.    MRI of the hand without and with intravenous contrast may provide a more sensitive evaluation of the soft tissues, better defined the extent of infection. This exam is recommended if there are no clinical contraindications.    Compartment syndrome should be evaluated for on a clinical basis.    2.  Volar to the distal radius is a 1.5 cm localized fluid collection, likely representing a volar ganglion cyst given this is at a distance from the surgical site, although abscess would have a similar appearance on CT.                  SHLOMIT A GOLDBERG-STEIN M.D., ATTENDING RADIOLOGIST  This document has been electronically signed. May 22 2021  1:00PM    < end of copied text >    < from: US Duplex Venous Upper Ext Ltd, Right (05.22.21 @ 12:50) >  EXAM:  US DPLX UPR EXT VEINS LTD RT                            PROCEDURE DATE:  05/22/2021          INTERPRETATION:  CLINICAL INFORMATION: Right arm pain    COMPARISON: None available.    TECHNIQUE: Duplex sonography of the RIGHT UPPER extremity veins with color and spectral Doppler, with and without compression.    FINDINGS:    The right internal jugular, subclavian, axillary and brachial veins are patent and compressible where applicable.  The basilic and cephalic veins (superficial veins) are patent and without thrombus.    Doppler examination shows normal spontaneous and phasic flow.    IMPRESSION:  No evidence of right upper extremity deep venous thrombosis.                    KAYDEN NICHOLS M.D., ATTENDING RADIOLOGIST  This document has been electronically signed. May 22 2021 12:55PM    < end of copied text >      ORT Score -   Family Hx of substance abuse	Female	      Male  Alcohol 	                                           1                     3  Illegal drugs	                                   2                     3  Rx drugs                                           4 	                  4  Personal Hx of substance abuse		  Alcohol 	                                          3	                  3  Illegal drugs                                     4	                  4  Rx drugs                                            5 	                  5  Age between 16- 45 years	           1                     1  hx preadolescent sexual abuse	   3 	                  0  Psychological disease		  ADD, OCD, bipolar, schizophrenia   2	          2  Depression                                           1 	          1  Total: 0    a score of 3 or lower indicates low risk for opioid abuse		  a score of 4-7 indicates moderate risk for opioid abuse		  a score of 8 or higher indicates high risk for opioid abuse  	  REVIEW OF SYSTEMS:  CONSTITUTIONAL: No fever or fatigue  RESPIRATORY: No cough, wheezing, chills or hemoptysis; No shortness of breath  CARDIOVASCULAR: No chest pain, palpitations, dizziness, or leg swelling  GASTROINTESTINAL: No loss of appetite, decreased PO intake. No abdominal or epigastric pain. No nausea, vomiting; No diarrhea or constipation.   GENITOURINARY: No dysuria, frequency, hematuria, retention or incontinence  MUSCULOSKELETAL: + right hand and arm pain. + moderate right hand swelling (improving); + decreased ROM of right fingers particular over left digit; no upper or lower motor strength weakness, no saddle anesthesia, bowel/bladder incontinence, no falls   NEURO: No headaches, + intermittent numbness/tingling of 5th digit of left hand, No weakness  PSYCHIATRIC: No depression, anxiety or difficulty sleeping    PHYSICAL EXAM:  GENERAL:  Alert & Oriented X4, cooperative, NAD, Good concentration. Speech is clear.   RESPIRATORY: Respirations even and unlabored. Clear to auscultation bilaterally; No rales, rhonchi, wheezing, or rubs  CARDIOVASCULAR: Normal S1/S2, regular rate and rhythm; No murmurs, rubs, or gallops. No JVD.   GASTROINTESTINAL:  Soft, Nontender, Nondistended; Bowel sounds present  PERIPHERAL VASCULAR: + right hand moderate edema, and mid forearm mild edema and mild erythema (edema and erythema improving). 2+ Peripheral Pulses, No cyanosis, No calf tenderness  MUSCULOSKELETAL: Motor Strength 5/5 B/L upper and lower extremities; moves all extremities equally against gravity; ROM right hand and fingers decreased; + limited ROM of right hand 5ht digit; negative SLR; + right hand tenderness on palpation.   SKIN: + right mid palmar wound open to air + dry yellow, and erythema     Risk factors associated with adverse outcomes related to opioid treatment  [ ]  Concurrent benzodiazepine use  [ ]  History/ Active substance use or alcohol use disorder  [ ] Psychiatric co-morbidity  [ ] Sleep apnea  [ ] COPD  [ ] BMI> 35  [ ] Liver dysfunction  [ ] Renal dysfunction  [ ] CHF  [ ] Smoker  [ ]  Age > 60 years    [X ]  [place]  Reviewed and Copied to Chart. See below.    Plan of care and goal oriented pain management treatment options were discussed with patient and /or primary care giver; all questions and concerns were addressed and care was aligned with patient's wishes.    Educated patient on goal oriented pain management treatment options     05-25-21 @ 12:52

## 2021-05-25 NOTE — CHART NOTE - NSCHARTNOTEFT_GEN_A_CORE
Paged for /113, HR 93, pt in some pain. Recheck some time after PRN pain medication shows /88, HR 91. Will give hydralazine 5mg and monitor. Paged for /113, HR 93, pt in some pain. Recheck some time after PRN pain medication shows /88, HR 91. Will give hydralazine 5mg and monitor.  ----  Pt refused Hydralazine; said it doesn't work for him. Asked to speak with MD as he is very upset regarding his frequent BP medication changes and how nobody explained his regimen to him. 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 Tried to explain that PRN medications are different from his home regimen and that oral medications must be titrated slowly but he is unwilling to listen. Amenable to taking labetalol 10mg; primary team to be aware. Paged for /113, HR 93, pt in some pain. Recheck some time after PRN pain medication shows /88, HR 91. Will give hydralazine 5mg and monitor.  ----  Pt refused Hydralazine; said it doesn't work for him. Asked to speak with MD as he is very upset regarding his frequent BP medication changes and how nobody explained his regimen to him. 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 Tried to explain that PRN medications are different from his home regimen and that oral medications must be titrated slowly but he is unwilling to listen. He wants all his BP medications to be in the same class as he is anxious about side effects. Explained that multi-class treatment, and PRN treatment is standard for HTNsive urgency but he disagrees. Amenable to taking labetalol 10mg/ only in ARB, Beta blocker classes; primary team to be aware. Paged for /113, HR 93, pt in some pain. Recheck some time after PRN pain medication shows /88, HR 91. Will give hydralazine 5mg and monitor.  ----  Pt refused Hydralazine; said it doesn't work for him. Asked to speak with MD as he is very upset regarding his frequent BP medication changes and how nobody explained his regimen to him. 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 Tried to explain that PRN medications are different from his home regimen and that oral medications must be titrated slowly but he is unwilling to listen. He wants all his BP medications to be in the same class as he is anxious about side effects. Explained that multi-class treatment, and PRN treatment is standard for HTNsive urgency but he disagrees. Amenable to taking labetalol 10mg/ only in ARB, Beta blocker classes. Was not given because BP fell to 155/99; primary team to be aware. Paged for /113, HR 93, pt in some pain. Recheck some time after PRN pain medication shows /88, HR 91. Will give hydralazine 5mg and monitor.  ----  Pt refused Hydralazine; said it doesn't work for him. Asked to speak with MD as he is very upset regarding his frequent BP medication changes and how nobody explained his regimen to him. 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 Tried to explain that PRN medications are different from his home regimen and that oral medications must be titrated slowly but he is unwilling to listen. He wants all his BP medications to be in the same class as he is anxious about side effects. Explained that multi-class treatment, and PRN treatment is standard for HTNsive urgency but he disagrees.     Amenable to taking labetalol 10mg/ only in ARB, Beta blocker classes, which was given; primary team to be aware.

## 2021-05-25 NOTE — PROGRESS NOTE ADULT - ASSESSMENT
Patient is a 55y old  Male with PMH of DM and tendon rupture, now presents to the ER for evaluation of right hand pain. He had surgery on his right hand 2 weeks ago at [facility] for Dupuytren contracture. His doctor told him that the stitches would fall off by Monday but they did not and patient went to his Surgeon's office on Wed to get stitches removed. Also reports hand being inflamed . According to him, he got local anesthesia  and the doctor hit his ? nerve. He has severe pain in his right hand since then along with swelling. His doctor was not working so he came to the ER due to pain. He took Motrin, Tylenol, Advil with no relief. He has no fever, but chills and nausea. Reports pain extending from hand all the way to the shoulders, has difficulty moving his hand. Patient plays cricket and has h/o tendon rupture. ON admission, he has no fever but Tachycardia and Leukocytosis. The XRay of right hand has not revealing. He has started on Unasyn, and the ID consult requested to assist with further evaluation and antibiotic management.     # Sepsis ( Tachycardia + Leukocytosis )- resolved  # Right hand post-op wound infection    would recommend:    1. Please obtain Vancomycin trough and adjust doses to keep level between 15 to 20  2. Continue Cefepime   3. May change to oral doxycycline 100 mg q 12hours and Augmentin 875 mg q 12hours on discharge to continue until 5/29/21  4. Pain management as needed  5. Management of Blood pressure as per Primary team     d/w Patient, House and Nursing staff     Attending Attestation:    Spent more than 35 minutes on total encounter, more than 50 % of the visit was spent counseling and/or coordinating care by the Attending physician. Patient is a 55y old  Male with PMH of DM and tendon rupture, now presents to the ER for evaluation of right hand pain. He had surgery on his right hand 2 weeks ago at [facility] for Dupuytren contracture. His doctor told him that the stitches would fall off by Monday but they did not and patient went to his Surgeon's office on Wed to get stitches removed. Also reports hand being inflamed . According to him, he got local anesthesia  and the doctor hit his ? nerve. He has severe pain in his right hand since then along with swelling. His doctor was not working so he came to the ER due to pain. He took Motrin, Tylenol, Advil with no relief. He has no fever, but chills and nausea. Reports pain extending from hand all the way to the shoulders, has difficulty moving his hand. Patient plays cricket and has h/o tendon rupture. ON admission, he has no fever but Tachycardia and Leukocytosis. The XRay of right hand has not revealing. He has started on Unasyn, and the ID consult requested to assist with further evaluation and antibiotic management.     # Sepsis ( Tachycardia + Leukocytosis )- resolved  # Right hand post-op wound infection    would recommend:    1. Please change Vancomycin to Doxycycline   2. Continue Cefepime and  May change to oral Augmentin 875 mg q 12hours on discharge to continue until 5/29/21  3. Pain management as needed  4. Management of Blood pressure as per Primary team     d/w Patient, House and Nursing staff     Attending Attestation:    Spent more than 35 minutes on total encounter, more than 50 % of the visit was spent counseling and/or coordinating care by the Attending physician.

## 2021-05-25 NOTE — CONSULT NOTE ADULT - CONSULT REASON
r/o Compartment Syndrome
uncontrolled DM
Right hand cellulitis
Right hand infection
right hand pain
Right hand numbness

## 2021-05-25 NOTE — CONSULT NOTE ADULT - SUBJECTIVE AND OBJECTIVE BOX
++++++++++++++++++NOTE NOT COMPLETED+++++++++++++++++++++++++   Patient is a 55y old  Male who presents with a chief complaint of hand pain (25 May 2021 15:11)      HPI:  Patient, 55 y old male with PMH of DM and tendon rupture presents to the ED with complaint of right hand pain. Patient reports he had surgery on his right hand 2 weeks ago at [facility] for dupuytren contracture. His doctor told him that the stitches would fall off by Monday but they did not and patient went to his doctor's office on Wed to get stitches removed. Also reports hand being inflammed. According to him, he got local anasthesia and the doctor hit his nerve?as per him. Reports having continuous severe pain in his right hand since then along with swelling. His doctor was not working so he came to the ED due to pain. He took motrin, tylenol, advil with no relief. No fever, cp, sob, vomiting.. Endorses chills and nausea. Reports pain extending from hand all the way to the shoulders, has difficulty moving his hand. Patient plays cricket and has h/o tendon rupture.  (21 May 2021 15:57)    Pt reports having right hand surgery on 5/7/21 by Dr. Angeles @ [facility].  On Wed, 5/19 pt returned to Dr. Angeles for suture removal and reports the doctor put a needle in his right wrist that caused right hand pain.  Pt reports calling Dr. Angeles on Thur, 5/20 but received no answer.  Therefore, pt presented to ED on Fri, 5/21 for right hand pain.  Pt agitated and expressed "my opinions"-voiced concerns about medical mgmt and multiple change of medications.  Reports pain, 10/10 at this time.  Reports constant pain "b/c when it wears off you'll see it, it swells up big."  Reports Toradol helped relieve pain "but they changed it."  " I don't want to become a drug addict."  Reports "partial control"-described as inability to close right hand.  Reports inability to move or feel right pinky.  Reports ability to feel and partially control 4th digit.  Unable to obtain history on remaining digits b/c pt agitated again.  Reports he's in pain, 10/10.      Neurological Review of Systems:  No difficulty with language.  No vision loss or double vision.  No dizziness, vertigo or new hearing loss.  No difficulty with speech or swallowing.  No focal weakness.  No focal sensory changes.  No numbness or tingling in the bilateral lower extremities.  No difficulty with balance.  No difficulty with ambulation.        MEDICATIONS  (STANDING):  BACItracin   Ointment 1 Application(s) Topical three times a day  cefepime   IVPB      cefepime   IVPB 1000 milliGRAM(s) IV Intermittent every 8 hours  enoxaparin Injectable 40 milliGRAM(s) SubCutaneous daily  gabapentin 200 milliGRAM(s) Oral three times a day  gabapentin 100 milliGRAM(s) Oral once  insulin glargine Injectable (LANTUS) 40 Unit(s) SubCutaneous at bedtime  insulin lispro (ADMELOG) corrective regimen sliding scale   SubCutaneous three times a day before meals  insulin lispro (ADMELOG) corrective regimen sliding scale   SubCutaneous at bedtime  insulin lispro Injectable (ADMELOG) 4 Unit(s) SubCutaneous three times a day before meals  ketorolac   Injectable 15 milliGRAM(s) IV Push every 8 hours  losartan 50 milliGRAM(s) Oral daily  metoprolol succinate ER 50 milliGRAM(s) Oral daily  polyethylene glycol 3350 17 Gram(s) Oral daily  senna 2 Tablet(s) Oral at bedtime  vancomycin  IVPB 1500 milliGRAM(s) IV Intermittent every 12 hours    MEDICATIONS  (PRN):  oxycodone    5 mG/acetaminophen 325 mG 1 Tablet(s) Oral every 4 hours PRN Severe Pain (7 - 10)    Allergies    Vasotec (Hives)    Intolerances      PAST MEDICAL & SURGICAL HISTORY:  Diabetes mellitus  type II    Hyperuricemia    Dyslipidemia    Anal fistula    Sprain, bicep    Hx of appendectomy  1987    Anal fistula  Ananl fistula repair 2013    Tendon tear, ankle, left, sequela  2016      FAMILY HISTORY:  Family history of hypertension (Mother)    Family history of diabetic complications (Mother)      SOCIAL HISTORY: non smoker    Review of Systems:    Constitutional: No generalized weakness. No fevers or chills                   Eyes, Ears, Mouth, Throat: No vision loss   Respiratory: No shortness of breath or cough                                Cardiovascular: No chest pain or palpitations  Gastrointestinal: No nausea or vomiting                                        Genitourinary: No urinary incontinence or burning on urination  Musculoskeletal: (+) Right hand pain, 10/10                                                         Dermatologic: No rash  Neurological: as per HPI                                                                      Psychiatric: No behavioral problems  Endocrine: No known hypoglycemia              Hematologic/Lymphatic: No easy bleeding    O:  Vital Signs Last 24 Hrs  T(C): 37 (25 May 2021 12:58), Max: 37.1 (25 May 2021 05:25)  T(F): 98.6 (25 May 2021 12:58), Max: 98.8 (25 May 2021 05:25)  HR: 90 (25 May 2021 12:58) (80 - 102)  BP: 157/90 (25 May 2021 12:58) (148/90 - 208/94)  RR: 18 (25 May 2021 12:58) (16 - 18)  SpO2: 96% (25 May 2021 12:58) (96% - 100%)    General Exam: Exam deferred until pain medications are given-pt reports 10/10 pain at this time.    General appearance: No acute distress                 Cardiovascular: Pedal dorsalis pulses intact bilaterally    Mental Status: Oriented to self, date and place.  Attention intact.  No dysarthria, aphasia or neglect.  Knowledge intact.  Registration intact.  Short and long term memory grossly intact.      Cranial Nerves: CN I - not tested.  PERRL, EOMI, VFF, no nystagmus or diplopia.  No APD.  Fundi not visualized.  CN V1-3 intact to light touch.  No facial asymmetry.  Hearing intact to finger rub bilaterally.  Tongue, uvula and palate midline.  Sternocleidomastoid and Trapezius intact bilaterally.    Motor:   Tone: Normal                 Strength impaired in RUE.  Intact in all other extremities.    No pronator drift bilaterally                      No dysmetria on finger-nose-finger or heel-shin-heel  No truncal ataxia.  No resting, postural or action tremor.  No myoclonus.    Sensation: Intact to light touch    Deep Tendon Reflexes: 1+ bilateral biceps, triceps, brachioradialis, knee and ankle  Toes flexor bilaterally    Gait: normal and stable.  Romberg (-).    Other:     LABS:                        13.0   7.54  )-----------( 308      ( 25 May 2021 04:43 )             39.1     05-25    139  |  104  |  11  ----------------------------<  184<H>  3.7   |  26  |  0.85    Ca    8.7      25 May 2021 04:43  Phos  3.7     05-25  Mg     2.1     05-25    TPro  7.5  /  Alb  2.9<L>  /  TBili  0.5  /  DBili  x   /  AST  16  /  ALT  37  /  AlkPhos  69  05-25            RADIOLOGY & ADDITIONAL STUDIES:  < from: CT Hand w/ IV Cont, Right (05.22.21 @ 12:09) >    EXAM:  CT HAND ONLY IC RT                            PROCEDURE DATE:  05/22/2021          INTERPRETATION:  EXAMINATION:CT HAND WITH IV CONTRAST RIGHT    CLINICAL INDICATION: History of diabetes status post tendon repair 2 weeks ago, now with hand pain and swelling. Evaluate for compartment syndrome.    COMPARISON: Radiographs of the right hand dated 5/21/2021    TECHNIQUE: CT of the right hand was performed intravenous contrast: 90 mL Omnipaque 350.    INTERPRETATION:    Bones: There is no fracture. The joint spaces are maintained.    Soft Tissues: There is volar hand skin thickening centered at the third digit at the level of the MCP joint with subcutaneous edema and underlying prominence/enlargement up to 1 cm AP of the third digit flexortendon with peripheral enhancement (series 3 image 29) which may be partly the postsurgical appearance however tenosynovitis is suspected which could be infectious or inflammatory/postsurgical. There is surrounding soft tissue edema and phlegmon. There is also diffuse soft tissue edema about the hand.    Volar to the distal radius on series 7 image 35 is a localized rim-enhancing fluid collection which may represent a volar ganglion cyst measuring 1.5 cm craniocaudally.    IMPRESSION:  1.  Localized volar hand skin thickening at the level of the third digit MCP joint, presumed to be the site of recent surgery. Underlying enlargement and peripheral enhancement associated with the third digit flexor tendon. The finding may be party postsurgical, however tenosynovitis, reactive or infectious, is suspected. There is surrounding soft tissue edema and phlegmon.    MRI of the hand without and with intravenous contrast may provide a more sensitive evaluation of the soft tissues, better defined the extent of infection. This exam is recommended if there are no clinical contraindications.    Compartment syndrome should be evaluated for on a clinical basis.    2.  Volar to the distal radius is a 1.5 cm localized fluid collection, likely representing a volar ganglion cyst given this is at a distance from the surgical site, although abscess would have a similar appearance on CT.                  SHLOMIT A GOLDBERG-STEIN M.D., ATTENDING RADIOLOGIST  This document has been electronically signed. May 22 2021  1:00PM    < end of copied text >     Patient is a 55y old  Male who presents with a chief complaint of hand pain (25 May 2021 15:11)      HPI:  Patient, 55 y old male with PMH of DM and tendon rupture presents to the ED with complaint of right hand pain. Patient reports he had surgery on his right hand 2 weeks ago at [facility] for dupuytren contracture. His doctor told him that the stitches would fall off by Monday but they did not and patient went to his doctor's office on Wed to get stitches removed. Also reports hand being inflammed. According to him, he got local anasthesia and the doctor hit his nerve?as per him. Reports having continuous severe pain in his right hand since then along with swelling. His doctor was not working so he came to the ED due to pain. He took motrin, tylenol, advil with no relief. No fever, cp, sob, vomiting.. Endorses chills and nausea. Reports pain extending from hand all the way to the shoulders, has difficulty moving his hand. Patient plays cricket and has h/o tendon rupture.  (21 May 2021 15:57)    Pt reports having right hand surgery on 5/7/21 by Dr. Angeles @ [facility].  On Wed, 5/19 pt returned to Dr. Angeles for suture removal and reports the doctor put a needle in his right wrist that caused right hand pain.  Pt reports calling Dr. Angeles on Thur, 5/20 but received no answer.  Therefore, pt presented to ED on Fri, 5/21 for right hand pain.  Pt agitated and expressed "my opinions"-voiced concerns about medical mgmt and multiple change of medications.  Reports pain, 10/10 at this time.  Reports constant pain "b/c when it wears off you'll see it, it swells up big."  Reports Toradol helped relieve pain "but they changed it."  " I don't want to become a drug addict."  Reports "partial control"-described as inability to close right hand.  Reports inability to move or feel right pinky.  Reports ability to feel and partially control 4th digit.  Unable to obtain history on remaining digits b/c pt agitated again.  Reports he's in pain, 10/10.      Neurological Review of Systems:  No difficulty with language.  No vision loss or double vision.  No dizziness, vertigo or new hearing loss.  No difficulty with speech or swallowing.  No focal weakness.  No focal sensory changes.  No numbness or tingling in the bilateral lower extremities.  No difficulty with balance.  No difficulty with ambulation.        MEDICATIONS  (STANDING):  BACItracin   Ointment 1 Application(s) Topical three times a day  cefepime   IVPB      cefepime   IVPB 1000 milliGRAM(s) IV Intermittent every 8 hours  enoxaparin Injectable 40 milliGRAM(s) SubCutaneous daily  gabapentin 200 milliGRAM(s) Oral three times a day  gabapentin 100 milliGRAM(s) Oral once  insulin glargine Injectable (LANTUS) 40 Unit(s) SubCutaneous at bedtime  insulin lispro (ADMELOG) corrective regimen sliding scale   SubCutaneous three times a day before meals  insulin lispro (ADMELOG) corrective regimen sliding scale   SubCutaneous at bedtime  insulin lispro Injectable (ADMELOG) 4 Unit(s) SubCutaneous three times a day before meals  ketorolac   Injectable 15 milliGRAM(s) IV Push every 8 hours  losartan 50 milliGRAM(s) Oral daily  metoprolol succinate ER 50 milliGRAM(s) Oral daily  polyethylene glycol 3350 17 Gram(s) Oral daily  senna 2 Tablet(s) Oral at bedtime  vancomycin  IVPB 1500 milliGRAM(s) IV Intermittent every 12 hours    MEDICATIONS  (PRN):  oxycodone    5 mG/acetaminophen 325 mG 1 Tablet(s) Oral every 4 hours PRN Severe Pain (7 - 10)    Allergies  Vasotec (Hives)      PAST MEDICAL & SURGICAL HISTORY:  Diabetes mellitus type II  Hyperuricemia  Dyslipidemia  Anal fistula, repaired in 2013  Sprain, bicep  Hx of appendectomy (1987)  Tendon tear, ankle, left, sequela, 2016      FAMILY HISTORY:  Family history of hypertension (Mother)  Family history of diabetic complications (Mother)      SOCIAL HISTORY: non smoker      Review of Systems:    Constitutional: No generalized weakness. No fevers or chills                   Eyes, Ears, Mouth, Throat: No vision loss   Respiratory: No shortness of breath or cough                                Cardiovascular: No chest pain or palpitations  Gastrointestinal: No nausea or vomiting                                        Genitourinary: No urinary incontinence or burning on urination  Musculoskeletal: (+) Right hand pain, 10/10                                                         Dermatologic: No rash  Neurological: as per HPI                                                                      Psychiatric: No behavioral problems  Endocrine: No known hypoglycemia              Hematologic/Lymphatic: No easy bleeding        O:  Vital Signs Last 24 Hrs  T(C): 37 (25 May 2021 12:58), Max: 37.1 (25 May 2021 05:25)  T(F): 98.6 (25 May 2021 12:58), Max: 98.8 (25 May 2021 05:25)  HR: 90 (25 May 2021 12:58) (80 - 102)  BP: 157/90 (25 May 2021 12:58) (148/90 - 208/94)  RR: 18 (25 May 2021 12:58) (16 - 18)  SpO2: 96% (25 May 2021 12:58) (96% - 100%)    General Exam: Exam deferred until pain medications are given-pt reports 10/10 pain at this time.    General appearance: No acute distress                 Cardiovascular: Pedal dorsalis pulses intact bilaterally    Mental Status: Oriented to self, date and place.  Attention intact.  No dysarthria, aphasia or neglect.  Knowledge intact.  Registration intact.  Short and long term memory grossly intact.      Cranial Nerves: CN I - not tested.  PERRL, EOMI, VFF, no nystagmus or diplopia.  No APD.  Fundi not visualized.  CN V1-3 intact to light touch.  No facial asymmetry.  Hearing intact to finger rub bilaterally.  Tongue, uvula and palate midline.  Sternocleidomastoid and Trapezius intact bilaterally.    Motor:   Tone: Normal x 4  Strength impaired in RUE.  Intact in all other extremities.    No pronator drift bilaterally                      No dysmetria on finger-nose-finger or heel-shin-heel b/l  No truncal ataxia.  No resting, postural or action tremor.  No myoclonus.    Sensation: Hypersensitive to light touch at right hand. Otherwise intact to light touch x 4.    Deep Tendon Reflexes: 1+ bilateral biceps, triceps, brachioradialis, knee and ankle  Toes flexor bilaterally    Gait: normal and stable.  Romberg (-).        LABS:                        13.0   7.54  )-----------( 308      ( 25 May 2021 04:43 )             39.1     05-25    139  |  104  |  11  ----------------------------<  184<H>  3.7   |  26  |  0.85    Ca    8.7      25 May 2021 04:43  Phos  3.7     05-25  Mg     2.1     05-25    TPro  7.5  /  Alb  2.9<L>  /  TBili  0.5  /  DBili  x   /  AST  16  /  ALT  37  /  AlkPhos  69  05-25            RADIOLOGY & ADDITIONAL STUDIES:  < from: CT Hand w/ IV Cont, Right (05.22.21 @ 12:09) >    EXAM:  CT HAND ONLY IC RT                            PROCEDURE DATE:  05/22/2021          INTERPRETATION:  EXAMINATION:CT HAND WITH IV CONTRAST RIGHT    CLINICAL INDICATION: History of diabetes status post tendon repair 2 weeks ago, now with hand pain and swelling. Evaluate for compartment syndrome.    COMPARISON: Radiographs of the right hand dated 5/21/2021    TECHNIQUE: CT of the right hand was performed intravenous contrast: 90 mL Omnipaque 350.    INTERPRETATION:    Bones: There is no fracture. The joint spaces are maintained.    Soft Tissues: There is volar hand skin thickening centered at the third digit at the level of the MCP joint with subcutaneous edema and underlying prominence/enlargement up to 1 cm AP of the third digit flexortendon with peripheral enhancement (series 3 image 29) which may be partly the postsurgical appearance however tenosynovitis is suspected which could be infectious or inflammatory/postsurgical. There is surrounding soft tissue edema and phlegmon. There is also diffuse soft tissue edema about the hand.    Volar to the distal radius on series 7 image 35 is a localized rim-enhancing fluid collection which may represent a volar ganglion cyst measuring 1.5 cm craniocaudally.    IMPRESSION:  1.  Localized volar hand skin thickening at the level of the third digit MCP joint, presumed to be the site of recent surgery. Underlying enlargement and peripheral enhancement associated with the third digit flexor tendon. The finding may be party postsurgical, however tenosynovitis, reactive or infectious, is suspected. There is surrounding soft tissue edema and phlegmon.    MRI of the hand without and with intravenous contrast may provide a more sensitive evaluation of the soft tissues, better defined the extent of infection. This exam is recommended if there are no clinical contraindications.    Compartment syndrome should be evaluated for on a clinical basis.    2.  Volar to the distal radius is a 1.5 cm localized fluid collection, likely representing a volar ganglion cyst given this is at a distance from the surgical site, although abscess would have a similar appearance on CT.                  SHLOMIT A GOLDBERG-LATHAM M.D., ATTENDING RADIOLOGIST  This document has been electronically signed. May 22 2021  1:00PM    < end of copied text >

## 2021-05-25 NOTE — PROGRESS NOTE ADULT - ASSESSMENT
55 y old male with PMH of DM and tendon rupture presents to the ED with complaint of right hand pain. Patient reports he had surgery on his right hand 2 weeks ago at [facility] for dupuytren contracture. Endo consulted for uncontrolled DM. A1c: 10.6. Reports non compliance with metformin and that he was trying to control his blood glucose with dietary compliance. Pt reluctant to starting insulin at this point. Pt educated about diabetes and about importance of insulin for better glucose control and also that it will help in quicker healing of his hand infection. pt agreed for long acting insulin at bedtime.        Problem/Recommendation - 1:  Problem: Uncontrolled diabetes mellitus. Recommendation:   - A1c: 10.6, Pt reports non compliance with medication, was non compliant with metformin, reports compliance with diet.   - Pt reluctant to start insulin. Pt educated at length about insulin with help with better diabetes control, which will also quicker recovery of his hand. Agreed for bedtime time insulin on discharge. Primary team to ensure insulin coverage on discharge.   - Will recommend Lantus 40 units at bedtime, humalog 4 units premeal with SS coverage pre meal and bedtime.   - Discharge medication recommendation: Lantus 40 units hs with metformin 1g BID.   - Diabetes education   - Most likely will discharge on Lantus and metformin on discharge, will follow for final recommendation  d/w hs.     Problem/Recommendation - 2:  ·  Problem: Right hand pain.  Recommendation: - antibiotics as per primary team.      Problem/Recommendation - 3:  ·  Problem: Prophylactic measure.  Recommendation: Lovenox 40 subcut daily as above.  55 y old male with PMH of DM and tendon rupture presents to the ED with complaint of right hand pain. Patient reports he had surgery on his right hand 2 weeks ago at [facility] for dupuytren contracture. Endo consulted for uncontrolled DM. A1c: 10.6. Reports non compliance with metformin and that he was trying to control his blood glucose with dietary compliance. Pt reluctant to starting insulin at this point. Pt educated about diabetes and about importance of insulin for better glucose control and also that it will help in quicker healing of his hand infection. pt agreed for long acting insulin at bedtime.        Problem/Recommendation - 1:  Problem: Uncontrolled diabetes mellitus. Recommendation:   - A1c: 10.6, Pt reports non compliance with medication, was non compliant with metformin, reports compliance with diet.   - Pt reluctant to start insulin. Pt educated at length about insulin with help with better diabetes control, which will also quicker recovery of his hand. Agreed for bedtime time insulin on discharge. Primary team to ensure insulin coverage on discharge.   - Will recommend Lantus 40 units at bedtime, humalog 4 units premeal with SS coverage pre meal and bedtime.   - Discharge medication recommendation: Lantus 40 units hs with metformin 1g BID.   - Diabetes education   - Most likely will discharge on Lantus and metformin on discharge, will follow for final recommendation  d/w hs       Problem/Recommendation - 2:  ·  Problem: Right hand pain.  Recommendation: - antibiotics as per primary team.      Problem/Recommendation - 3:  ·  Problem: Prophylactic measure.  Recommendation: Lovenox 40 subcut daily as above.

## 2021-05-25 NOTE — PROGRESS NOTE ADULT - ATTENDING COMMENTS
Seen and examined earlier . My swelling and pain  is better but have numbness little finger . Neurology consulted and outpt follow up. DC home tomorrow AM on pO Abxs.

## 2021-05-25 NOTE — PHARMACOTHERAPY INTERVENTION NOTE - COMMENTS
Morphine prn severe pain and Percocet prn severe pain (also,Percocet prn moderate pain)-discontinue Morphine prn severe pain

## 2021-05-25 NOTE — PROGRESS NOTE ADULT - SUBJECTIVE AND OBJECTIVE BOX
Patient is seen and examined at the bed side, is afebrile. The discharge has been cancelled due to high blood pressure.       REVIEW OF SYSTEMS: All other review systems are negative      ALLERGIES: Vasotec (Hives)      Vital Signs Last 24 Hrs  T(C): 37 (25 May 2021 12:58), Max: 37.1 (25 May 2021 05:25)  T(F): 98.6 (25 May 2021 12:58), Max: 98.8 (25 May 2021 05:25)  HR: 90 (25 May 2021 12:58) (85 - 90)  BP: 157/90 (25 May 2021 12:58) (148/90 - 157/90)  BP(mean): --  RR: 18 (25 May 2021 12:58) (16 - 18)  SpO2: 96% (25 May 2021 12:58) (96% - 99%)      PHYSICAL EXAM:  GENERAL: Not in distress   CHEST/LUNG: Not using accessory muscles   HEART: s1 and s2 present  ABDOMEN:  Nontender and  Nondistended  EXTREMITIES: Right hand swelling and pain improved significantly   CNS: Awake and Alert      LABS:                         13.0   7.54  )-----------( 308      ( 25 May 2021 04:43 )             39.1                           12.3   11.46 )-----------( 272      ( 22 May 2021 07:19 )             37.4                         14.0   14.95 )-----------( 282      ( 21 May 2021 11:04 )             41.5       05-    139  |  104  |  11  ----------------------------<  184<H>  3.7   |  26  |  0.85    Ca    8.7      25 May 2021 04:43  Phos  3.7       Mg     2.1         TPro  7.5  /  Alb  2.9<L>  /  TBili  0.5  /  DBili  x   /  AST  16  /  ALT  37  /  AlkPhos  69      05-24    134<L>  |  102  |  13  ----------------------------<  264<H>  4.1   |  26  |  0.90    Ca    8.7      24 May 2021 17:42    TPro  7.4  /  Alb  3.1<L>  /  TBili  0.4  /  DBili  x   /  AST  18  /  ALT  31  /  AlkPhos  64  05-24    PT/INR - ( 21 May 2021 11:04 )   PT: 13.3 sec;   INR: 1.12 ratio      PTT - ( 21 May 2021 11:04 )  PTT:27.7 sec      CAPILLARY BLOOD GLUCOSE  POCT Blood Glucose.: 245 mg/dL (21 May 2021 18:10)  POCT Blood Glucose.: 283 mg/dL (21 May 2021 13:47)      Urinalysis Basic - ( 21 May 2021 12:07 )  Color: Yellow / Appearance: Clear / S.010 / pH: x  Gluc: x / Ketone: Trace  / Bili: Negative / Urobili: Negative   Blood: x / Protein: Negative / Nitrite: Negative   Leuk Esterase: Negative / RBC: x / WBC x   Sq Epi: x / Non Sq Epi: x / Bacteria: x      Vancomycin Level, Trough (21 @ 05:20)   Vancomycin Level, Trough: 5.7      MEDICATIONS  (STANDING):    BACItracin   Ointment 1 Application(s) Topical three times a day  cefepime   IVPB      cefepime   IVPB 1000 milliGRAM(s) IV Intermittent every 8 hours  enoxaparin Injectable 40 milliGRAM(s) SubCutaneous daily  gabapentin 200 milliGRAM(s) Oral three times a day  insulin glargine Injectable (LANTUS) 40 Unit(s) SubCutaneous at bedtime  insulin lispro (ADMELOG) corrective regimen sliding scale   SubCutaneous three times a day before meals  insulin lispro (ADMELOG) corrective regimen sliding scale   SubCutaneous at bedtime  insulin lispro Injectable (ADMELOG) 4 Unit(s) SubCutaneous three times a day before meals  ketorolac   Injectable 15 milliGRAM(s) IV Push every 8 hours  losartan 50 milliGRAM(s) Oral daily  metoprolol succinate ER 50 milliGRAM(s) Oral daily  polyethylene glycol 3350 17 Gram(s) Oral daily  senna 2 Tablet(s) Oral at bedtime  vancomycin  IVPB 1500 milliGRAM(s) IV Intermittent every 12 hours        RADIOLOGY & ADDITIONAL TESTS:    21 : Xray Hand 3 Views, Right (21 @ 11:22) :  Right hand. 3 views. Patient has local pain and swelling after injection.  There is mild film IP degeneration and lesser degeneration elsewhere in the digits. No bone destruction or fracture.        MICROBIOLOGY DATA:        mCulture - Urine (21 @ 18:21)   Specimen Source: .Urine Clean Catch (Midstream)   Culture Results: No growth     Culture - Blood (21 @ 18:19)   Specimen Source: .Blood Blood-Peripheral   Culture Results: No growth to date.     Culture - Blood (21 @ 18:19)   Specimen Source: .Blood Blood-Peripheral   Culture Results: No growth to date.     COVID-19 Ambrocio Domain Antibody (21 @ 11:18)   COVID-19 Ambrocio Domain Antibody Result: >250.00:     COVID-19 PCR . (21 @ 12:07)   COVID-19 PCR: NotDetec:            Patient is seen and examined at the bed side, is afebrile. The blood pressure is better controlled.      REVIEW OF SYSTEMS: All other review systems are negative      ALLERGIES: Vasotec (Hives)      Vital Signs Last 24 Hrs  T(C): 37 (25 May 2021 12:58), Max: 37.1 (25 May 2021 05:25)  T(F): 98.6 (25 May 2021 12:58), Max: 98.8 (25 May 2021 05:25)  HR: 90 (25 May 2021 12:58) (85 - 90)  BP: 157/90 (25 May 2021 12:58) (148/90 - 157/90)  BP(mean): --  RR: 18 (25 May 2021 12:58) (16 - 18)  SpO2: 96% (25 May 2021 12:58) (96% - 99%)      PHYSICAL EXAM:  GENERAL: Not in distress   CHEST/LUNG: Not using accessory muscles   HEART: s1 and s2 present  ABDOMEN:  Nontender and  Nondistended  EXTREMITIES: Right hand swelling and pain improved significantly   CNS: Awake and Alert      LABS:                         13.0   7.54  )-----------( 308      ( 25 May 2021 04:43 )             39.1                           12.3   11.46 )-----------( 272      ( 22 May 2021 07:19 )             37.4                         14.0   14.95 )-----------( 282      ( 21 May 2021 11:04 )             41.5       05-    139  |  104  |  11  ----------------------------<  184<H>  3.7   |  26  |  0.85    Ca    8.7      25 May 2021 04:43  Phos  3.7       Mg     2.1         TPro  7.5  /  Alb  2.9<L>  /  TBili  0.5  /  DBili  x   /  AST  16  /  ALT  37  /  AlkPhos  69      05-24    134<L>  |  102  |  13  ----------------------------<  264<H>  4.1   |  26  |  0.90    Ca    8.7      24 May 2021 17:42    TPro  7.4  /  Alb  3.1<L>  /  TBili  0.4  /  DBili  x   /  AST  18  /  ALT  31  /  AlkPhos  64  05-24    PT/INR - ( 21 May 2021 11:04 )   PT: 13.3 sec;   INR: 1.12 ratio      PTT - ( 21 May 2021 11:04 )  PTT:27.7 sec      CAPILLARY BLOOD GLUCOSE  POCT Blood Glucose.: 245 mg/dL (21 May 2021 18:10)  POCT Blood Glucose.: 283 mg/dL (21 May 2021 13:47)      Urinalysis Basic - ( 21 May 2021 12:07 )  Color: Yellow / Appearance: Clear / S.010 / pH: x  Gluc: x / Ketone: Trace  / Bili: Negative / Urobili: Negative   Blood: x / Protein: Negative / Nitrite: Negative   Leuk Esterase: Negative / RBC: x / WBC x   Sq Epi: x / Non Sq Epi: x / Bacteria: x      Vancomycin Level, Trough (21 @ 05:20)   Vancomycin Level, Trough: 5.7      MEDICATIONS  (STANDING):    BACItracin   Ointment 1 Application(s) Topical three times a day  cefepime   IVPB      cefepime   IVPB 1000 milliGRAM(s) IV Intermittent every 8 hours  enoxaparin Injectable 40 milliGRAM(s) SubCutaneous daily  gabapentin 200 milliGRAM(s) Oral three times a day  insulin glargine Injectable (LANTUS) 40 Unit(s) SubCutaneous at bedtime  insulin lispro (ADMELOG) corrective regimen sliding scale   SubCutaneous three times a day before meals  insulin lispro (ADMELOG) corrective regimen sliding scale   SubCutaneous at bedtime  insulin lispro Injectable (ADMELOG) 4 Unit(s) SubCutaneous three times a day before meals  ketorolac   Injectable 15 milliGRAM(s) IV Push every 8 hours  losartan 50 milliGRAM(s) Oral daily  metoprolol succinate ER 50 milliGRAM(s) Oral daily  polyethylene glycol 3350 17 Gram(s) Oral daily  senna 2 Tablet(s) Oral at bedtime  vancomycin  IVPB 1500 milliGRAM(s) IV Intermittent every 12 hours        RADIOLOGY & ADDITIONAL TESTS:    21 : Xray Hand 3 Views, Right (21 @ 11:22) :  Right hand. 3 views. Patient has local pain and swelling after injection.  There is mild film IP degeneration and lesser degeneration elsewhere in the digits. No bone destruction or fracture.        MICROBIOLOGY DATA:        mCulture - Urine (21 @ 18:21)   Specimen Source: .Urine Clean Catch (Midstream)   Culture Results: No growth     Culture - Blood (21 @ 18:19)   Specimen Source: .Blood Blood-Peripheral   Culture Results: No growth to date.     Culture - Blood (21 @ 18:19)   Specimen Source: .Blood Blood-Peripheral   Culture Results: No growth to date.     COVID-19 Ambrocio Domain Antibody (21 @ 11:18)   COVID-19 Ambrocio Domain Antibody Result: >250.00:     COVID-19 PCR . (21 @ 12:07)   COVID-19 PCR: NotDetec:

## 2021-05-25 NOTE — CONSULT NOTE ADULT - CONSULT REQUESTED DATE/TIME
22-May-2021
25-May-2021 15:12
21-May-2021 19:12
21-May-2021 22:01
22-May-2021 08:50
24-May-2021 12:51

## 2021-05-25 NOTE — CONSULT NOTE ADULT - ASSESSMENT
56yo male w/ right pain s/p right hand surgery    Recommendations:    - Continued pain mgmt    - C/w Gabapentin for pain mgmt    - Outpt neurology f/u for possible EMG/NCV     56yo male w/ right pain s/p right hand surgery, concerning for post-operative peripheral neuropathy      Recommendations:    - Continued pain mgmt    - C/w Gabapentin for pain mgmt - 100mg PO TID - Can increase up to 200mg per dose as tolerated    - Can administer naproxen 500mg PO BID PRN breakthrough pain    - Outpt neurology f/u for possible EMG/NCV if symptoms persist

## 2021-05-25 NOTE — CHART NOTE - NSCHARTNOTEFT_GEN_A_CORE
Reassessment: Follow up with nutrition education - A1C 10.6%  Male Patient is a 55y old  Male who presents with a chief complaint of hand pain (25 May 2021 15:11)      Factors impacting intake: [ ] none [ ] nausea  [ ] vomiting [ ] diarrhea [ ] constipation  [ ]chewing problems [ ] swallowing issues  [X ] other: uncontrolled type 2 diabetes, HTN, cellulitis of right hand, sepsis     Diet Prescription: Diet, DASH/TLC:   Sodium & Cholesterol Restricted  Consistent Carbohydrate {No Snacks} (05-21-21 @ 21:09)    Intake: Patient initial assessment completed on 05/24/21 by RD noted, visited alert, reports po intake good, consuming >50% of meals & d/w Diet Tech menu choices daily, no reports of GI distress. Per pt. recently increase his intake of carbohydrate food source such as "starch vegetable - potatoes, breads & ice cream" at home, not consistent with his "diet restrictions", offered & pt. accepted nutrition eduction & copies on My Plate Planner with carbohydrate counting, reviewed & reinforced information provided, encouraged pt. f/up with PCP/Endocrinologist, stated plan to reinstate his Health Insurance since he has recently "retired" & intend to f/up with [facility] outpatient clinic as he is familiar with MD/team there. Rec. c/w diet as ordered, Endo/team following, on pain management & IV abx. tx., d/w RN. RD available.        Daily weight: 149.9Lbs   % Weight Change: stable     Pertinent Medications: MEDICATIONS  (STANDING):  BACItracin   Ointment 1 Application(s) Topical three times a day  cefepime   IVPB      cefepime   IVPB 1000 milliGRAM(s) IV Intermittent every 8 hours  enoxaparin Injectable 40 milliGRAM(s) SubCutaneous daily  gabapentin 200 milliGRAM(s) Oral three times a day  gabapentin 100 milliGRAM(s) Oral once  ibuprofen  Tablet. 400 milliGRAM(s) Oral every 8 hours  insulin glargine Injectable (LANTUS) 40 Unit(s) SubCutaneous at bedtime  insulin lispro (ADMELOG) corrective regimen sliding scale   SubCutaneous three times a day before meals  insulin lispro (ADMELOG) corrective regimen sliding scale   SubCutaneous at bedtime  insulin lispro Injectable (ADMELOG) 4 Unit(s) SubCutaneous three times a day before meals  losartan 50 milliGRAM(s) Oral daily  metoprolol succinate ER 50 milliGRAM(s) Oral daily  polyethylene glycol 3350 17 Gram(s) Oral daily  senna 2 Tablet(s) Oral at bedtime  vancomycin  IVPB 1500 milliGRAM(s) IV Intermittent every 12 hours    MEDICATIONS  (PRN):  oxycodone    5 mG/acetaminophen 325 mG 1 Tablet(s) Oral every 4 hours PRN Severe Pain (7 - 10)    Pertinent Labs: 05-25 Na139 mmol/L Glu 184 mg/dL<H> K+ 3.7 mmol/L Cr  0.85 mg/dL BUN 11 mg/dL 05-25 Phos 3.7 mg/dL 05-25 Alb 2.9 g/dL<L> 05-22 Chol 192 mg/dL LDL --    HDL 52 mg/dL Trig 227 mg/dL<H>     CAPILLARY BLOOD GLUCOSE      POCT Blood Glucose.: 166 mg/dL (25 May 2021 11:39)  POCT Blood Glucose.: 230 mg/dL (25 May 2021 07:52)  POCT Blood Glucose.: 223 mg/dL (24 May 2021 21:18)  POCT Blood Glucose.: 195 mg/dL (24 May 2021 16:42)    Skin: right hand wound care     Estimated Needs:   [X ] no change since previous assessment  [ ] recalculated:     Previous Nutrition Diagnosis:   [ ] Inadequate Energy Intake [ ]Inadequate Oral Intake [ ] Excessive Energy Intake   [ ] Underweight [ ] Increased Nutrient Needs [ ] Overweight/Obesity   [X ] Altered nutrition lab values  [ ] Unintended Weight Loss [ ] Food & Nutrition Related Knowledge Deficit [ ] Malnutrition     Nutrition Diagnosis is [X ] ongoing  [ ] resolved [ ] not applicable       Interventions: To improve understanding of therapeutic diet for blood glucose control  Recommend  [ ] Change Diet To:  [X ] Nutrition Supplement: add MVI/minerals daily as medically feasible   [ ] Nutrition Support  [ X] Other: Nursing to continue meal set up and encouragement    Monitoring and Evaluation:   [X ] PO intake [ x ] Tolerance to diet prescription [ x ] weights [ x ] labs[ x ] follow up per protocol  [ ] other: Reassessment: Follow up with nutrition education - A1C 10.6%  Male Patient is a 55y old  Male who presents with a chief complaint of hand pain (25 May 2021 15:11)      Factors impacting intake: [ ] none [ ] nausea  [ ] vomiting [ ] diarrhea [ ] constipation  [ ]chewing problems [ ] swallowing issues  [X ] other: uncontrolled type 2 diabetes, HTN, cellulitis of right hand, sepsis     Diet Prescription: Diet, DASH/TLC:   Sodium & Cholesterol Restricted  Consistent Carbohydrate {No Snacks} (05-21-21 @ 21:09)    Intake: Patient initial assessment completed on 05/24/21 by RD noted, visited alert, reports po intake good, consuming >50% of meals & d/w Diet Tech menu choices daily, no reports of GI distress. Per pt. recently increase his intake of carbohydrate food sources such as "starch vegetable - potatoes, breads & ice cream" at home, not consistent with his "diet restrictions", offered & pt. accepted nutrition eduction & copies on My Plate Planner with carbohydrate counting, reviewed & reinforced information provided, encouraged pt. f/up with PCP/Endocrinologist, stated plan to reinstate his Health Insurance since he has recently "retired" & intend to f/up with [facility] outpatient clinic as he is familiar with MD/team there. Rec. c/w diet as ordered, Endo/team following, on pain management & IV abx. tx., d/w RN. RD available.        Daily weight: 149.9Lbs   % Weight Change: stable     Pertinent Medications: MEDICATIONS  (STANDING):  BACItracin   Ointment 1 Application(s) Topical three times a day  cefepime   IVPB      cefepime   IVPB 1000 milliGRAM(s) IV Intermittent every 8 hours  enoxaparin Injectable 40 milliGRAM(s) SubCutaneous daily  gabapentin 200 milliGRAM(s) Oral three times a day  gabapentin 100 milliGRAM(s) Oral once  ibuprofen  Tablet. 400 milliGRAM(s) Oral every 8 hours  insulin glargine Injectable (LANTUS) 40 Unit(s) SubCutaneous at bedtime  insulin lispro (ADMELOG) corrective regimen sliding scale   SubCutaneous three times a day before meals  insulin lispro (ADMELOG) corrective regimen sliding scale   SubCutaneous at bedtime  insulin lispro Injectable (ADMELOG) 4 Unit(s) SubCutaneous three times a day before meals  losartan 50 milliGRAM(s) Oral daily  metoprolol succinate ER 50 milliGRAM(s) Oral daily  polyethylene glycol 3350 17 Gram(s) Oral daily  senna 2 Tablet(s) Oral at bedtime  vancomycin  IVPB 1500 milliGRAM(s) IV Intermittent every 12 hours    MEDICATIONS  (PRN):  oxycodone    5 mG/acetaminophen 325 mG 1 Tablet(s) Oral every 4 hours PRN Severe Pain (7 - 10)    Pertinent Labs: 05-25 Na139 mmol/L Glu 184 mg/dL<H> K+ 3.7 mmol/L Cr  0.85 mg/dL BUN 11 mg/dL 05-25 Phos 3.7 mg/dL 05-25 Alb 2.9 g/dL<L> 05-22 Chol 192 mg/dL LDL --    HDL 52 mg/dL Trig 227 mg/dL<H>     CAPILLARY BLOOD GLUCOSE      POCT Blood Glucose.: 166 mg/dL (25 May 2021 11:39)  POCT Blood Glucose.: 230 mg/dL (25 May 2021 07:52)  POCT Blood Glucose.: 223 mg/dL (24 May 2021 21:18)  POCT Blood Glucose.: 195 mg/dL (24 May 2021 16:42)    Skin: right hand wound care     Estimated Needs:   [X ] no change since previous assessment  [ ] recalculated:     Previous Nutrition Diagnosis:   [ ] Inadequate Energy Intake [ ]Inadequate Oral Intake [ ] Excessive Energy Intake   [ ] Underweight [ ] Increased Nutrient Needs [ ] Overweight/Obesity   [X ] Altered nutrition lab values  [ ] Unintended Weight Loss [ ] Food & Nutrition Related Knowledge Deficit [ ] Malnutrition     Nutrition Diagnosis is [X ] ongoing  [ ] resolved [ ] not applicable       Interventions: To improve understanding of therapeutic diet for blood glucose control  Recommend  [ ] Change Diet To:  [X ] Nutrition Supplement: add MVI/minerals daily as medically feasible   [ ] Nutrition Support  [ X] Other: Nursing to continue meal set up and encouragement    Monitoring and Evaluation:   [X ] PO intake [ x ] Tolerance to diet prescription [ x ] weights [ x ] labs[ x ] follow up per protocol  [ ] other:

## 2021-05-25 NOTE — PROGRESS NOTE ADULT - SUBJECTIVE AND OBJECTIVE BOX
PGY-1 Progress Note discussed with attending    PAGER #: [[phone]] TILL 5:00 PM  PLEASE CONTACT ON CALL TEAM:  - On Call Team (Please refer to Mario) FROM 5:00 PM - 8:30PM  - Nightfloat Team FROM 8:30 -7:30 AM      INTERVAL HPI/OVERNIGHT EVENTS: Endorses improvement in pain, assessed by pain management team and neuro. Will continue Toradol overnight and dc in am.     MEDICATIONS  (STANDING):  BACItracin   Ointment 1 Application(s) Topical three times a day  cefepime   IVPB      cefepime   IVPB 1000 milliGRAM(s) IV Intermittent every 8 hours  enoxaparin Injectable 40 milliGRAM(s) SubCutaneous daily  insulin glargine Injectable (LANTUS) 40 Unit(s) SubCutaneous at bedtime  insulin lispro (ADMELOG) corrective regimen sliding scale   SubCutaneous at bedtime  insulin lispro (ADMELOG) corrective regimen sliding scale   SubCutaneous three times a day before meals  insulin lispro Injectable (ADMELOG) 4 Unit(s) SubCutaneous three times a day before meals  losartan 50 milliGRAM(s) Oral daily  polyethylene glycol 3350 17 Gram(s) Oral daily  senna 2 Tablet(s) Oral at bedtime  vancomycin  IVPB 1250 milliGRAM(s) IV Intermittent every 12 hours    MEDICATIONS  (PRN):  morphine  - Injectable 2 milliGRAM(s) IV Push every 6 hours PRN Severe Pain (7 - 10)  oxycodone    5 mG/acetaminophen 325 mG 2 Tablet(s) Oral every 4 hours PRN Severe Pain (7 - 10)  oxycodone    5 mG/acetaminophen 325 mG 1 Tablet(s) Oral every 4 hours PRN Moderate Pain (4 - 6)    REVIEW OF SYSTEMS:  CONSTITUTIONAL: No fever or fatigue  RESPIRATORY: No cough, no SOB  CARDIOVASCULAR: No CP, no dizziness  GASTROINTESTINAL: no abdominal pain, no n/v/d/c  GENITOURINARY: No dysuria or hematuria  NEUROLOGICAL: No headaches  SKIN: No itching or lesions   EXT: pain and swelling in r arm, waxing/waning  all other systems reviewed and are negative     Vital Signs Last 24 Hrs  T(C): 37 (25 May 2021 12:58), Max: 37.1 (25 May 2021 05:25)  T(F): 98.6 (25 May 2021 12:58), Max: 98.8 (25 May 2021 05:25)  HR: 90 (25 May 2021 12:58) (80 - 102)  BP: 157/90 (25 May 2021 12:58) (148/90 - 194/115)  BP(mean): --  RR: 18 (25 May 2021 12:58) (16 - 18)  SpO2: 96% (25 May 2021 12:58) (96% - 100%)    PHYSICAL EXAMINATION:  GENERAL: NAD, well built  HEAD:  Atraumatic, Normocephalic  EYES:  conjunctiva and sclera clear  NECK: Supple  CHEST/LUNG: CTA b/l, no increased WOB  HEART: RRR  ABDOMEN: soft, NTND, +BS  NERVOUS SYSTEM: alert and oriented x3  EXTREMITIES:  r hand swollen to wrist, scar noted in palm, tender to palpation   SKIN: warm dry                          12.9   8.03  )-----------( 295      ( 24 May 2021 08:40 )             38.5     05-24    132<L>  |  99  |  13  ----------------------------<  401<H>  3.9   |  23  |  0.93    Ca    8.6      24 May 2021 08:40                CAPILLARY BLOOD GLUCOSE      RADIOLOGY & ADDITIONAL TESTS:

## 2021-05-26 ENCOUNTER — TRANSCRIPTION ENCOUNTER (OUTPATIENT)
Age: 56
End: 2021-05-26

## 2021-05-26 LAB
ALBUMIN SERPL ELPH-MCNC: 2.8 G/DL — LOW (ref 3.5–5)
ALP SERPL-CCNC: 66 U/L — SIGNIFICANT CHANGE UP (ref 40–120)
ALT FLD-CCNC: 44 U/L DA — SIGNIFICANT CHANGE UP (ref 10–60)
ANION GAP SERPL CALC-SCNC: 11 MMOL/L — SIGNIFICANT CHANGE UP (ref 5–17)
AST SERPL-CCNC: 15 U/L — SIGNIFICANT CHANGE UP (ref 10–40)
BILIRUB SERPL-MCNC: 0.4 MG/DL — SIGNIFICANT CHANGE UP (ref 0.2–1.2)
BUN SERPL-MCNC: 14 MG/DL — SIGNIFICANT CHANGE UP (ref 7–18)
CALCIUM SERPL-MCNC: 9 MG/DL — SIGNIFICANT CHANGE UP (ref 8.4–10.5)
CHLORIDE SERPL-SCNC: 104 MMOL/L — SIGNIFICANT CHANGE UP (ref 96–108)
CO2 SERPL-SCNC: 24 MMOL/L — SIGNIFICANT CHANGE UP (ref 22–31)
CREAT SERPL-MCNC: 0.79 MG/DL — SIGNIFICANT CHANGE UP (ref 0.5–1.3)
CULTURE RESULTS: SIGNIFICANT CHANGE UP
CULTURE RESULTS: SIGNIFICANT CHANGE UP
GLUCOSE BLDC GLUCOMTR-MCNC: 140 MG/DL — HIGH (ref 70–99)
GLUCOSE BLDC GLUCOMTR-MCNC: 154 MG/DL — HIGH (ref 70–99)
GLUCOSE BLDC GLUCOMTR-MCNC: 157 MG/DL — HIGH (ref 70–99)
GLUCOSE BLDC GLUCOMTR-MCNC: 236 MG/DL — HIGH (ref 70–99)
GLUCOSE SERPL-MCNC: 162 MG/DL — HIGH (ref 70–99)
HCT VFR BLD CALC: 39 % — SIGNIFICANT CHANGE UP (ref 39–50)
HGB BLD-MCNC: 13 G/DL — SIGNIFICANT CHANGE UP (ref 13–17)
MAGNESIUM SERPL-MCNC: 2.3 MG/DL — SIGNIFICANT CHANGE UP (ref 1.6–2.6)
MCHC RBC-ENTMCNC: 27.9 PG — SIGNIFICANT CHANGE UP (ref 27–34)
MCHC RBC-ENTMCNC: 33.3 GM/DL — SIGNIFICANT CHANGE UP (ref 32–36)
MCV RBC AUTO: 83.7 FL — SIGNIFICANT CHANGE UP (ref 80–100)
NRBC # BLD: 0 /100 WBCS — SIGNIFICANT CHANGE UP (ref 0–0)
PHOSPHATE SERPL-MCNC: 4 MG/DL — SIGNIFICANT CHANGE UP (ref 2.5–4.5)
PLATELET # BLD AUTO: 320 K/UL — SIGNIFICANT CHANGE UP (ref 150–400)
POTASSIUM SERPL-MCNC: 3.9 MMOL/L — SIGNIFICANT CHANGE UP (ref 3.5–5.3)
POTASSIUM SERPL-SCNC: 3.9 MMOL/L — SIGNIFICANT CHANGE UP (ref 3.5–5.3)
PROT SERPL-MCNC: 7.4 G/DL — SIGNIFICANT CHANGE UP (ref 6–8.3)
RBC # BLD: 4.66 M/UL — SIGNIFICANT CHANGE UP (ref 4.2–5.8)
RBC # FLD: 12.3 % — SIGNIFICANT CHANGE UP (ref 10.3–14.5)
SODIUM SERPL-SCNC: 139 MMOL/L — SIGNIFICANT CHANGE UP (ref 135–145)
SPECIMEN SOURCE: SIGNIFICANT CHANGE UP
SPECIMEN SOURCE: SIGNIFICANT CHANGE UP
WBC # BLD: 9.85 K/UL — SIGNIFICANT CHANGE UP (ref 3.8–10.5)
WBC # FLD AUTO: 9.85 K/UL — SIGNIFICANT CHANGE UP (ref 3.8–10.5)

## 2021-05-26 RX ORDER — INSULIN GLARGINE 100 [IU]/ML
12 INJECTION, SOLUTION SUBCUTANEOUS
Qty: 0 | Refills: 0 | DISCHARGE
Start: 2021-05-26 | End: 2021-06-24

## 2021-05-26 RX ORDER — LABETALOL HCL 100 MG
10 TABLET ORAL ONCE
Refills: 0 | Status: COMPLETED | OUTPATIENT
Start: 2021-05-26 | End: 2021-05-26

## 2021-05-26 RX ORDER — GABAPENTIN 400 MG/1
1 CAPSULE ORAL
Qty: 90 | Refills: 0
Start: 2021-05-26 | End: 2021-06-24

## 2021-05-26 RX ORDER — BACITRACIN ZINC 500 UNIT/G
1 OINTMENT IN PACKET (EA) TOPICAL
Qty: 1 | Refills: 0
Start: 2021-05-26 | End: 2021-06-01

## 2021-05-26 RX ORDER — LOSARTAN POTASSIUM 100 MG/1
25 TABLET, FILM COATED ORAL ONCE
Refills: 0 | Status: COMPLETED | OUTPATIENT
Start: 2021-05-26 | End: 2021-05-26

## 2021-05-26 RX ORDER — SENNA PLUS 8.6 MG/1
2 TABLET ORAL
Qty: 28 | Refills: 0
Start: 2021-05-26 | End: 2021-06-08

## 2021-05-26 RX ORDER — INSULIN GLARGINE 100 [IU]/ML
40 INJECTION, SOLUTION SUBCUTANEOUS
Qty: 15 | Refills: 0
Start: 2021-05-26 | End: 2021-06-24

## 2021-05-26 RX ORDER — KETOROLAC TROMETHAMINE 30 MG/ML
15 SYRINGE (ML) INJECTION ONCE
Refills: 0 | Status: DISCONTINUED | OUTPATIENT
Start: 2021-05-26 | End: 2021-05-26

## 2021-05-26 RX ORDER — LOSARTAN POTASSIUM 100 MG/1
100 TABLET, FILM COATED ORAL DAILY
Refills: 0 | Status: DISCONTINUED | OUTPATIENT
Start: 2021-05-27 | End: 2021-05-27

## 2021-05-26 RX ORDER — DIPHENHYDRAMINE HCL 50 MG
25 CAPSULE ORAL ONCE
Refills: 0 | Status: COMPLETED | OUTPATIENT
Start: 2021-05-26 | End: 2021-05-26

## 2021-05-26 RX ORDER — KETOROLAC TROMETHAMINE 30 MG/ML
15 SYRINGE (ML) INJECTION EVERY 6 HOURS
Refills: 0 | Status: DISCONTINUED | OUTPATIENT
Start: 2021-05-26 | End: 2021-05-27

## 2021-05-26 RX ORDER — METOPROLOL TARTRATE 50 MG
1 TABLET ORAL
Qty: 30 | Refills: 0
Start: 2021-05-26 | End: 2021-06-24

## 2021-05-26 RX ORDER — LOSARTAN POTASSIUM 100 MG/1
1 TABLET, FILM COATED ORAL
Qty: 30 | Refills: 0
Start: 2021-05-26 | End: 2021-06-24

## 2021-05-26 RX ORDER — LOSARTAN POTASSIUM 100 MG/1
25 TABLET, FILM COATED ORAL DAILY
Refills: 0 | Status: DISCONTINUED | OUTPATIENT
Start: 2021-05-26 | End: 2021-05-26

## 2021-05-26 RX ORDER — DICLOFENAC SODIUM 75 MG/1
1 TABLET, DELAYED RELEASE ORAL
Qty: 30 | Refills: 0
Start: 2021-05-26 | End: 2021-06-04

## 2021-05-26 RX ADMIN — Medication 25 MILLIGRAM(S): at 23:49

## 2021-05-26 RX ADMIN — LOSARTAN POTASSIUM 25 MILLIGRAM(S): 100 TABLET, FILM COATED ORAL at 19:47

## 2021-05-26 RX ADMIN — Medication 15 MILLIGRAM(S): at 22:41

## 2021-05-26 RX ADMIN — Medication 15 MILLIGRAM(S): at 22:11

## 2021-05-26 RX ADMIN — Medication 2: at 08:21

## 2021-05-26 RX ADMIN — CEFEPIME 100 MILLIGRAM(S): 1 INJECTION, POWDER, FOR SOLUTION INTRAMUSCULAR; INTRAVENOUS at 06:24

## 2021-05-26 RX ADMIN — OXYCODONE AND ACETAMINOPHEN 1 TABLET(S): 5; 325 TABLET ORAL at 04:11

## 2021-05-26 RX ADMIN — Medication 1: at 16:27

## 2021-05-26 RX ADMIN — Medication 15 MILLIGRAM(S): at 06:42

## 2021-05-26 RX ADMIN — Medication 1 APPLICATION(S): at 22:06

## 2021-05-26 RX ADMIN — Medication 300 MILLIGRAM(S): at 06:24

## 2021-05-26 RX ADMIN — Medication 15 MILLIGRAM(S): at 19:45

## 2021-05-26 RX ADMIN — Medication 1 APPLICATION(S): at 13:17

## 2021-05-26 RX ADMIN — Medication 50 MILLIGRAM(S): at 06:25

## 2021-05-26 RX ADMIN — GABAPENTIN 200 MILLIGRAM(S): 400 CAPSULE ORAL at 22:07

## 2021-05-26 RX ADMIN — Medication 4 UNIT(S): at 16:27

## 2021-05-26 RX ADMIN — Medication 15 MILLIGRAM(S): at 07:04

## 2021-05-26 RX ADMIN — GABAPENTIN 200 MILLIGRAM(S): 400 CAPSULE ORAL at 06:24

## 2021-05-26 RX ADMIN — Medication 4 UNIT(S): at 08:21

## 2021-05-26 RX ADMIN — Medication 1 TABLET(S): at 22:11

## 2021-05-26 RX ADMIN — Medication 15 MILLIGRAM(S): at 19:15

## 2021-05-26 RX ADMIN — OXYCODONE AND ACETAMINOPHEN 1 TABLET(S): 5; 325 TABLET ORAL at 13:16

## 2021-05-26 RX ADMIN — Medication 1 APPLICATION(S): at 06:25

## 2021-05-26 RX ADMIN — OXYCODONE AND ACETAMINOPHEN 1 TABLET(S): 5; 325 TABLET ORAL at 05:00

## 2021-05-26 RX ADMIN — Medication 4 UNIT(S): at 12:02

## 2021-05-26 RX ADMIN — LOSARTAN POTASSIUM 25 MILLIGRAM(S): 100 TABLET, FILM COATED ORAL at 14:11

## 2021-05-26 RX ADMIN — Medication 10 MILLIGRAM(S): at 16:22

## 2021-05-26 RX ADMIN — INSULIN GLARGINE 40 UNIT(S): 100 INJECTION, SOLUTION SUBCUTANEOUS at 22:07

## 2021-05-26 RX ADMIN — LOSARTAN POTASSIUM 50 MILLIGRAM(S): 100 TABLET, FILM COATED ORAL at 06:25

## 2021-05-26 RX ADMIN — GABAPENTIN 200 MILLIGRAM(S): 400 CAPSULE ORAL at 13:16

## 2021-05-26 RX ADMIN — Medication 100 MILLIGRAM(S): at 17:23

## 2021-05-26 RX ADMIN — OXYCODONE AND ACETAMINOPHEN 1 TABLET(S): 5; 325 TABLET ORAL at 14:15

## 2021-05-26 NOTE — PROGRESS NOTE ADULT - SUBJECTIVE AND OBJECTIVE BOX
PGY-1 Progress Note discussed with attending    PAGER #: [[phone]] TILL 5:00 PM  PLEASE CONTACT ON CALL TEAM:  - On Call Team (Please refer to Mario) FROM 5:00 PM - 8:30PM  - Nightfloat Team FROM 8:30 -7:30 AM      INTERVAL HPI/OVERNIGHT EVENTS: Endorses improvement in pain, assessed by pain management team and neuro with instructions to follow up. Intended for dc today, but given elevated BP will increase losartan regimen and dc tomorrow if improved.     MEDICATIONS  (STANDING):  BACItracin   Ointment 1 Application(s) Topical three times a day  cefepime   IVPB      cefepime   IVPB 1000 milliGRAM(s) IV Intermittent every 8 hours  enoxaparin Injectable 40 milliGRAM(s) SubCutaneous daily  insulin glargine Injectable (LANTUS) 40 Unit(s) SubCutaneous at bedtime  insulin lispro (ADMELOG) corrective regimen sliding scale   SubCutaneous at bedtime  insulin lispro (ADMELOG) corrective regimen sliding scale   SubCutaneous three times a day before meals  insulin lispro Injectable (ADMELOG) 4 Unit(s) SubCutaneous three times a day before meals  losartan 50 milliGRAM(s) Oral daily  polyethylene glycol 3350 17 Gram(s) Oral daily  senna 2 Tablet(s) Oral at bedtime  vancomycin  IVPB 1250 milliGRAM(s) IV Intermittent every 12 hours    MEDICATIONS  (PRN):  morphine  - Injectable 2 milliGRAM(s) IV Push every 6 hours PRN Severe Pain (7 - 10)  oxycodone    5 mG/acetaminophen 325 mG 2 Tablet(s) Oral every 4 hours PRN Severe Pain (7 - 10)  oxycodone    5 mG/acetaminophen 325 mG 1 Tablet(s) Oral every 4 hours PRN Moderate Pain (4 - 6)    REVIEW OF SYSTEMS:  CONSTITUTIONAL: No fever or fatigue  RESPIRATORY: No cough, no SOB  CARDIOVASCULAR: No CP, no dizziness  GASTROINTESTINAL: no abdominal pain, no n/v/d/c  GENITOURINARY: No dysuria or hematuria  NEUROLOGICAL: No headaches  SKIN: No itching or lesions   EXT: pain and swelling in r arm, waxing/waning  all other systems reviewed and are negative     Vital Signs Last 24 Hrs  T(C): 36.7 (26 May 2021 15:24), Max: 37.1 (25 May 2021 21:03)  T(F): 98 (26 May 2021 15:24), Max: 98.8 (25 May 2021 21:03)  HR: 94 (26 May 2021 16:51) (80 - 108)  BP: 167/93 (26 May 2021 16:51) (132/82 - 187/113)  BP(mean): --  RR: 18 (26 May 2021 16:51) (17 - 18)  SpO2: 99% (26 May 2021 16:51) (97% - 99%)    PHYSICAL EXAMINATION:  GENERAL: NAD, well built  HEAD:  Atraumatic, Normocephalic  EYES:  conjunctiva and sclera clear  NECK: Supple  CHEST/LUNG: CTA b/l, no increased WOB  HEART: RRR  ABDOMEN: soft, NTND, +BS  NERVOUS SYSTEM: alert and oriented x3  EXTREMITIES:  r hand swollen to wrist, scar noted in palm, tender to palpation   SKIN: warm dry                          12.9   8.03  )-----------( 295      ( 24 May 2021 08:40 )             38.5     05-24    132<L>  |  99  |  13  ----------------------------<  401<H>  3.9   |  23  |  0.93    Ca    8.6      24 May 2021 08:40                CAPILLARY BLOOD GLUCOSE      RADIOLOGY & ADDITIONAL TESTS:

## 2021-05-26 NOTE — PROGRESS NOTE ADULT - PROBLEM SELECTOR PLAN 1
Pt with right hand pain which is somatic and neuropathic in nature due to right hand cellulitis s/p removal of stiches from recent tendon repair.  Pt with Dupuytren's contracture and underwent surgery on 5/7.  Pt to be dc on po abx.  Opioid pain recommendations   - Percocet 5-325mg 1  tabs PO q 4 hours PRN severe pain. Monitor for sedation/ respiratory depression.   Non-opioid pain recommendations   - Gabapentin 300mg po q 8 hours - dc on this regimen  - spoke to pt - will do diclofenac 25-50mg po q 8 hours prn   Bowel Regimen  - Continue Miralax 17G PO daily  - Continue Senna 2 tablets at bedtime for constipation  Mild pain   - Non-pharmacological pain treatment recommendations  - Warm/ Cool packs PRN   - Repositioning extremity, elevation, imagery, relaxation, distraction.  - Physical therapy OOB if no contraindications   Recommendations discussed with primary team and RN.

## 2021-05-26 NOTE — DISCHARGE NOTE PROVIDER - CARE PROVIDERS DIRECT ADDRESSES
,DirectAddress_Unknown,[email or web address] ,DirectAddress_Unknown,[email or web address],[email or web address]

## 2021-05-26 NOTE — PROGRESS NOTE ADULT - SUBJECTIVE AND OBJECTIVE BOX
Interval Events:      Allergies    Vasotec (Hives)    Intolerances      Endocrine/Metabolic Medications:  insulin glargine Injectable (LANTUS) 40 Unit(s) SubCutaneous at bedtime  insulin lispro (ADMELOG) corrective regimen sliding scale   SubCutaneous three times a day before meals  insulin lispro (ADMELOG) corrective regimen sliding scale   SubCutaneous at bedtime  insulin lispro Injectable (ADMELOG) 4 Unit(s) SubCutaneous three times a day before meals      Vital Signs Last 24 Hrs  T(C): 36.8 (26 May 2021 05:05), Max: 37.1 (25 May 2021 21:03)  T(F): 98.3 (26 May 2021 05:05), Max: 98.8 (25 May 2021 21:03)  HR: 92 (26 May 2021 05:05) (80 - 105)  BP: 132/82 (26 May 2021 05:05) (132/82 - 187/113)  BP(mean): --  RR: 17 (26 May 2021 05:05) (17 - 18)  SpO2: 97% (26 May 2021 05:05) (96% - 99%)      PHYSICAL EXAM  All physical exam findings normal, except those marked:  General:	Alert, active, cooperative, NAD, well hydrated  .		[] Abnormal:  Neck		Normal: supple, no cervical adenopathy, no palpable thyroid  .		[] Abnormal:  Cardiovascular	Normal: regular rate, normal S1, S2, no murmurs  .		[] Abnormal:  Respiratory	Normal: no chest wall deformity, normal respiratory pattern, CTA B/L  .		[] Abnormal:  Abdominal	Normal: soft, ND, NT, bowel sounds present, no masses, no organomegaly  .		[] Abnormal:  		Normal normal genitalia, testes descended, circumcised/uncircumcised  .		Mala stage:			Breast mala:  .		Menstrual history:  .		[] Abnormal:  Extremities	Normal: FROM x4  .		[] Abnormal:  Skin		Normal: intact and not indurated, no rash, no acanthosis nigricans  .		[] Abnormal:  Neurologic	Normal: grossly intact  .		[] Abnormal:    LABS                        13.0   9.85  )-----------( 320      ( 26 May 2021 06:22 )             39.0                               139    |  104    |  14                  Calcium: 9.0   / iCa: x      (05-26 @ 06:22)    ----------------------------<  162       Magnesium: 2.3                              3.9     |  24     |  0.79             Phosphorous: 4.0      TPro  7.4    /  Alb  2.8    /  TBili  0.4    /  DBili  x      /  AST  15     /  ALT  44     /  AlkPhos  66     26 May 2021 06:22    CAPILLARY BLOOD GLUCOSE      POCT Blood Glucose.: 236 mg/dL (26 May 2021 07:47)  POCT Blood Glucose.: 213 mg/dL (25 May 2021 21:10)  POCT Blood Glucose.: 183 mg/dL (25 May 2021 16:29)  POCT Blood Glucose.: 166 mg/dL (25 May 2021 11:39)        Assesment/plan       Interval Events:  pt in nad    Allergies    Vasotec (Hives)    Intolerances      Endocrine/Metabolic Medications:  insulin glargine Injectable (LANTUS) 40 Unit(s) SubCutaneous at bedtime  insulin lispro (ADMELOG) corrective regimen sliding scale   SubCutaneous three times a day before meals  insulin lispro (ADMELOG) corrective regimen sliding scale   SubCutaneous at bedtime  insulin lispro Injectable (ADMELOG) 4 Unit(s) SubCutaneous three times a day before meals      Vital Signs Last 24 Hrs  T(C): 36.8 (26 May 2021 05:05), Max: 37.1 (25 May 2021 21:03)  T(F): 98.3 (26 May 2021 05:05), Max: 98.8 (25 May 2021 21:03)  HR: 92 (26 May 2021 05:05) (80 - 105)  BP: 132/82 (26 May 2021 05:05) (132/82 - 187/113)  BP(mean): --  RR: 17 (26 May 2021 05:05) (17 - 18)  SpO2: 97% (26 May 2021 05:05) (96% - 99%)      PHYSICAL EXAM  All physical exam findings normal, except those marked:  General:	Alert, active, cooperative, NAD, well hydrated  .		[] Abnormal:  Neck		Normal: supple, no cervical adenopathy, no palpable thyroid  .		[] Abnormal:  Cardiovascular	Normal: regular rate, normal S1, S2, no murmurs  .		[] Abnormal:  Respiratory	Normal: no chest wall deformity, normal respiratory pattern, CTA B/L  .		[] Abnormal:  Abdominal	Normal: soft, ND, NT, bowel sounds present, no masses, no organomegaly  .		[] Abnormal:  		Normal normal genitalia, testes descended, circumcised/uncircumcised  .		Mala stage:			Breast mala:  .		Menstrual history:  .		[] Abnormal:  Extremities	Normal: FROM x4  .		[] Abnormal:  Skin		Normal: intact and not indurated, no rash, no acanthosis nigricans  .		[] Abnormal:  Neurologic	Normal: grossly intact  .		[] Abnormal:    LABS                        13.0   9.85  )-----------( 320      ( 26 May 2021 06:22 )             39.0                               139    |  104    |  14                  Calcium: 9.0   / iCa: x      (05-26 @ 06:22)    ----------------------------<  162       Magnesium: 2.3                              3.9     |  24     |  0.79             Phosphorous: 4.0      TPro  7.4    /  Alb  2.8    /  TBili  0.4    /  DBili  x      /  AST  15     /  ALT  44     /  AlkPhos  66     26 May 2021 06:22    CAPILLARY BLOOD GLUCOSE      POCT Blood Glucose.: 236 mg/dL (26 May 2021 07:47)  POCT Blood Glucose.: 213 mg/dL (25 May 2021 21:10)  POCT Blood Glucose.: 183 mg/dL (25 May 2021 16:29)  POCT Blood Glucose.: 166 mg/dL (25 May 2021 11:39)        Assesment/plan    55 y old male with PMH of DM and tendon rupture presents to the ED with complaint of right hand pain. Patient reports he had surgery on his right hand 2 weeks ago at [facility] for dupuytren contracture. Endo consulted for uncontrolled DM. A1c: 10.6. Reports non compliance with metformin and that he was trying to control his blood glucose with dietary compliance. Pt reluctant to starting insulin at this point. Pt educated about diabetes and about importance of insulin for better glucose control and also that it will help in quicker healing of his hand infection. pt agreed for long acting insulin at bedtime.        Problem/Recommendation - 1:  Problem: Uncontrolled diabetes mellitus. Recommendation: better controlled on insulin   - A1c: 10.6, Pt reports non compliance with medication, was non compliant with metformin, reports compliance with diet.   - Pt reluctant to start insulin. Pt educated at length about insulin with help with better diabetes control, which will also quicker recovery of his hand. Agreed for bedtime time insulin on discharge. Primary team to ensure insulin coverage on discharge.   - Will recommend Lantus 40 units at bedtime, humalog 4 units premeal with SS coverage pre meal and bedtime.   - Discharge medication recommendation: Lantus 40 units hs with metformin 1g BID.   - Diabetes education   - Most likely will discharge on Lantus and metformin on discharge, will follow for final recommendation  d/w hs       Problem/Recommendation - 2:  ·  Problem: Right hand pain.  Recommendation: - antibiotics as per primary team.

## 2021-05-26 NOTE — PROGRESS NOTE ADULT - ASSESSMENT
Confidential Drug Utilization Report  Search Terms: sugar frances, 1965   Search Date: 05/22/2021 08:52:39 AM   The Drug Utilization Report below displays all of the controlled substance prescriptions, if any, that your patient has filled in the last twelve months. The information displayed on this report is compiled from pharmacy submissions to the Department, and accurately reflects the information as submitted by the pharmacies.  This report was requested by: Cristina Scott | Reference #: [id number]   You have not added a ZAKIA number. Keeping your ZAKIA number(s) up to date on the My ZAKIA # page will enable the separation of your prescriptions from others in the search results.   Others' Prescriptions  Patient Name: Sugar Frances   YOB: 1965   Address: [address] [place] [address]   Sex: Male   Rx Written	Rx Dispensed	Drug	Quantity	Days Supply	Prescriber Name	Prescriber Zakia #	Payment Method	Dispenser  04/30/2021	05/01/2021	acetaminophen-cod #3 tablet 	15	5	[facility]	[id number]	Medicaid	[id number]  * - Drugs marked with an asterisk are compound drugs. If the compound drug is made up of more than one controlled substance, then each controlled substance will be a separate row in the table.

## 2021-05-26 NOTE — PROGRESS NOTE ADULT - ASSESSMENT
Patient is a 55y old  Male with PMH of DM and tendon rupture, now presents to the ER for evaluation of right hand pain. He had surgery on his right hand 2 weeks ago at [facility] for Dupuytren contracture. His doctor told him that the stitches would fall off by Monday but they did not and patient went to his Surgeon's office on Wed to get stitches removed. Also reports hand being inflamed . According to him, he got local anesthesia  and the doctor hit his ? nerve. He has severe pain in his right hand since then along with swelling. His doctor was not working so he came to the ER due to pain. He took Motrin, Tylenol, Advil with no relief. He has no fever, but chills and nausea. Reports pain extending from hand all the way to the shoulders, has difficulty moving his hand. Patient plays cricket and has h/o tendon rupture. ON admission, he has no fever but Tachycardia and Leukocytosis. The XRay of right hand has not revealing. He has started on Unasyn, and the ID consult requested to assist with further evaluation and antibiotic management.     # Sepsis ( Tachycardia + Leukocytosis )- resolved  # Right hand post-op wound infection    would recommend:    1. Management of Blood pressure as per Primary team   2. Continue oral Augmentin 875 mg q 12hours and Doxycycline until 5/29/21  3. Pain management as needed    d/w Patient  and Nursing staff     Attending Attestation:    Spent more than 35 minutes on total encounter, more than 50 % of the visit was spent counseling and/or coordinating care by the Attending physician.

## 2021-05-26 NOTE — DISCHARGE NOTE PROVIDER - HOSPITAL COURSE
55M PMH DM and tendon rupture presented 5/21 with r hand pain and swelling extending to shoulder unrelieved by OTCs s/p surgery for Dupuytren contracture at [place] 2 weeks ago followed by in-office stitch removal and I/D with Dr. Bills reportedly complicated by nerve injury during lidocaine administration.     He took motrin, tylenol, advil with no relief. Reports pain extending from hand all the way to the shoulders, has difficulty moving his hand. 55M PMH DM and tendon rupture presented 5/21 with r hand pain, immobility, and swelling extending to shoulder unrelieved by OTCs s/p surgery for Dupuytren contracture at [place] 2 weeks ago followed by in-office stitch removal and I/D with Dr. Bills reportedly complicated by nerve injury during lidocaine administration.     In ED, Tmax 99.3, , /116, RR 17, SpO2 100% RA. Adm WBC 15, , lactate 2.4. XR hand without acute findings. CT hand with postsurgical findings around 3rd digit flexor tendon vs reactive/infectious tenosynovitis. RUE Doppler without DVT. UCx, BCx NG. Treated with IV antibiotics, will be dc with Augmentin 875mg q12h and [id number] BID through 5/29. ID Dr. Lunsford following while inpatient.     Neuro Dr. Woodard consulted for neuropathic pain and poor mobility, will dc with gabapentin 200mg TID with instructions to follow up outpatient for further studies and possible EMG. Will additionally recommend OP occupational (hand) therapy.     Given poorly controlled DM with a1c 10.6, endo Dr. Sanches consulted. Will dc with Lantus 40 units and metformin 1000mg BID and instructions to follow up outpatient.     BP meds added this admission, losartan 50mg Qd and metoprolol 50mg Qd. Will continue outpatient and recommend follow up.              55M PMH DM and tendon rupture presented 5/21 with r hand pain, immobility, and swelling extending to shoulder unrelieved by OTCs s/p surgery for Dupuytren contracture at [place] 2 weeks ago followed by in-office stitch removal and I/D with Dr. Bills reportedly complicated by nerve injury during lidocaine administration.     In ED, Tmax 99.3, , /116, RR 17, SpO2 100% RA. Adm WBC 15, , lactate 2.4. XR hand without acute findings. CT hand with postsurgical findings around 3rd digit flexor tendon vs reactive/infectious tenosynovitis. RUE Doppler without DVT. UCx, BCx NG. Treated with IV antibiotics, will be dc with Augmentin 875mg q12h and [id number] BID through 5/29. ID Dr. Lunsford following while inpatient.     Neuro Dr. Woodard consulted for neuropathic pain and poor mobility, will dc with gabapentin 200mg TID with instructions to follow up outpatient for further studies and possible EMG. Will additionally recommend OP occupational (hand) therapy.     Given poorly controlled DM with a1c 10.6, endo Dr. Sanches consulted. Will dc with Lantus 40 units and metformin 1000mg BID and instructions to follow up outpatient.     BP meds added this admission, losartan 50mg Qd and metoprolol 50mg Qd. Will continue outpatient and recommend follow up.              55M PMH DM and tendon rupture presented 5/21 with r hand pain, immobility, and swelling extending to shoulder unrelieved by OTCs s/p surgery for Dupuytren contracture at [place] 2 weeks ago followed by in-office stitch removal and I/D with Dr. Bills reportedly complicated by nerve injury during lidocaine administration.     In ED, Tmax 99.3, , /116, RR 17, SpO2 100% RA. Adm WBC 15, , lactate 2.4. XR hand without acute findings. CT hand with postsurgical findings around 3rd digit flexor tendon vs reactive/infectious tenosynovitis. RUE Doppler without DVT. UCx, BCx NG. Treated with IV antibiotics, will be dc with Augmentin 875mg q12h and [id number] BID through 5/29. ID Dr. Lunsford following while inpatient.     Neuro Dr. Woodard consulted for neuropathic pain and poor mobility, will dc with gabapentin 200mg TID with instructions to follow up outpatient for further studies and possible EMG. Will additionally recommend OP occupational (hand) therapy.     Given poorly controlled DM with a1c 10.6, endo Dr. Sanches consulted. Will dc with Lantus 40 units and metformin 1000mg BID and instructions to follow up outpatient.     BP meds added this admission, losartan 50mg Qd and metoprolol 50mg Qd. Will continue outpatient and recommend follow up.      Seen and examined earlier today . Had feeling much better except little finger numbness. Wanted some percocet to go home on. Will follow up with his surgeon and PCP .              55M PMH DM and tendon rupture presented 5/21 with r hand pain, immobility, and swelling extending to shoulder unrelieved by OTCs s/p surgery for Dupuytren contracture at [place] 2 weeks ago followed by in-office stitch removal and I/D with Dr. Bills reportedly complicated by nerve injury during lidocaine administration.     In ED, Tmax 99.3, , /116, RR 17, SpO2 100% RA. Adm WBC 15, , lactate 2.4. XR hand without acute findings. CT hand with postsurgical findings around 3rd digit flexor tendon vs reactive/infectious tenosynovitis. RUE Doppler without DVT. UCx, BCx NG. Treated with IV antibiotics, will be dc with Augmentin 875mg q12h and [id number] BID through 5/29. ID Dr. Lunsford following while inpatient.     Neuro Dr. Woodard consulted for neuropathic pain and poor mobility, will dc with gabapentin 200mg TID with instructions to follow up outpatient for further studies and possible EMG. Will additionally recommend OP occupational (hand) therapy.     Given poorly controlled DM with a1c 10.6, endo Dr. Sanches consulted. Will dc with Lantus 40 units and metformin 1000mg BID and instructions to follow up outpatient.     BP meds added this admission, losartan 100mg Qd and metoprolol 50mg Qd. Will continue outpatient and recommend follow up.      Seen and examined earlier today . Had feeling much better except little finger numbness. Wanted some percocet to go home on. Will follow up with his surgeon and PCP .              55M PMH DM and tendon rupture presented 5/21 with r hand pain, immobility, and swelling extending to shoulder unrelieved by OTCs s/p surgery for Dupuytren contracture at [place] 2 weeks ago followed by in-office stitch removal and I/D with Dr. Bills reportedly complicated by nerve injury during lidocaine administration.     In ED, Tmax 99.3, , /116, RR 17, SpO2 100% RA. Adm WBC 15, , lactate 2.4. XR hand without acute findings. CT hand with postsurgical findings around 3rd digit flexor tendon vs reactive/infectious tenosynovitis. RUE Doppler without DVT. UCx, BCx NG. Treated with IV antibiotics, will be dc with Augmentin 875mg q12h and [id number] BID through 5/29. ID Dr. Lunsford following while inpatient.     Neuro Dr. Woodard consulted for neuropathic pain and poor mobility, will dc with gabapentin 300mg TID with instructions to follow up outpatient for further studies and possible EMG. Will additionally recommend OP occupational (hand) therapy.     Given poorly controlled DM with a1c 10.6, endo Dr. Sanches consulted. Will dc with Lantus 40 units and metformin 1000mg BID and instructions to follow up outpatient.     BP meds added this admission, losartan 100mg Qd and metoprolol 50mg Qd. Will continue outpatient and recommend follow up.      Seen and examined earlier today . Had feeling much better except little finger numbness. Wanted some percocet to go home on. Will follow up with his surgeon and PCP .              55M PMH DM and tendon rupture presented 5/21 with r hand pain, immobility, and swelling extending to shoulder unrelieved by OTCs s/p surgery for Dupuytren contracture at [place] 2 weeks ago followed by in-office stitch removal and I/D with Dr. Bills reportedly complicated by nerve injury during lidocaine administration.     In ED, Tmax 99.3, , /116, RR 17, SpO2 100% RA. Adm WBC 15, , lactate 2.4. XR hand without acute findings. CT hand with postsurgical findings around 3rd digit flexor tendon vs reactive/infectious tenosynovitis. RUE Doppler without DVT. UCx, BCx NG. Treated with IV antibiotics, will be dc with Augmentin 875mg q12h and [id number] BID through 5/29. ID Dr. Lunsford following while inpatient.     Neuro Dr. Woodard consulted for neuropathic pain and poor mobility, will dc with gabapentin 300mg TID with instructions to follow up outpatient for further studies and possible EMG. Will additionally recommend OP occupational (hand) therapy.     Given poorly controlled DM with a1c 10.6, endo Dr. Sanches consulted. Will dc with Lantus 40 units and metformin 1000mg BID and instructions to follow up outpatient.     BP meds added this admission, losartan 100mg Qd and metoprolol 50mg Qd. Will continue outpatient and recommend follow up.      Seen and examined earlier today . Had feeling much better except little finger numbness. Wanted some percocet to go home on. Will follow up with his surgeon and PCP .

## 2021-05-26 NOTE — DISCHARGE NOTE PROVIDER - CARE PROVIDER_API CALL
Cindy Sanches)  EndocrinologyMetabDiabetes  [address] [place] [address]  Phone: [phone]  Fax: [phone]  Established Patient  Follow Up Time: 2 weeks    Ollie Robins  INTERNAL MEDICINE  [address] [place] [address]  Phone: [phone]  Fax: [phone]  Established Patient  Follow Up Time: 1 week   Cindy Sanches)  EndocrinologyMetabDiabetes  [address] [place] [address]  Phone: [phone]  Fax: [phone]  Established Patient  Follow Up Time: 2 weeks    Ollie Robins  INTERNAL MEDICINE  [address] [place] [address]  Phone: [phone]  Fax: [phone]  Established Patient  Follow Up Time: 1 week    ASIA DENTON  [id number]  [address] [place] [address]  Phone: [phone]  Fax: [phone])-  Established Patient  Follow Up Time: 1 week

## 2021-05-26 NOTE — PROGRESS NOTE ADULT - SUBJECTIVE AND OBJECTIVE BOX
Patient is seen and examined at the bed side, is afebrile. The blood pressure not controlled.      REVIEW OF SYSTEMS: All other review systems are negative      ALLERGIES: Vasotec (Hives)      Vital Signs Last 24 Hrs  T(C): 36.7 (26 May 2021 15:24), Max: 37.1 (25 May 2021 21:03)  T(F): 98 (26 May 2021 15:24), Max: 98.8 (25 May 2021 21:03)  HR: 79 (26 May 2021 19:22) (79 - 108)  BP: 151/101 (26 May 2021 19:22) (132/82 - 187/113)  BP(mean): --  RR: 18 (26 May 2021 16:51) (17 - 18)  SpO2: 99% (26 May 2021 16:51) (97% - 99%)      PHYSICAL EXAM:  GENERAL: Not in distress   CHEST/LUNG: Not using accessory muscles   HEART: s1 and s2 present  ABDOMEN:  Nontender and  Nondistended  EXTREMITIES: Right hand swelling and pain improved significantly   CNS: Awake and Alert      LABS:                         13.0   9.85  )-----------( 320      ( 26 May 2021 06:22 )             39.0                12.3   11.46 )-----------( 272      ( 22 May 2021 07:19 )             37.4                         14.0   14.95 )-----------( 282      ( 21 May 2021 11:04 )             41.5           139  |  104  |  14  ----------------------------<  162<H>  3.9   |  24  |  0.79    Ca    9.0      26 May 2021 06:22  Phos  4.0       Mg     2.3         TPro  7.4  /  Alb  2.8<L>  /  TBili  0.4  /  DBili  x   /  AST  15  /  ALT  44  /  AlkPhos  66      139  |  104  |  14  ----------------------------<  162<H>  3.9   |  24  |  0.79    Ca    9.0      26 May 2021 06:22  Phos  4.0       Mg     2.3         TPro  7.4  /  Alb  2.8<L>  /  TBili  0.4  /  DBili  x   /  AST  15  /  ALT  44  /  AlkPhos  66      PT/INR - ( 21 May 2021 11:04 )   PT: 13.3 sec;   INR: 1.12 ratio      PTT - ( 21 May 2021 11:04 )  PTT:27.7 sec      CAPILLARY BLOOD GLUCOSE  POCT Blood Glucose.: 245 mg/dL (21 May 2021 18:10)  POCT Blood Glucose.: 283 mg/dL (21 May 2021 13:47)      Urinalysis Basic - ( 21 May 2021 12:07 )  Color: Yellow / Appearance: Clear / S.010 / pH: x  Gluc: x / Ketone: Trace  / Bili: Negative / Urobili: Negative   Blood: x / Protein: Negative / Nitrite: Negative   Leuk Esterase: Negative / RBC: x / WBC x   Sq Epi: x / Non Sq Epi: x / Bacteria: x      Vancomycin Level, Trough (21 @ 05:20)   Vancomycin Level, Trough: 5.7      MEDICATIONS  (STANDING):    BACItracin   Ointment 1 Application(s) Topical three times a day  doxycycline hyclate Capsule 100 milliGRAM(s) Oral every 12 hours  enoxaparin Injectable 40 milliGRAM(s) SubCutaneous daily  gabapentin 200 milliGRAM(s) Oral three times a day  insulin glargine Injectable (LANTUS) 40 Unit(s) SubCutaneous at bedtime  insulin lispro (ADMELOG) corrective regimen sliding scale   SubCutaneous three times a day before meals  insulin lispro (ADMELOG) corrective regimen sliding scale   SubCutaneous at bedtime  insulin lispro Injectable (ADMELOG) 4 Unit(s) SubCutaneous three times a day before meals  metoprolol succinate ER 50 milliGRAM(s) Oral daily  polyethylene glycol 3350 17 Gram(s) Oral daily  senna 2 Tablet(s) Oral at bedtime        RADIOLOGY & ADDITIONAL TESTS:    21 : Xray Hand 3 Views, Right (21 @ 11:22) :  Right hand. 3 views. Patient has local pain and swelling after injection.  There is mild film IP degeneration and lesser degeneration elsewhere in the digits. No bone destruction or fracture.        MICROBIOLOGY DATA:        mCulture - Urine (21 @ 18:21)   Specimen Source: .Urine Clean Catch (Midstream)   Culture Results: No growth     Culture - Blood (21 @ 18:19)   Specimen Source: .Blood Blood-Peripheral   Culture Results: No growth to date.     Culture - Blood (21 @ 18:19)   Specimen Source: .Blood Blood-Peripheral   Culture Results: No growth to date.     COVID-19 Ambrocio Domain Antibody (21 @ 11:18)   COVID-19 Ambrocio Domain Antibody Result: >250.00:     COVID-19 PCR . (21 @ 12:07)   COVID-19 PCR: NotDetec:

## 2021-05-26 NOTE — DISCHARGE NOTE PROVIDER - PROVIDER TOKENS
PROVIDER:[TOKEN:[[id number]],FOLLOWUP:[2 weeks],ESTABLISHEDPATIENT:[T]],PROVIDER:[TOKEN:[[id number]],FOLLOWUP:[1 week],ESTABLISHEDPATIENT:[T]] PROVIDER:[TOKEN:[[id number]],FOLLOWUP:[2 weeks],ESTABLISHEDPATIENT:[T]],PROVIDER:[TOKEN:[[id number]],FOLLOWUP:[1 week],ESTABLISHEDPATIENT:[T]],PROVIDER:[TOKEN:[[id number]],FOLLOWUP:[1 week],ESTABLISHEDPATIENT:[T]]

## 2021-05-26 NOTE — PROGRESS NOTE ADULT - SUBJECTIVE AND OBJECTIVE BOX
Source of information: , Chart review  Patient language: English  : n/a    CC:  right hand pain    This is a 55yMale patient who presents to the hospital for Patient is a 55y old  Male who presents with a chief complaint of hand pain (26 May 2021 10:30)    Pt s/p Dupuytren's contracture surgery in may.  Pt developed surgical site infection post operatively  + numbness of right hand.  Pt with decreased sensation and movement of right hand 4th and 5th digits.  Pt can move digits better than when he first arrived.  + pain. + radicular pain radiates down hand.  Pt on abx and to be dc today.     PAIN SCORE:      4/10 scALE USED: (1-10 NRS)      PAST MEDICAL & SURGICAL HISTORY:  Diabetes mellitus  type II    Hyperuricemia    Dyslipidemia    Anal fistula    Sprain, bicep    Hx of appendectomy  1987    Anal fistula  Ananl fistula repair 2013    Tendon tear, ankle, left, sequela  2016        FAMILY HISTORY:  Family history of hypertension (Mother)    Family history of diabetic complications (Mother)          SOCIAL HISTORY:  [x ] Denies Smoking, Alcohol, or Drug Use    Allergies    Vasotec (Hives)    Intolerances        MEDICATIONS:    MEDICATIONS  (STANDING):  BACItracin   Ointment 1 Application(s) Topical three times a day  cefepime   IVPB      cefepime   IVPB 1000 milliGRAM(s) IV Intermittent every 8 hours  doxycycline hyclate Capsule 100 milliGRAM(s) Oral every 12 hours  enoxaparin Injectable 40 milliGRAM(s) SubCutaneous daily  gabapentin 200 milliGRAM(s) Oral three times a day  insulin glargine Injectable (LANTUS) 40 Unit(s) SubCutaneous at bedtime  insulin lispro (ADMELOG) corrective regimen sliding scale   SubCutaneous three times a day before meals  insulin lispro (ADMELOG) corrective regimen sliding scale   SubCutaneous at bedtime  insulin lispro Injectable (ADMELOG) 4 Unit(s) SubCutaneous three times a day before meals  losartan 50 milliGRAM(s) Oral daily  metoprolol succinate ER 50 milliGRAM(s) Oral daily  polyethylene glycol 3350 17 Gram(s) Oral daily  senna 2 Tablet(s) Oral at bedtime    MEDICATIONS  (PRN):  oxycodone    5 mG/acetaminophen 325 mG 1 Tablet(s) Oral every 4 hours PRN Severe Pain (7 - 10)      Vital Signs Last 24 Hrs  T(C): 36.8 (26 May 2021 05:05), Max: 37.1 (25 May 2021 21:03)  T(F): 98.3 (26 May 2021 05:05), Max: 98.8 (25 May 2021 21:03)  HR: 92 (26 May 2021 05:05) (80 - 105)  BP: 132/82 (26 May 2021 05:05) (132/82 - 187/113)  BP(mean): --  RR: 17 (26 May 2021 05:05) (17 - 18)  SpO2: 97% (26 May 2021 05:05) (96% - 99%)    LABS:                          13.0   9.85  )-----------( 320      ( 26 May 2021 06:22 )             39.0     05-26    139  |  104  |  14  ----------------------------<  162<H>  3.9   |  24  |  0.79    Ca    9.0      26 May 2021 06:22  Phos  4.0     05-26  Mg     2.3     05-26    TPro  7.4  /  Alb  2.8<L>  /  TBili  0.4  /  DBili  x   /  AST  15  /  ALT  44  /  AlkPhos  66  05-26      LIVER FUNCTIONS - ( 26 May 2021 06:22 )  Alb: 2.8 g/dL / Pro: 7.4 g/dL / ALK PHOS: 66 U/L / ALT: 44 U/L DA / AST: 15 U/L / GGT: x             CAPILLARY BLOOD GLUCOSE      POCT Blood Glucose.: 236 mg/dL (26 May 2021 07:47)  POCT Blood Glucose.: 213 mg/dL (25 May 2021 21:10)  POCT Blood Glucose.: 183 mg/dL (25 May 2021 16:29)  POCT Blood Glucose.: 166 mg/dL (25 May 2021 11:39)      Radiology:  < from: CT Hand w/ IV Cont, Right (05.22.21 @ 12:09) >    EXAM:  CT HAND ONLY IC RT                            PROCEDURE DATE:  05/22/2021          INTERPRETATION:  EXAMINATION:CT HAND WITH IV CONTRAST RIGHT    CLINICAL INDICATION: History of diabetes status post tendon repair 2 weeks ago, now with hand pain and swelling. Evaluate for compartment syndrome.    COMPARISON: Radiographs of the right hand dated 5/21/2021    TECHNIQUE: CT of the right hand was performed intravenous contrast: 90 mL Omnipaque 350.    INTERPRETATION:    Bones: There is no fracture. The joint spaces are maintained.    Soft Tissues: There is volar hand skin thickening centered at the third digit at the level of the MCP joint with subcutaneous edema and underlying prominence/enlargement up to 1 cm AP of the third digit flexortendon with peripheral enhancement (series 3 image 29) which may be partly the postsurgical appearance however tenosynovitis is suspected which could be infectious or inflammatory/postsurgical. There is surrounding soft tissue edema and phlegmon. There is also diffuse soft tissue edema about the hand.    Volar to the distal radius on series 7 image 35 is a localized rim-enhancing fluid collection which may represent a volar ganglion cyst measuring 1.5 cm craniocaudally.    IMPRESSION:  1.  Localized volar hand skin thickening at the level of the third digit MCP joint, presumed to be the site of recent surgery. Underlying enlargement and peripheral enhancement associated with the third digit flexor tendon. The finding may be party postsurgical, however tenosynovitis, reactive or infectious, is suspected. There is surrounding soft tissue edema and phlegmon.    MRI of the hand without and with intravenous contrast may provide a more sensitive evaluation of the soft tissues, better defined the extent of infection. This exam is recommended if there are no clinical contraindications.    Compartment syndrome should be evaluated for on a clinical basis.    2.  Volar to the distal radius is a 1.5 cm localized fluid collection, likely representing a volar ganglion cyst given this is at a distance from the surgical site, although abscess would have a similar appearance on CT.    < end of copied text >      Drug Screen:        REVIEW OF SYSTEMS:  CONSTITUTIONAL: No fever or fatigue  RESPIRATORY: No cough, wheezing, chills or hemoptysis; No shortness of breath  CARDIOVASCULAR: No chest pain, palpitations, dizziness, or leg swelling  GASTROINTESTINAL: No abdominal or epigastric pain. No nausea, vomiting; No diarrhea or constipation.   GENITOURINARY: No dysuria, frequency, hematuria, retention or incontinence  MUSCULOSKELETAL: + right hand numbness and pain  NEURO: No headaches, No numbness/tingling b/l LE, No weakness  PSYCHIATRIC: No depression, anxiety, mood swings, or difficulty sleeping    PHYSICAL EXAM:  GENERAL:  Alert & Oriented X3, NAD, Good concentration  CHEST/LUNG: Clear to auscultation bilaterally; No rales, rhonchi, wheezing, or rubs  HEART: Regular rate and rhythm; No murmurs, rubs, or gallops  ABDOMEN: Soft, Nontender, Nondistended; Bowel sounds present  EXTREMITIES:  2+ Peripheral Pulses, No cyanosis, or edema  MUSCULOSKELETAL: + decreased rom + right hand - + edema, tenderness + decreased sensation  SKIN: No rashes or lesions    Risk factors associated with adverse outcomes related to opioid treatment  [ ]  Concurrent benzodiazepine use  [ ]  History/ Active substance use or alcohol use disorder  [ ] Psychiatric co-morbidity  [ ] Sleep apnea  [ ] COPD  [ ] BMI> 35  [ ] Liver dysfunction  [ ] Renal dysfunction  [ ] CHF  [ ] Smoker  [ ]  Age > 60 years    [x ]  [place]  Reviewed and Copied to Chart. See below.    Plan of care and goal oriented pain management treatment options were discussed with patient and /or primary care giver; all questions and concerns were addressed and care was aligned with patient's wishes.    Educated patient on goal oriented pain management treatment options     05-26-21 @ 11:35

## 2021-05-26 NOTE — DISCHARGE NOTE PROVIDER - NSDCCPCAREPLAN_GEN_ALL_CORE_FT
PRINCIPAL DISCHARGE DIAGNOSIS  Diagnosis: Cellulitis of hand, right  Assessment and Plan of Treatment: You came to the hospital with a soft tissue infection following right hand surgery a few weeks ago. You were treated with strong antibiotics while inpatient with improvement.   At home, take DOXYCYCLINE 100mg TWICE A DAY and AUGMENTIN 875mg-125mg THREE TIMES A DAY through May 29th. You may also apply BACITRACIN OINTMENT to the area THREE TIMES A DAY for 7 days to help prevent superficial infection.  Follow up with your primary care doctor within 1 week of discharge to review your hospital course and assess for recovery, and follow up with your outpatient hand surgeon to continue appropriate post-operative care.      SECONDARY DISCHARGE DIAGNOSES  Diagnosis: Hypertensive urgency  Assessment and Plan of Treatment: This admission, your blood pressure was very high, increasing the risks of acute bleeding, strokes, and organ damage. While likely partially due to pain, the high level of your blood pressure suggests that you may have chronically high blood pressure, or hypertension. Over time, persistently high blood pressure can increase the risks of heart disease, strokes, and numerous other issues.     Lifestyle changes can help to control blood pressure.    Diagnosis: Numbness and tingling of right upper extremity  Assessment and Plan of Treatment: You have reported numbness, tingling, and immobility of your right arm/hand following an in-office wound cleaning procedure last week. You were evaluated by our neurology and pain management teams, and we have started GABAPENTIN 200mg THREE TIMES A DAY to help treat this pain.   Follow up with neurologist Dr. CHEN within 1 WEEK of discharge for further studies, including a type of nerve study called EMG. Continue to follow up with your outpatient surgeon, as discussed above, for appropriate post-operative care. You may also seek outpatient OCCUPATIONAL THERAPY for hand mobility.     PRINCIPAL DISCHARGE DIAGNOSIS  Diagnosis: Cellulitis of hand, right  Assessment and Plan of Treatment: You came to the hospital with a soft tissue infection following right hand surgery a few weeks ago. You were treated with strong antibiotics while inpatient with improvement.   At home, take DOXYCYCLINE 100mg TWICE A DAY and AUGMENTIN 875mg-125mg THREE TIMES A DAY through May 29th. You may also apply BACITRACIN OINTMENT to the area THREE TIMES A DAY for 7 days to help prevent superficial infection.  Follow up with your primary care doctor within 1 week of discharge to review your hospital course and assess for recovery, and follow up with your outpatient hand surgeon to continue appropriate post-operative care.      SECONDARY DISCHARGE DIAGNOSES  Diagnosis: Hypertensive urgency  Assessment and Plan of Treatment: This admission, your blood pressure was very high, increasing the risks of acute bleeding, strokes, and organ damage. While likely partially due to pain, the high level of your blood pressure suggests that you may have chronically high blood pressure, or hypertension. Over time, persistently high blood pressure can increase the risks of heart disease, strokes, and numerous other issues.     Take LOSARTAN 50mg ONCE A DAY and METOPROLOL SUCCINATE 50mg ONCE A DAY to help control your blood pressure. During the above-recommended follow up appointment with your primary care provider, discuss these medications, recheck your pressure, and come up with a plan for long-term monitoring and management.   Lifestyle changes can help to control blood pressure. A diet rich in fiber (fruits and vegetables) and low-fat dairy, supplemented with poultry/fish (in place of red meat), whole grains, and nuts is beneficial for blood pressure, especially when daily salt intake is less than 1.5g per day.    Diagnosis: Diabetes mellitus  Assessment and Plan of Treatment: You have a history of diabetes, a disease in which your body is unable to regulate your blood sugar, increasing the risks of heart disease, strokes, neurological damage, vision loss, and numerous other issues. This admission, your a1c was 10.6, suggesting poor control of your blood sugars over the past 3 months. You were seen by our endocrinology team while inpatient. At home, take BASAGLAR 40 units AT BEDTIME and continue your home METFORMIN 1000mg TWICE A DAY.   Follow up with endocrinologist Dr. BARRAGAN outpatient within 2 weeks to review your medication course and establish a plan for long-term monitoring and management.   As mentioned above, diabetes can cause vision loss. Follow up with an eye doctor ONCE A YEAR for a complete vision check.   Note that the above-recommended lifestyle changes for high blood pressure are also beneficial for diabetes, in addition to lowering the amount of simple sugars/carbohydrates consumed daily.    Diagnosis: Numbness and tingling of right upper extremity  Assessment and Plan of Treatment: You have reported numbness, tingling, and immobility of your right arm/hand following an in-office wound cleaning procedure last week. You were evaluated by our neurology and pain management teams. At home, take GABAPENTIN 300mg THREE TIMES A DAY to help treat this pain, with DICLOFENAC 25mg EVERY 8 HOURS as needed for moderate breakthrough pain and PERCOCET (oxycodone-acetaminophen) 5mg-325mg EVERY 6 HOURS as needed for SEVERE breakthrough pain.   Be sure to take the medications in order of increasing severity--do not take percocet without first having tried diclofenac. Note that PERCOCET is an opioid and carries high risk of dependence, tolerance (reduced efficacy over time), and abuse. Do not take more than prescribed, share with others, or leave where others can find. Dispose of extra pills AT YOUR PHARMACY. Do not throw them in the garbage.   NSAIDs such as diclofenac can cause GI bleeding. Monitor yourself for black, maroon, or red coloring in the stool, and seek medical attention if any is noticed. Only take diclofenac as prescribed, and avoid taking other NSAID medications (ibuprofen, Motrin, Advil, naproxen) while taking these medications.   Opioids can cause constipation. You may take SENNA 2 tablets at bedtime and MIRALAX 17g ONCE A DAY as needed for constipation while on opioids.   Follow up with neurologist Dr. CHEN within 1 WEEK of discharge for further studies, including a type of nerve study called EMG. Continue to follow up with your outpatient surgeon, as discussed above, for appropriate post-operative care. You may also seek outpatient OCCUPATIONAL THERAPY for hand mobility.     PRINCIPAL DISCHARGE DIAGNOSIS  Diagnosis: Cellulitis of hand, right  Assessment and Plan of Treatment: You came to the hospital with a soft tissue infection following right hand surgery a few weeks ago. You were treated with strong antibiotics while inpatient with improvement.   At home, take DOXYCYCLINE 100mg TWICE A DAY and AUGMENTIN 875mg-125mg THREE TIMES A DAY through May 29th. You may also apply BACITRACIN OINTMENT to the area THREE TIMES A DAY for 7 days to help prevent superficial infection.  Follow up with your primary care doctor within 1 week of discharge to review your hospital course and assess for recovery, and follow up with your outpatient hand surgeon to continue appropriate post-operative care.      SECONDARY DISCHARGE DIAGNOSES  Diagnosis: Hypertensive urgency  Assessment and Plan of Treatment: This admission, your blood pressure was very high, increasing the risks of acute bleeding, strokes, and organ damage. While likely partially due to pain, the high level of your blood pressure suggests that you may have chronically high blood pressure, or hypertension. Over time, persistently high blood pressure can increase the risks of heart disease, strokes, and numerous other issues.     Take LOSARTAN 100mg ONCE A DAY and METOPROLOL SUCCINATE 50mg ONCE A DAY to help control your blood pressure. During the above-recommended follow up appointment with your primary care provider, discuss these medications, recheck your pressure, and come up with a plan for long-term monitoring and management.   Lifestyle changes can help to control blood pressure. A diet rich in fiber (fruits and vegetables) and low-fat dairy, supplemented with poultry/fish (in place of red meat), whole grains, and nuts is beneficial for blood pressure, especially when daily salt intake is less than 1.5g per day.    Diagnosis: Diabetes mellitus  Assessment and Plan of Treatment: You have a history of diabetes, a disease in which your body is unable to regulate your blood sugar, increasing the risks of heart disease, strokes, neurological damage, vision loss, and numerous other issues. This admission, your a1c was 10.6, suggesting poor control of your blood sugars over the past 3 months. You were seen by our endocrinology team while inpatient. At home, take BASAGLAR 40 units AT BEDTIME and continue your home METFORMIN 1000mg TWICE A DAY.   Follow up with endocrinologist Dr. BARRAGAN outpatient within 2 weeks to review your medication course and establish a plan for long-term monitoring and management.   As mentioned above, diabetes can cause vision loss. Follow up with an eye doctor ONCE A YEAR for a complete vision check.   Note that the above-recommended lifestyle changes for high blood pressure are also beneficial for diabetes, in addition to lowering the amount of simple sugars/carbohydrates consumed daily.    Diagnosis: Numbness and tingling of right upper extremity  Assessment and Plan of Treatment: You have reported numbness, tingling, and immobility of your right arm/hand following an in-office wound cleaning procedure last week. You were evaluated by our neurology and pain management teams. At home, take GABAPENTIN 300mg THREE TIMES A DAY to help treat this pain, with DICLOFENAC 25mg EVERY 8 HOURS as needed for moderate breakthrough pain and PERCOCET (oxycodone-acetaminophen) 5mg-325mg EVERY 6 HOURS as needed for SEVERE breakthrough pain.   Be sure to take the medications in order of increasing severity--do not take percocet without first having tried diclofenac. Note that PERCOCET is an opioid and carries high risk of dependence, tolerance (reduced efficacy over time), and abuse. Do not take more than prescribed, share with others, or leave where others can find. Dispose of extra pills AT YOUR PHARMACY. Do not throw them in the garbage.   NSAIDs such as diclofenac can cause GI bleeding. Monitor yourself for black, maroon, or red coloring in the stool, and seek medical attention if any is noticed. Only take diclofenac as prescribed, and avoid taking other NSAID medications (ibuprofen, Motrin, Advil, naproxen) while taking these medications.   Opioids can cause constipation. You may take SENNA 2 tablets at bedtime and MIRALAX 17g ONCE A DAY as needed for constipation while on opioids.   Follow up with neurologist Dr. CHEN within 1 WEEK of discharge for further studies, including a type of nerve study called EMG. Continue to follow up with your outpatient surgeon, as discussed above, for appropriate post-operative care. You may also seek outpatient OCCUPATIONAL THERAPY for hand mobility.

## 2021-05-26 NOTE — DISCHARGE NOTE NURSING/CASE MANAGEMENT/SOCIAL WORK - PATIENT PORTAL LINK FT
You can access the FollowMyHealth Patient Portal offered by [facility] by registering at the following website: [email or web address]. By joining [facility]’s FollowMyHealth portal, you will also be able to view your health information using other applications (apps) compatible with our system.

## 2021-05-26 NOTE — DISCHARGE NOTE PROVIDER - NSDCMRMEDTOKEN_GEN_ALL_CORE_FT
ibuprofen 600 mg oral tablet: 1 tab(s) orally every 8 hours as needed for pain  metFORMIN 1000 mg oral tablet: 1 tab(s) orally 2 times a day   amoxicillin-clavulanate 875 mg-125 mg oral tablet: 1 tab(s) orally every 8 hours   bacitracin 500 units/g topical ointment: 1 application topically 3 times a day  Basaglar KwikPen 100 units/mL subcutaneous solution: 40 unit(s) subcutaneous once a day (at bedtime)   doxycycline monohydrate 100 mg oral capsule: 1 cap(s) orally every 12 hours  ibuprofen 600 mg oral tablet: 1 tab(s) orally every 8 hours as needed for pain  losartan 50 mg oral tablet: 1 tab(s) orally once a day  metFORMIN 1000 mg oral tablet: 1 tab(s) orally 2 times a day  metoprolol succinate 50 mg oral tablet, extended release: 1 tab(s) orally once a day   amoxicillin-clavulanate 875 mg-125 mg oral tablet: 1 tab(s) orally every 8 hours   bacitracin 500 units/g topical ointment: 1 application topically 3 times a day  Basaglar KwikPen 100 units/mL subcutaneous solution: 40 unit(s) subcutaneous once a day (at bedtime)   diclofenac potassium 25 mg oral capsule: 1 cap(s) orally every 8 hours, As Needed   doxycycline monohydrate 100 mg oral capsule: 1 cap(s) orally every 12 hours  gabapentin 300 mg oral capsule: 1 cap(s) orally 3 times a day   losartan 50 mg oral tablet: 1 tab(s) orally once a day  metFORMIN 1000 mg oral tablet: 1 tab(s) orally 2 times a day  metoprolol succinate 50 mg oral tablet, extended release: 1 tab(s) orally once a day  oxycodone-acetaminophen 5 mg-325 mg oral tablet: 1 tab(s) orally every 6 hours, As Needed -Severe Pain (7 - 10) MDD:4  senna oral tablet: 2 tab(s) orally once a day (at bedtime)   amoxicillin-clavulanate 875 mg-125 mg oral tablet: 1 tab(s) orally every 8 hours   bacitracin 500 units/g topical ointment: 1 application topically 3 times a day  Basaglar KwikPen 100 units/mL subcutaneous solution: 40 unit(s) subcutaneous once a day (at bedtime)   diclofenac potassium 25 mg oral capsule: 1 cap(s) orally every 8 hours, As Needed   doxycycline monohydrate 100 mg oral capsule: 1 cap(s) orally every 12 hours  gabapentin 300 mg oral capsule: 1 cap(s) orally 3 times a day   losartan 100 mg oral tablet: 1 tab(s) orally once a day   losartan 50 mg oral tablet: 1 tab(s) orally once a day  metFORMIN 1000 mg oral tablet: 1 tab(s) orally 2 times a day  metoprolol succinate 50 mg oral tablet, extended release: 1 tab(s) orally once a day  oxycodone-acetaminophen 5 mg-325 mg oral tablet: 1 tab(s) orally every 6 hours, As Needed -Severe Pain (7 - 10) MDD:4  senna oral tablet: 2 tab(s) orally once a day (at bedtime)   amoxicillin-clavulanate 875 mg-125 mg oral tablet: 1 tab(s) orally every 8 hours   bacitracin 500 units/g topical ointment: 1 application topically 3 times a day  Basaglar KwikPen 100 units/mL subcutaneous solution: 40 unit(s) subcutaneous once a day (at bedtime)   diclofenac potassium 25 mg oral capsule: 1 cap(s) orally every 8 hours, As Needed   doxycycline monohydrate 100 mg oral capsule: 1 cap(s) orally every 12 hours  gabapentin 300 mg oral capsule: 1 cap(s) orally 3 times a day   losartan 100 mg oral tablet: 1 tab(s) orally once a day   metFORMIN 1000 mg oral tablet: 1 tab(s) orally 2 times a day  metoprolol succinate 50 mg oral tablet, extended release: 1 tab(s) orally once a day  oxycodone-acetaminophen 5 mg-325 mg oral tablet: 1 tab(s) orally every 6 hours, As Needed -Severe Pain (7 - 10) MDD:4  senna oral tablet: 2 tab(s) orally once a day (at bedtime)

## 2021-05-27 VITALS
HEART RATE: 87 BPM | OXYGEN SATURATION: 97 % | SYSTOLIC BLOOD PRESSURE: 134 MMHG | TEMPERATURE: 98 F | RESPIRATION RATE: 18 BRPM | DIASTOLIC BLOOD PRESSURE: 79 MMHG

## 2021-05-27 LAB — GLUCOSE BLDC GLUCOMTR-MCNC: 137 MG/DL — HIGH (ref 70–99)

## 2021-05-27 RX ORDER — LOSARTAN POTASSIUM 100 MG/1
1 TABLET, FILM COATED ORAL
Qty: 30 | Refills: 0
Start: 2021-05-27 | End: 2021-06-25

## 2021-05-27 RX ADMIN — GABAPENTIN 200 MILLIGRAM(S): 400 CAPSULE ORAL at 05:29

## 2021-05-27 RX ADMIN — Medication 15 MILLIGRAM(S): at 04:17

## 2021-05-27 RX ADMIN — OXYCODONE AND ACETAMINOPHEN 1 TABLET(S): 5; 325 TABLET ORAL at 04:12

## 2021-05-27 RX ADMIN — Medication 50 MILLIGRAM(S): at 05:30

## 2021-05-27 RX ADMIN — Medication 1 TABLET(S): at 10:09

## 2021-05-27 RX ADMIN — Medication 15 MILLIGRAM(S): at 04:47

## 2021-05-27 RX ADMIN — Medication 100 MILLIGRAM(S): at 05:29

## 2021-05-27 RX ADMIN — Medication 1 APPLICATION(S): at 05:30

## 2021-05-27 RX ADMIN — LOSARTAN POTASSIUM 100 MILLIGRAM(S): 100 TABLET, FILM COATED ORAL at 05:47

## 2021-05-27 RX ADMIN — OXYCODONE AND ACETAMINOPHEN 1 TABLET(S): 5; 325 TABLET ORAL at 03:42

## 2021-05-27 RX ADMIN — Medication 4 UNIT(S): at 07:54

## 2021-05-27 NOTE — PROGRESS NOTE ADULT - PROBLEM SELECTOR PROBLEM 1
SIRS (systemic inflammatory response syndrome)
Right hand pain
SIRS (systemic inflammatory response syndrome)
Right hand pain
SIRS (systemic inflammatory response syndrome)
SIRS (systemic inflammatory response syndrome)

## 2021-05-27 NOTE — PROGRESS NOTE ADULT - PROBLEM SELECTOR PLAN 2
Sugars high so endo consulted and Lantus increased.   pt takes metformin at home  start HSS  f/u A1C  monitor fs achs.   willing to take insulin at home, danielle Sanches following

## 2021-05-27 NOTE — PROGRESS NOTE ADULT - SUBJECTIVE AND OBJECTIVE BOX
PGY-1 Progress Note discussed with attending    PAGER #: [[phone]] TILL 5:00 PM  PLEASE CONTACT ON CALL TEAM:  - On Call Team (Please refer to Mario) FROM 5:00 PM - 8:30PM  - Nightfloat Team FROM 8:30 -7:30 AM      INTERVAL HPI/OVERNIGHT EVENTS: Endorses improvement in pain, assessed by pain management team and neuro with instructions to follow up. Intended for dc yesterday, but given elevated BP increased losartan to 100mg Qd and observed. BP improved this am, ready for dc. Despite endorsed improvement, patient requesting to see neuro for "faster acting" medication before dc. Will discuss with team.    MEDICATIONS  (STANDING):  BACItracin   Ointment 1 Application(s) Topical three times a day  cefepime   IVPB      cefepime   IVPB 1000 milliGRAM(s) IV Intermittent every 8 hours  enoxaparin Injectable 40 milliGRAM(s) SubCutaneous daily  insulin glargine Injectable (LANTUS) 40 Unit(s) SubCutaneous at bedtime  insulin lispro (ADMELOG) corrective regimen sliding scale   SubCutaneous at bedtime  insulin lispro (ADMELOG) corrective regimen sliding scale   SubCutaneous three times a day before meals  insulin lispro Injectable (ADMELOG) 4 Unit(s) SubCutaneous three times a day before meals  losartan 50 milliGRAM(s) Oral daily  polyethylene glycol 3350 17 Gram(s) Oral daily  senna 2 Tablet(s) Oral at bedtime  vancomycin  IVPB 1250 milliGRAM(s) IV Intermittent every 12 hours    MEDICATIONS  (PRN):  morphine  - Injectable 2 milliGRAM(s) IV Push every 6 hours PRN Severe Pain (7 - 10)  oxycodone    5 mG/acetaminophen 325 mG 2 Tablet(s) Oral every 4 hours PRN Severe Pain (7 - 10)  oxycodone    5 mG/acetaminophen 325 mG 1 Tablet(s) Oral every 4 hours PRN Moderate Pain (4 - 6)    REVIEW OF SYSTEMS:  CONSTITUTIONAL: No fever or fatigue  RESPIRATORY: No cough, no SOB  CARDIOVASCULAR: No CP, no dizziness  GASTROINTESTINAL: no abdominal pain, no n/v/d/c  GENITOURINARY: No dysuria or hematuria  NEUROLOGICAL: No headaches  SKIN: No itching or lesions   EXT: pain and swelling in r arm, waxing/waning, markedly improved   all other systems reviewed and are negative     Vital Signs Last 24 Hrs  T(C): 36.7 (27 May 2021 05:25), Max: 36.8 (26 May 2021 20:42)  T(F): 98.1 (27 May 2021 05:25), Max: 98.3 (26 May 2021 20:42)  HR: 87 (27 May 2021 05:25) (79 - 108)  BP: 134/79 (27 May 2021 05:25) (134/79 - 178/111)  BP(mean): --  RR: 18 (27 May 2021 05:25) (18 - 18)  SpO2: 97% (27 May 2021 05:25) (97% - 99%)    PHYSICAL EXAMINATION:  GENERAL: NAD, well built  HEAD:  Atraumatic, Normocephalic  EYES:  conjunctiva and sclera clear  NECK: Supple  CHEST/LUNG: CTA b/l, no increased WOB  HEART: RRR  ABDOMEN: soft, NTND, +BS  NERVOUS SYSTEM: alert and oriented x3  EXTREMITIES:  r hand swollen to wrist, scar noted in palm, improved tender to palpation   SKIN: warm dry                          12.9   8.03  )-----------( 295      ( 24 May 2021 08:40 )             38.5     05-24    132<L>  |  99  |  13  ----------------------------<  401<H>  3.9   |  23  |  0.93    Ca    8.6      24 May 2021 08:40                CAPILLARY BLOOD GLUCOSE      RADIOLOGY & ADDITIONAL TESTS:

## 2021-05-27 NOTE — PROGRESS NOTE ADULT - PROBLEM SELECTOR PLAN 4
p/w /116   pt states he is not on any bp meds at home   Patient reported his doctor prescribed him lopressor 50mg 3 weeks ago but he hasn't been taking it.  will start on losartan for now, increase ahead of intended dc 5/26, will dc with 100mg Qd  monitor BP  control pain.
p/w /116   pt states he is not on any bp meds at home   Patient reported his doctor prescribed him lopressor 50mg 3 weeks ago but he hasn't been taking it.  will start on losartan for now  monitor BP  control pain.
p/w /116   pt states he is not on any bp meds at home   Patient reported his doctor prescribed him lopressor 50mg 3 weeks ago but he hasn't been taking it.  will start on losartan for now, increase ahead of intended dc 5/26  monitor BP  control pain.
p/w /116   pt states he is not on any bp meds at home   Patient reported his doctor prescribed him lopressor 50mg 3 weeks ago but he hasn't been taking it.  will start on losartan for now  monitor BP  control pain.

## 2021-05-27 NOTE — PROGRESS NOTE ADULT - REASON FOR ADMISSION
hand pain

## 2021-05-27 NOTE — PROGRESS NOTE ADULT - NSICDXPILOT_GEN_ALL_CORE
[place]

## 2021-05-27 NOTE — PROGRESS NOTE ADULT - ASSESSMENT
Assesment/plan    55 y old male with PMH of DM and tendon rupture presents to the ED with complaint of right hand pain. Patient reports he had surgery on his right hand 2 weeks ago at [facility] for dupuytren contracture. Endo consulted for uncontrolled DM. A1c: 10.6. Reports non compliance with metformin and that he was trying to control his blood glucose with dietary compliance. Pt reluctant to starting insulin at this point. Pt educated about diabetes and about importance of insulin for better glucose control and also that it will help in quicker healing of his hand infection. pt agreed for long acting insulin at bedtime.        Problem/Recommendation - 1:  Problem: Uncontrolled diabetes mellitus. Recommendation: better controlled on insulin   - A1c: 10.6, Pt reports non compliance with medication, was non compliant with metformin, reports compliance with diet.   - c/w  Lantus 40 units at bedtime, humalog 4 units premeal with SS coverage pre meal and bedtime while inpatient   - Discharge medication recommendation: Lantus 40 units hs with metformin 1g BID.   - Diabetes education   d/w hs       Problem/Recommendation - 2:  ·  Problem: Right hand pain.  Recommendation: - antibiotics as per primary team.  Assesment/plan    55 y old male with PMH of DM and tendon rupture presents to the ED with complaint of right hand pain. Patient reports he had surgery on his right hand 2 weeks ago at [facility] for dupuytren contracture. Endo consulted for uncontrolled DM. A1c: 10.6. Reports non compliance with metformin and that he was trying to control his blood glucose with dietary compliance. Pt reluctant to starting insulin at this point. Pt educated about diabetes and about importance of insulin for better glucose control and also that it will help in quicker healing of his hand infection. pt agreed for long acting insulin at bedtime.        Problem/Recommendation - 1:  Problem: Uncontrolled diabetes mellitus. Recommendation: better controlled on insulin   - A1c: 10.6, Pt reports non compliance with medication, was non compliant with metformin, reports compliance with diet.   - c/w  Lantus 40 units at bedtime, humalog 4 units premeal with SS coverage pre meal and bedtime while inpatient   - Discharge medication recommendation: Lantus 40 units hs with metformin 1g BID.   - Diabetes education   d/w hs  will sign off        Problem/Recommendation - 2:  ·  Problem: Right hand pain.  Recommendation: - antibiotics as per primary team.

## 2021-05-27 NOTE — PROGRESS NOTE ADULT - PROBLEM SELECTOR PROBLEM 3
Cellulitis of hand, right
Hypertensive urgency
Hypertensive urgency
Cellulitis of hand, right

## 2021-05-27 NOTE — PROGRESS NOTE ADULT - PROBLEM SELECTOR PROBLEM 4
Hypertensive urgency
Diabetes mellitus
Diabetes mellitus
Hypertensive urgency

## 2021-05-27 NOTE — PROGRESS NOTE ADULT - SUBJECTIVE AND OBJECTIVE BOX
Chief Complaint/Follow-up on:   no over night events     Subjective:    MEDICATIONS  (STANDING):  amoxicillin  875 milliGRAM(s)/clavulanate 1 Tablet(s) Oral every 12 hours  BACItracin   Ointment 1 Application(s) Topical three times a day  doxycycline hyclate Capsule 100 milliGRAM(s) Oral every 12 hours  enoxaparin Injectable 40 milliGRAM(s) SubCutaneous daily  gabapentin 200 milliGRAM(s) Oral three times a day  insulin glargine Injectable (LANTUS) 40 Unit(s) SubCutaneous at bedtime  insulin lispro (ADMELOG) corrective regimen sliding scale   SubCutaneous three times a day before meals  insulin lispro (ADMELOG) corrective regimen sliding scale   SubCutaneous at bedtime  insulin lispro Injectable (ADMELOG) 4 Unit(s) SubCutaneous three times a day before meals  losartan 100 milliGRAM(s) Oral daily  metoprolol succinate ER 50 milliGRAM(s) Oral daily  polyethylene glycol 3350 17 Gram(s) Oral daily  senna 2 Tablet(s) Oral at bedtime    MEDICATIONS  (PRN):  ketorolac   Injectable 15 milliGRAM(s) IV Push every 6 hours PRN Severe Pain (7 - 10)  oxycodone    5 mG/acetaminophen 325 mG 1 Tablet(s) Oral every 4 hours PRN Severe Pain (7 - 10)      PHYSICAL EXAM:  VITALS: T(C): 36.7 (05-27-21 @ 05:25)  T(F): 98.1 (05-27-21 @ 05:25), Max: 98.3 (05-26-21 @ 20:42)  HR: 87 (05-27-21 @ 05:25) (79 - 108)  BP: 134/79 (05-27-21 @ 05:25) (134/79 - 178/111)  RR:  (18 - 18)  SpO2:  (97% - 99%)  Wt(kg): --  GENERAL: NAD, well-groomed, well-developed  EYES: No proptosis, no injection  HEENT:  Atraumatic, Normocephalic, moist mucous membranes  THYROID: Normal size, no palpable nodules  RESPIRATORY: Clear to auscultation bilaterally; No rales, rhonchi, wheezing, or rubs  CARDIOVASCULAR: Regular rate and rhythm; No murmurs; no peripheral edema  GI: Soft, nontender, non distended, normal bowel sounds  CUSHING'S SIGNS: no striae    POCT Blood Glucose.: 137 mg/dL (05-27-21 @ 07:43)  POCT Blood Glucose.: 154 mg/dL (05-26-21 @ 21:06)  POCT Blood Glucose.: 157 mg/dL (05-26-21 @ 16:06)  POCT Blood Glucose.: 140 mg/dL (05-26-21 @ 11:40)  POCT Blood Glucose.: 236 mg/dL (05-26-21 @ 07:47)  POCT Blood Glucose.: 213 mg/dL (05-25-21 @ 21:10)  POCT Blood Glucose.: 183 mg/dL (05-25-21 @ 16:29)  POCT Blood Glucose.: 166 mg/dL (05-25-21 @ 11:39)  POCT Blood Glucose.: 230 mg/dL (05-25-21 @ 07:52)  POCT Blood Glucose.: 223 mg/dL (05-24-21 @ 21:18)  POCT Blood Glucose.: 195 mg/dL (05-24-21 @ 16:42)    05-26    139  |  104  |  14  ----------------------------<  162<H>  3.9   |  24  |  0.79    EGFR if : 118  EGFR if non : 102    Ca    9.0      05-26  Mg     2.3     05-26  Phos  4.0     05-26    TPro  7.4  /  Alb  2.8<L>  /  TBili  0.4  /  DBili  x   /  AST  15  /  ALT  44  /  AlkPhos  66  05-26          Thyroid Function Tests:  05-22 @ 07:19 TSH 0.92 FreeT4 -- T3 -- Anti TPO -- Anti Thyroglobulin Ab -- TSI --

## 2021-05-27 NOTE — PROGRESS NOTE ADULT - PROBLEM SELECTOR PROBLEM 5
Prophylactic measure
Prophylactic measure
Dyslipidemia
Dyslipidemia
Prophylactic measure
Prophylactic measure

## 2021-05-27 NOTE — PROGRESS NOTE ADULT - PROVIDER SPECIALTY LIST ADULT
Endocrinology
Infectious Disease
Pain Medicine
Internal Medicine
Endocrinology
Internal Medicine
Pain Medicine
Endocrinology
Internal Medicine
Plastic Surgery
Infectious Disease
Infectious Disease
Internal Medicine

## 2021-05-27 NOTE — PROGRESS NOTE ADULT - PROBLEM SELECTOR PLAN 1
Resolving  pt p/w tachycardia and leucocytosis meeting 2 of SIRS criteria  lactate 2.4 now normal  dc cefepime, vanc, changed to augmentin and doxycycline 5/26  continue bacitracin   UA negative  likely due to cellulitis  urine and blood cx NG  ID Dr. Lunsford helping with Antibiotics, rec oral regimen outpatient  ready for dc 5/26

## 2021-05-27 NOTE — PROGRESS NOTE ADULT - PROBLEM SELECTOR PLAN 3
morphine and Percocet today  pain management consulted   miralax and senna  doppler unremarkable   ID Dr Lunsford helping with Abxs.   < from: CT Hand w/ IV Cont, Right (05.22.21 @ 12:09) >  IMPRESSION:  1.  Localized volar hand skin thickening at the level of the third digit MCP joint, presumed to be the site of recent surgery. Underlying enlargement and peripheral enhancement associated with the third digit flexor tendon. The finding may be party postsurgical, however tenosynovitis, reactive or infectious, is suspected. There is surrounding soft tissue edema and phlegmon.    MRI of the hand without and with intravenous contrast may provide a more sensitive evaluation of the soft tissues, better defined the extent of infection. This exam is recommended if there are no clinical contraindications.    Compartment syndrome should be evaluated for on a clinical basis.  2.  Volar to the distal radius is a 1.5 cm localized fluid collection, likely representing a volar ganglion cyst given this is at a distance from the surgical site, although abscess would have a similar appearance on CT.  Plastics helping and no surgical intervention.
morphine and Percocet today  pain management consulted   miralax and senna  doppler unremarkable   ID Dr Lunsford helping with Abxs.   < from: CT Hand w/ IV Cont, Right (05.22.21 @ 12:09) >  IMPRESSION:  1.  Localized volar hand skin thickening at the level of the third digit MCP joint, presumed to be the site of recent surgery. Underlying enlargement and peripheral enhancement associated with the third digit flexor tendon. The finding may be party postsurgical, however tenosynovitis, reactive or infectious, is suspected. There is surrounding soft tissue edema and phlegmon.    MRI of the hand without and with intravenous contrast may provide a more sensitive evaluation of the soft tissues, better defined the extent of infection. This exam is recommended if there are no clinical contraindications.    Compartment syndrome should be evaluated for on a clinical basis.  2.  Volar to the distal radius is a 1.5 cm localized fluid collection, likely representing a volar ganglion cyst given this is at a distance from the surgical site, although abscess would have a similar appearance on CT.  Plastics helping and no surgical intervention.
continue percocet, gabapentin, toradol  pain management consulted   miralax and senna  doppler unremarkable   ID Dr Lunsford helping with Abxs.   < from: CT Hand w/ IV Cont, Right (05.22.21 @ 12:09) >  IMPRESSION:  1.  Localized volar hand skin thickening at the level of the third digit MCP joint, presumed to be the site of recent surgery. Underlying enlargement and peripheral enhancement associated with the third digit flexor tendon. The finding may be party postsurgical, however tenosynovitis, reactive or infectious, is suspected. There is surrounding soft tissue edema and phlegmon.    MRI of the hand without and with intravenous contrast may provide a more sensitive evaluation of the soft tissues, better defined the extent of infection. This exam is recommended if there are no clinical contraindications.    Compartment syndrome should be evaluated for on a clinical basis.  2.  Volar to the distal radius is a 1.5 cm localized fluid collection, likely representing a volar ganglion cyst given this is at a distance from the surgical site, although abscess would have a similar appearance on CT.  Plastics helping and no surgical intervention.
morphine and Percocet today  pain management consulted   miralax and senna  doppler unremarkable   ID Dr Lunsford helping with Abxs.   < from: CT Hand w/ IV Cont, Right (05.22.21 @ 12:09) >  IMPRESSION:  1.  Localized volar hand skin thickening at the level of the third digit MCP joint, presumed to be the site of recent surgery. Underlying enlargement and peripheral enhancement associated with the third digit flexor tendon. The finding may be party postsurgical, however tenosynovitis, reactive or infectious, is suspected. There is surrounding soft tissue edema and phlegmon.    MRI of the hand without and with intravenous contrast may provide a more sensitive evaluation of the soft tissues, better defined the extent of infection. This exam is recommended if there are no clinical contraindications.    Compartment syndrome should be evaluated for on a clinical basis.  2.  Volar to the distal radius is a 1.5 cm localized fluid collection, likely representing a volar ganglion cyst given this is at a distance from the surgical site, although abscess would have a similar appearance on CT.  Plastics helping and no surgical intervention.

## 2021-05-27 NOTE — PROGRESS NOTE ADULT - PROBLEM SELECTOR PROBLEM 2
Cellulitis of hand, right
Cellulitis of hand, right
Diabetes mellitus

## 2021-05-28 NOTE — ED ADULT TRIAGE NOTE - SOURCE OF INFORMATION
"  CC:  Tick bite       While putting sone to bed - noted a tick bite on his scalp - may have been there about 24 hrs or so  Was able to safely and completed  remove      Does have red \"spot\" at the site of bite but no other rashes or other skin issues       Not fat or swollen       A/P:   > OK to watch for now - call back if rashes or fevers develop or if wound worsens     > Wash wound - apply top. ABX cream as needed           Dirk Lewis RN   Triage and Medication Refills        Reason for Disposition    Deer tick bite with no complications    Protocols used: TICK BITE-P-      "
Patient

## 2021-06-04 ENCOUNTER — INPATIENT (INPATIENT)
Facility: HOSPITAL | Age: 56
LOS: 3 days | Discharge: ANOTHER HOSPITAL | End: 2021-06-08
Attending: STUDENT IN AN ORGANIZED HEALTH CARE EDUCATION/TRAINING PROGRAM | Admitting: STUDENT IN AN ORGANIZED HEALTH CARE EDUCATION/TRAINING PROGRAM
Payer: MEDICAID

## 2021-06-04 VITALS
OXYGEN SATURATION: 100 % | RESPIRATION RATE: 17 BRPM | DIASTOLIC BLOOD PRESSURE: 96 MMHG | SYSTOLIC BLOOD PRESSURE: 158 MMHG | HEART RATE: 104 BPM | TEMPERATURE: 98 F | HEIGHT: 66 IN

## 2021-06-04 DIAGNOSIS — S96.912S STRAIN OF UNSPECIFIED MUSCLE AND TENDON AT ANKLE AND FOOT LEVEL, LEFT FOOT, SEQUELA: Chronic | ICD-10-CM

## 2021-06-04 DIAGNOSIS — K60.3 ANAL FISTULA: Chronic | ICD-10-CM

## 2021-06-04 LAB
A1C WITH ESTIMATED AVERAGE GLUCOSE RESULT: 11 % — HIGH (ref 4–5.6)
ALBUMIN SERPL ELPH-MCNC: 3.9 G/DL — SIGNIFICANT CHANGE UP (ref 3.3–5)
ALP SERPL-CCNC: 74 U/L — SIGNIFICANT CHANGE UP (ref 40–120)
ALT FLD-CCNC: 17 U/L — SIGNIFICANT CHANGE UP (ref 4–41)
ANION GAP SERPL CALC-SCNC: 12 MMOL/L — SIGNIFICANT CHANGE UP (ref 7–14)
AST SERPL-CCNC: 8 U/L — SIGNIFICANT CHANGE UP (ref 4–40)
BASE EXCESS BLDV CALC-SCNC: 5.5 MMOL/L — HIGH (ref -3–2)
BASOPHILS # BLD AUTO: 0.05 K/UL — SIGNIFICANT CHANGE UP (ref 0–0.2)
BASOPHILS NFR BLD AUTO: 0.5 % — SIGNIFICANT CHANGE UP (ref 0–2)
BILIRUB SERPL-MCNC: 0.6 MG/DL — SIGNIFICANT CHANGE UP (ref 0.2–1.2)
BLOOD GAS VENOUS - CREATININE: 0.8 MG/DL — SIGNIFICANT CHANGE UP (ref 0.5–1.3)
BLOOD GAS VENOUS COMPREHENSIVE RESULT: SIGNIFICANT CHANGE UP
BUN SERPL-MCNC: 16 MG/DL — SIGNIFICANT CHANGE UP (ref 7–23)
CALCIUM SERPL-MCNC: 10 MG/DL — SIGNIFICANT CHANGE UP (ref 8.4–10.5)
CHLORIDE BLDV-SCNC: 107 MMOL/L — SIGNIFICANT CHANGE UP (ref 96–108)
CHLORIDE SERPL-SCNC: 97 MMOL/L — LOW (ref 98–107)
CO2 SERPL-SCNC: 28 MMOL/L — SIGNIFICANT CHANGE UP (ref 22–31)
CREAT SERPL-MCNC: 0.86 MG/DL — SIGNIFICANT CHANGE UP (ref 0.5–1.3)
CRP SERPL-MCNC: 17.7 MG/L — HIGH
EOSINOPHIL # BLD AUTO: 0.04 K/UL — SIGNIFICANT CHANGE UP (ref 0–0.5)
EOSINOPHIL NFR BLD AUTO: 0.4 % — SIGNIFICANT CHANGE UP (ref 0–6)
ERYTHROCYTE [SEDIMENTATION RATE] IN BLOOD: 44 MM/HR — HIGH (ref 1–15)
ESTIMATED AVERAGE GLUCOSE: 269 MG/DL — HIGH (ref 68–114)
GAS PNL BLDV: 136 MMOL/L — SIGNIFICANT CHANGE UP (ref 136–146)
GLUCOSE BLDV-MCNC: 179 MG/DL — HIGH (ref 70–99)
GLUCOSE SERPL-MCNC: 242 MG/DL — HIGH (ref 70–99)
HCO3 BLDV-SCNC: 28 MMOL/L — HIGH (ref 20–27)
HCT VFR BLD CALC: 40 % — SIGNIFICANT CHANGE UP (ref 39–50)
HCT VFR BLDA CALC: 37.8 % — LOW (ref 39–51)
HGB BLD CALC-MCNC: 12.3 G/DL — LOW (ref 13–17)
HGB BLD-MCNC: 12.9 G/DL — LOW (ref 13–17)
IANC: 6.85 K/UL — SIGNIFICANT CHANGE UP (ref 1.5–8.5)
IMM GRANULOCYTES NFR BLD AUTO: 0.5 % — SIGNIFICANT CHANGE UP (ref 0–1.5)
LACTATE BLDV-MCNC: 2.1 MMOL/L — HIGH (ref 0.5–2)
LYMPHOCYTES # BLD AUTO: 1.99 K/UL — SIGNIFICANT CHANGE UP (ref 1–3.3)
LYMPHOCYTES # BLD AUTO: 20.4 % — SIGNIFICANT CHANGE UP (ref 13–44)
MCHC RBC-ENTMCNC: 26.8 PG — LOW (ref 27–34)
MCHC RBC-ENTMCNC: 32.3 GM/DL — SIGNIFICANT CHANGE UP (ref 32–36)
MCV RBC AUTO: 83.2 FL — SIGNIFICANT CHANGE UP (ref 80–100)
MONOCYTES # BLD AUTO: 0.76 K/UL — SIGNIFICANT CHANGE UP (ref 0–0.9)
MONOCYTES NFR BLD AUTO: 7.8 % — SIGNIFICANT CHANGE UP (ref 2–14)
NEUTROPHILS # BLD AUTO: 6.85 K/UL — SIGNIFICANT CHANGE UP (ref 1.8–7.4)
NEUTROPHILS NFR BLD AUTO: 70.4 % — SIGNIFICANT CHANGE UP (ref 43–77)
NRBC # BLD: 0 /100 WBCS — SIGNIFICANT CHANGE UP
NRBC # FLD: 0 K/UL — SIGNIFICANT CHANGE UP
PCO2 BLDV: 49 MMHG — SIGNIFICANT CHANGE UP (ref 41–51)
PH BLDV: 7.4 — SIGNIFICANT CHANGE UP (ref 7.32–7.43)
PLATELET # BLD AUTO: 508 K/UL — HIGH (ref 150–400)
PO2 BLDV: 27 MMHG — LOW (ref 35–40)
POTASSIUM BLDV-SCNC: 3 MMOL/L — LOW (ref 3.4–4.5)
POTASSIUM SERPL-MCNC: 3.7 MMOL/L — SIGNIFICANT CHANGE UP (ref 3.5–5.3)
POTASSIUM SERPL-SCNC: 3.7 MMOL/L — SIGNIFICANT CHANGE UP (ref 3.5–5.3)
PROCALCITONIN SERPL-MCNC: 0.1 NG/ML — SIGNIFICANT CHANGE UP (ref 0.02–0.1)
PROT SERPL-MCNC: 7.5 G/DL — SIGNIFICANT CHANGE UP (ref 6–8.3)
RBC # BLD: 4.81 M/UL — SIGNIFICANT CHANGE UP (ref 4.2–5.8)
RBC # FLD: 11.9 % — SIGNIFICANT CHANGE UP (ref 10.3–14.5)
SAO2 % BLDV: 44.2 % — LOW (ref 60–85)
SODIUM SERPL-SCNC: 137 MMOL/L — SIGNIFICANT CHANGE UP (ref 135–145)
WBC # BLD: 9.74 K/UL — SIGNIFICANT CHANGE UP (ref 3.8–10.5)
WBC # FLD AUTO: 9.74 K/UL — SIGNIFICANT CHANGE UP (ref 3.8–10.5)

## 2021-06-04 PROCEDURE — 73110 X-RAY EXAM OF WRIST: CPT | Mod: 26,RT

## 2021-06-04 PROCEDURE — 99220: CPT

## 2021-06-04 PROCEDURE — 73130 X-RAY EXAM OF HAND: CPT | Mod: 26,RT

## 2021-06-04 RX ORDER — INSULIN LISPRO 100/ML
VIAL (ML) SUBCUTANEOUS AT BEDTIME
Refills: 0 | Status: DISCONTINUED | OUTPATIENT
Start: 2021-06-04 | End: 2021-06-08

## 2021-06-04 RX ORDER — METOPROLOL TARTRATE 50 MG
100 TABLET ORAL DAILY
Refills: 0 | Status: DISCONTINUED | OUTPATIENT
Start: 2021-06-04 | End: 2021-06-04

## 2021-06-04 RX ORDER — SODIUM CHLORIDE 9 MG/ML
1000 INJECTION, SOLUTION INTRAVENOUS
Refills: 0 | Status: DISCONTINUED | OUTPATIENT
Start: 2021-06-04 | End: 2021-06-08

## 2021-06-04 RX ORDER — VANCOMYCIN HCL 1 G
1000 VIAL (EA) INTRAVENOUS
Refills: 0 | Status: DISCONTINUED | OUTPATIENT
Start: 2021-06-04 | End: 2021-06-05

## 2021-06-04 RX ORDER — OXYCODONE AND ACETAMINOPHEN 5; 325 MG/1; MG/1
1 TABLET ORAL EVERY 4 HOURS
Refills: 0 | Status: DISCONTINUED | OUTPATIENT
Start: 2021-06-04 | End: 2021-06-05

## 2021-06-04 RX ORDER — IBUPROFEN 200 MG
1 TABLET ORAL
Qty: 21 | Refills: 0
Start: 2021-06-04 | End: 2021-06-10

## 2021-06-04 RX ORDER — CEFEPIME 1 G/1
1000 INJECTION, POWDER, FOR SOLUTION INTRAMUSCULAR; INTRAVENOUS ONCE
Refills: 0 | Status: COMPLETED | OUTPATIENT
Start: 2021-06-04 | End: 2021-06-04

## 2021-06-04 RX ORDER — KETOROLAC TROMETHAMINE 30 MG/ML
15 SYRINGE (ML) INJECTION ONCE
Refills: 0 | Status: DISCONTINUED | OUTPATIENT
Start: 2021-06-04 | End: 2021-06-04

## 2021-06-04 RX ORDER — DEXTROSE 50 % IN WATER 50 %
25 SYRINGE (ML) INTRAVENOUS ONCE
Refills: 0 | Status: DISCONTINUED | OUTPATIENT
Start: 2021-06-04 | End: 2021-06-08

## 2021-06-04 RX ORDER — DEXTROSE 50 % IN WATER 50 %
15 SYRINGE (ML) INTRAVENOUS ONCE
Refills: 0 | Status: DISCONTINUED | OUTPATIENT
Start: 2021-06-04 | End: 2021-06-08

## 2021-06-04 RX ORDER — HYDROMORPHONE HYDROCHLORIDE 2 MG/ML
1 INJECTION INTRAMUSCULAR; INTRAVENOUS; SUBCUTANEOUS ONCE
Refills: 0 | Status: DISCONTINUED | OUTPATIENT
Start: 2021-06-04 | End: 2021-06-04

## 2021-06-04 RX ORDER — METOPROLOL TARTRATE 50 MG
100 TABLET ORAL DAILY
Refills: 0 | Status: DISCONTINUED | OUTPATIENT
Start: 2021-06-04 | End: 2021-06-08

## 2021-06-04 RX ORDER — GABAPENTIN 400 MG/1
400 CAPSULE ORAL ONCE
Refills: 0 | Status: COMPLETED | OUTPATIENT
Start: 2021-06-04 | End: 2021-06-04

## 2021-06-04 RX ORDER — GLUCAGON INJECTION, SOLUTION 0.5 MG/.1ML
1 INJECTION, SOLUTION SUBCUTANEOUS ONCE
Refills: 0 | Status: DISCONTINUED | OUTPATIENT
Start: 2021-06-04 | End: 2021-06-08

## 2021-06-04 RX ORDER — DEXTROSE 50 % IN WATER 50 %
12.5 SYRINGE (ML) INTRAVENOUS ONCE
Refills: 0 | Status: DISCONTINUED | OUTPATIENT
Start: 2021-06-04 | End: 2021-06-08

## 2021-06-04 RX ORDER — KETOROLAC TROMETHAMINE 30 MG/ML
30 SYRINGE (ML) INJECTION EVERY 6 HOURS
Refills: 0 | Status: DISCONTINUED | OUTPATIENT
Start: 2021-06-04 | End: 2021-06-05

## 2021-06-04 RX ORDER — INSULIN LISPRO 100/ML
VIAL (ML) SUBCUTANEOUS
Refills: 0 | Status: DISCONTINUED | OUTPATIENT
Start: 2021-06-04 | End: 2021-06-08

## 2021-06-04 RX ORDER — SODIUM CHLORIDE 9 MG/ML
1000 INJECTION INTRAMUSCULAR; INTRAVENOUS; SUBCUTANEOUS ONCE
Refills: 0 | Status: COMPLETED | OUTPATIENT
Start: 2021-06-04 | End: 2021-06-04

## 2021-06-04 RX ORDER — VANCOMYCIN HCL 1 G
1000 VIAL (EA) INTRAVENOUS ONCE
Refills: 0 | Status: COMPLETED | OUTPATIENT
Start: 2021-06-04 | End: 2021-06-04

## 2021-06-04 RX ORDER — CEFEPIME 1 G/1
1000 INJECTION, POWDER, FOR SOLUTION INTRAMUSCULAR; INTRAVENOUS EVERY 8 HOURS
Refills: 0 | Status: DISCONTINUED | OUTPATIENT
Start: 2021-06-05 | End: 2021-06-05

## 2021-06-04 RX ADMIN — Medication 30 MILLIGRAM(S): at 22:04

## 2021-06-04 RX ADMIN — SODIUM CHLORIDE 1000 MILLILITER(S): 9 INJECTION INTRAMUSCULAR; INTRAVENOUS; SUBCUTANEOUS at 13:56

## 2021-06-04 RX ADMIN — CEFEPIME 1000 MILLIGRAM(S): 1 INJECTION, POWDER, FOR SOLUTION INTRAMUSCULAR; INTRAVENOUS at 20:54

## 2021-06-04 RX ADMIN — OXYCODONE AND ACETAMINOPHEN 1 TABLET(S): 5; 325 TABLET ORAL at 21:10

## 2021-06-04 RX ADMIN — Medication 250 MILLIGRAM(S): at 18:30

## 2021-06-04 RX ADMIN — Medication 100 MILLIGRAM(S): at 21:09

## 2021-06-04 RX ADMIN — Medication 15 MILLIGRAM(S): at 13:19

## 2021-06-04 RX ADMIN — Medication 1000 MILLIGRAM(S): at 20:54

## 2021-06-04 RX ADMIN — OXYCODONE AND ACETAMINOPHEN 1 TABLET(S): 5; 325 TABLET ORAL at 22:04

## 2021-06-04 RX ADMIN — HYDROMORPHONE HYDROCHLORIDE 1 MILLIGRAM(S): 2 INJECTION INTRAMUSCULAR; INTRAVENOUS; SUBCUTANEOUS at 17:35

## 2021-06-04 RX ADMIN — GABAPENTIN 400 MILLIGRAM(S): 400 CAPSULE ORAL at 17:35

## 2021-06-04 RX ADMIN — HYDROMORPHONE HYDROCHLORIDE 1 MILLIGRAM(S): 2 INJECTION INTRAMUSCULAR; INTRAVENOUS; SUBCUTANEOUS at 18:08

## 2021-06-04 RX ADMIN — Medication 15 MILLIGRAM(S): at 13:56

## 2021-06-04 RX ADMIN — HYDROMORPHONE HYDROCHLORIDE 1 MILLIGRAM(S): 2 INJECTION INTRAMUSCULAR; INTRAVENOUS; SUBCUTANEOUS at 23:30

## 2021-06-04 RX ADMIN — CEFEPIME 100 MILLIGRAM(S): 1 INJECTION, POWDER, FOR SOLUTION INTRAMUSCULAR; INTRAVENOUS at 17:34

## 2021-06-04 RX ADMIN — HYDROMORPHONE HYDROCHLORIDE 1 MILLIGRAM(S): 2 INJECTION INTRAMUSCULAR; INTRAVENOUS; SUBCUTANEOUS at 22:13

## 2021-06-04 RX ADMIN — Medication 30 MILLIGRAM(S): at 21:09

## 2021-06-04 NOTE — ED PROVIDER NOTE - PROGRESS NOTE DETAILS
Pt w/ continued pain, elevated inflammatory markers. Will give pain medications, abx, place in CDU for plastics evaluation. Pt w/ continued pain, elevated inflammatory markers. Will give pain medications, abx, place in CDU for plastics evaluation. MRI, ID ARLEEN Epps: Pt to stay in CDU pt to be seen by Hand Dr Henry and ID.  CDU to follow up consult.

## 2021-06-04 NOTE — ED CDU PROVIDER INITIAL DAY NOTE - MEDICAL DECISION MAKING DETAILS
56 year old male with PMH of DM and HTN presents to the ED with persistent right hand pain s/p hand surgery 5/17 for Dupuytren contracture. Pt was recently admitted at [facility] for cellulitis of the right hand. Pt is placed in CDU for MRI of the right hand, IV abx, analgesics and ID and Hand consult.

## 2021-06-04 NOTE — ED PROVIDER NOTE - OBJECTIVE STATEMENT
55 y/o male with a hx of DM, HTN presents to the ER c/o right hand pain since 5/7 s/p surgery to right hand for Dupuytren's contracture at [facility].  Pt states he returned back to his hand surgery on 5/19 to have sutures removed and states the surgeon stuck a nerve and since then his pain has increased and reports decreased ROM.  Pt was admitted at [facility] on 5/21 for IV antibiotics.  Pt denies fevers, chills, discharge, weakness, numbness, tingling.  Pt was written for splints by his Hand Surgeon but states he has not started to wear them yet.

## 2021-06-04 NOTE — ED CDU PROVIDER INITIAL DAY NOTE - OBJECTIVE STATEMENT
Initial ED provider note: 57 y/o p/w continued right hand pain.  Pt states he has had constant pain despite pain medication since having sutures removed. He states pain is 8/10 sharp lancing located in the volars aspect of the hand going from the wrist to the fingers. pain improves with motrin 400mg mildly and gabapentin. Pts. symptoms do not improve with oxycodone. he has appointment with hand surery for june 16th. He deneis fever, +chills, nausea, vomiting. pain currently 6/10. last took motrin in am.  Pt was recently admitted to [facility] w/ cellulitis s/p cefepine, augmentin and doxy/ w/ completion of course.   Pt w/ continued pain in right wrist . IN ed pt found to have elevated inflammatory markers continued pain in Right wrist. Placed in cdu for MRI , Plastics and ID evaluation pain control, iv abx.    CDU note: Agree with above. 56 year old male with PMH of DM and HTN presents to the ED with persistent right hand pain s/p hand surgery 5/17 for Dupuytren contracture. Denies fevers and chills. Denies any other complaints.

## 2021-06-04 NOTE — ED PROVIDER NOTE - PHYSICAL EXAMINATION
Right hand: 2+ pulses, pt unable to make a complete fist, surgical site on coughlin aspect well healing, no redness, no discharge, minimal swelling.  Sensation intact.

## 2021-06-04 NOTE — ED CDU PROVIDER INITIAL DAY NOTE - ATTENDING CONTRIBUTION TO CARE
57 y/o M with PMH DM , tendon rupture s/p right hand surgery on 5/7 p/w continued right hand pain.  Pt states he has had constant pain despite pain medication since having sutures removed. He states pain is 8/10 sharp lancing located in the volars aspect of the hand going from the wrist to the fingers. pain improves with motrin 400mg mildly and gabapentin. Pts. symptoms do not improve with oxycodone. he has appointment with hand surery for june 16th. He deneis fever, +chills, nausea, vomiting. pain currently 6/10. last took motrin in am.  Pt was recently admitted to [facility] w/ cellulitis s/p cefepine, augmentin and doxy/ w/ completion of course.   Pt w/ continued pain in right wrist . IN ed pt found to have elevated inflammatory markers continued pain in Right wrist. Placed in cdu for MRI , Plastics and ID evaluation pain control, iv abx   denies fever, +chills, chest pain, SOB, abdominal pain, diarrhea, dysuria, syncope, bleeding, new rash,weakness, numbness, blurred vision  + right pain   ROS  otherwise negative as per HPI  Gen: Awake, Alert, WD, WN, NAD  Head:  NC/AT  Eyes:  PERRL, EOMI, Conjunctiva pink, lids normal, no scleral icterus  ENT: OP clear, no exudates, no erythema, uvula midline, TMs clear bilaterally, moist mucus membranes  Neck: supple, nontender, no meningismus, no JVD, trachea midline  Cardiac/CV:  S1 S2, RRR, no M/G/R  Respiratory/Pulm:  CTAB, good air movement, normal resp effort, no wheezes/stridor/retractions/rales/rhonchi  Gastrointestinal/Abdomen:  Soft, nontender, nondistended, +BS, no rebound/guarding  Back:  no CVAT, no MLT  Ext:  warm, well perfused, moving all extremities spontaneously, no peripheral edema, distal pulses intact  Skin: granullation tissue over right volar aspect of hand ,   Neuro:  AAOx3, sensation intact, motor 5/5 x 4 extremities, normal gait, speech clear. decreased strenght in right hand in digits 4-5   CDU plan of care  MRI hand w/ contrast  hand surgery  pain control  abx   ID consult

## 2021-06-04 NOTE — ED ADULT NURSE NOTE - OBJECTIVE STATEMENT
Pt arriving to intake 12 A&OX4 ambulatory c/o right hand pain s/p tendon sx last month. Site appears dry and intact, no redness/discharge noted. +ROM to RUE. Breathing even and unlabored. 20g IV placed in LAC. Labs drawn and sent. Medicated as per EMAR. Awaiting XR

## 2021-06-04 NOTE — ED ADULT NURSE REASSESSMENT NOTE - [facility] ED NURSE REASSESS COMMENT FT1
Pt. has c/o increasing RLQ abdominal pain and is visibly uncomfortable. MD Sanders notified, MD paged GYN and ER resident Dona made aware.

## 2021-06-04 NOTE — ED ADULT TRIAGE NOTE - CHIEF COMPLAINT QUOTE
Pt s/p right hand surgery on 5/7, pt states when having sutures removed 2 weeks later he was "stuck in nerve" Pt states he has had constant pain despite pain medication since. Pt also endorses limited mobility to right hand. +pulse

## 2021-06-04 NOTE — ED CDU PROVIDER INITIAL DAY NOTE - FAMILY HISTORY
Mother  Still living? Unknown  Family history of hypertension, Age at diagnosis: Age Unknown  Family history of diabetic complications, Age at diagnosis: Age Unknown  Family history of hyperglycemia, Age at diagnosis: Age Unknown  Family history of hypertension, Age at diagnosis: Age Unknown

## 2021-06-04 NOTE — ED PROVIDER NOTE - ATTENDING CONTRIBUTION TO CARE
57 y/o M with PMH DM , tendon rupture s/p right hand surgery on 5/7 p/w continued right hand pain.  Pt states he has had constant pain despite pain medication since having sutures removed. He states pain is 8/10 sharp lancing located in the volars aspect of the hand going from the wrist to the fingers. pain improves with motrin 400mg mildly and gabapentin. Pts. symptoms do not improve with oxycodone. he has appointment with hand surery for june 16th. He deneis fever, +chills, nausea, vomiting. pain currently 6/10. last took motrin in am.  Pt was recently admitted to [facility] w/ cellulitis s/p cefepine, augmentin and doxy/ w/ completion of course.   Pt w/ continued pain in right wrist  denies fever, +chills, chest pain, SOB, abdominal pain, diarrhea, dysuria, syncope, bleeding, new rash,weakness, numbness, blurred vision  + right pain   ROS  otherwise negative as per HPI  Gen: Awake, Alert, WD, WN, NAD  Head:  NC/AT  Eyes:  PERRL, EOMI, Conjunctiva pink, lids normal, no scleral icterus  ENT: OP clear, no exudates, no erythema, uvula midline, TMs clear bilaterally, moist mucus membranes  Neck: supple, nontender, no meningismus, no JVD, trachea midline  Cardiac/CV:  S1 S2, RRR, no M/G/R  Respiratory/Pulm:  CTAB, good air movement, normal resp effort, no wheezes/stridor/retractions/rales/rhonchi  Gastrointestinal/Abdomen:  Soft, nontender, nondistended, +BS, no rebound/guarding  Back:  no CVAT, no MLT  Ext:  warm, well perfused, moving all extremities spontaneously, no peripheral edema, distal pulses intact  Skin: granullation tissue over right volar aspect of hand ,   Neuro:  AAOx3, sensation intact, motor 5/5 x 4 extremities, normal gait, speech clear. decreased strenght in right hand in digits 4-5   MDM as above

## 2021-06-04 NOTE — ED PROVIDER NOTE - CLINICAL SUMMARY MEDICAL DECISION MAKING FREE TEXT BOX
57 y/o male with a hx of DM, HTN presents to the ER c/o right hand pain since 5/7 s/p surgery to right hand for Dupuytren's contracture at [facility].  Pt states he returned back to his hand surgery on 5/19 to have sutures removed and states the surgeon stuck a nerve and since then his pain has increased and reports decreased ROM.  Pt was admitted at [facility] on 5/21 for IV antibiotics.  Pt is well appearing, NAD, no sign of infection, will check labs, xray, follow up PMD, Hand, Neurology. 55 y/o male with a hx of DM, HTN presents to the ER c/o right hand pain since 5/7 s/p surgery to right hand for Dupuytren's contracture at [facility].  Pt states he returned back to his hand surgery on 5/19 to have sutures removed and states the surgeon stuck a nerve and since then his pain has increased and reports decreased ROM.  Pt was admitted at [facility] on 5/21 for IV antibiotics.  Pt is well appearing, NAD, no sign of infection, will check labs, xray,

## 2021-06-05 DIAGNOSIS — E11.65 TYPE 2 DIABETES MELLITUS WITH HYPERGLYCEMIA: ICD-10-CM

## 2021-06-05 DIAGNOSIS — M79.641 PAIN IN RIGHT HAND: ICD-10-CM

## 2021-06-05 DIAGNOSIS — M72.0 PALMAR FASCIAL FIBROMATOSIS [DUPUYTREN]: ICD-10-CM

## 2021-06-05 DIAGNOSIS — Z29.9 ENCOUNTER FOR PROPHYLACTIC MEASURES, UNSPECIFIED: ICD-10-CM

## 2021-06-05 LAB
ANION GAP SERPL CALC-SCNC: 13 MMOL/L — SIGNIFICANT CHANGE UP (ref 7–14)
BASOPHILS # BLD AUTO: 0.09 K/UL — SIGNIFICANT CHANGE UP (ref 0–0.2)
BASOPHILS NFR BLD AUTO: 1 % — SIGNIFICANT CHANGE UP (ref 0–2)
BLOOD GAS VENOUS COMPREHENSIVE RESULT: SIGNIFICANT CHANGE UP
BUN SERPL-MCNC: 17 MG/DL — SIGNIFICANT CHANGE UP (ref 7–23)
CALCIUM SERPL-MCNC: 8.7 MG/DL — SIGNIFICANT CHANGE UP (ref 8.4–10.5)
CHLORIDE SERPL-SCNC: 99 MMOL/L — SIGNIFICANT CHANGE UP (ref 98–107)
CO2 SERPL-SCNC: 24 MMOL/L — SIGNIFICANT CHANGE UP (ref 22–31)
CREAT SERPL-MCNC: 0.91 MG/DL — SIGNIFICANT CHANGE UP (ref 0.5–1.3)
EOSINOPHIL # BLD AUTO: 0.1 K/UL — SIGNIFICANT CHANGE UP (ref 0–0.5)
EOSINOPHIL NFR BLD AUTO: 1.2 % — SIGNIFICANT CHANGE UP (ref 0–6)
GLUCOSE BLDC GLUCOMTR-MCNC: 169 MG/DL — HIGH (ref 70–99)
GLUCOSE BLDC GLUCOMTR-MCNC: 261 MG/DL — HIGH (ref 70–99)
GLUCOSE SERPL-MCNC: 195 MG/DL — HIGH (ref 70–99)
HCT VFR BLD CALC: 36.3 % — LOW (ref 39–50)
HGB BLD-MCNC: 12 G/DL — LOW (ref 13–17)
IANC: 5.24 K/UL — SIGNIFICANT CHANGE UP (ref 1.5–8.5)
IMM GRANULOCYTES NFR BLD AUTO: 0.6 % — SIGNIFICANT CHANGE UP (ref 0–1.5)
LYMPHOCYTES # BLD AUTO: 2.42 K/UL — SIGNIFICANT CHANGE UP (ref 1–3.3)
LYMPHOCYTES # BLD AUTO: 27.9 % — SIGNIFICANT CHANGE UP (ref 13–44)
MCHC RBC-ENTMCNC: 27.3 PG — SIGNIFICANT CHANGE UP (ref 27–34)
MCHC RBC-ENTMCNC: 33.1 GM/DL — SIGNIFICANT CHANGE UP (ref 32–36)
MCV RBC AUTO: 82.5 FL — SIGNIFICANT CHANGE UP (ref 80–100)
MONOCYTES # BLD AUTO: 0.77 K/UL — SIGNIFICANT CHANGE UP (ref 0–0.9)
MONOCYTES NFR BLD AUTO: 8.9 % — SIGNIFICANT CHANGE UP (ref 2–14)
NEUTROPHILS # BLD AUTO: 5.24 K/UL — SIGNIFICANT CHANGE UP (ref 1.8–7.4)
NEUTROPHILS NFR BLD AUTO: 60.4 % — SIGNIFICANT CHANGE UP (ref 43–77)
NRBC # BLD: 0 /100 WBCS — SIGNIFICANT CHANGE UP
NRBC # FLD: 0 K/UL — SIGNIFICANT CHANGE UP
PLATELET # BLD AUTO: 469 K/UL — HIGH (ref 150–400)
POTASSIUM SERPL-MCNC: 3.6 MMOL/L — SIGNIFICANT CHANGE UP (ref 3.5–5.3)
POTASSIUM SERPL-SCNC: 3.6 MMOL/L — SIGNIFICANT CHANGE UP (ref 3.5–5.3)
RBC # BLD: 4.4 M/UL — SIGNIFICANT CHANGE UP (ref 4.2–5.8)
RBC # FLD: 11.9 % — SIGNIFICANT CHANGE UP (ref 10.3–14.5)
SARS-COV-2 RNA SPEC QL NAA+PROBE: SIGNIFICANT CHANGE UP
SODIUM SERPL-SCNC: 136 MMOL/L — SIGNIFICANT CHANGE UP (ref 135–145)
WBC # BLD: 8.67 K/UL — SIGNIFICANT CHANGE UP (ref 3.8–10.5)
WBC # FLD AUTO: 8.67 K/UL — SIGNIFICANT CHANGE UP (ref 3.8–10.5)

## 2021-06-05 PROCEDURE — 99254 IP/OBS CNSLTJ NEW/EST MOD 60: CPT | Mod: GC

## 2021-06-05 PROCEDURE — 99217: CPT

## 2021-06-05 PROCEDURE — 99223 1ST HOSP IP/OBS HIGH 75: CPT

## 2021-06-05 RX ORDER — SENNA PLUS 8.6 MG/1
2 TABLET ORAL AT BEDTIME
Refills: 0 | Status: DISCONTINUED | OUTPATIENT
Start: 2021-06-05 | End: 2021-06-08

## 2021-06-05 RX ORDER — HYDROMORPHONE HYDROCHLORIDE 2 MG/ML
0.5 INJECTION INTRAMUSCULAR; INTRAVENOUS; SUBCUTANEOUS EVERY 12 HOURS
Refills: 0 | Status: DISCONTINUED | OUTPATIENT
Start: 2021-06-05 | End: 2021-06-07

## 2021-06-05 RX ORDER — INSULIN GLARGINE 100 [IU]/ML
12 INJECTION, SOLUTION SUBCUTANEOUS AT BEDTIME
Refills: 0 | Status: DISCONTINUED | OUTPATIENT
Start: 2021-06-05 | End: 2021-06-08

## 2021-06-05 RX ORDER — KETOROLAC TROMETHAMINE 30 MG/ML
30 SYRINGE (ML) INJECTION EVERY 6 HOURS
Refills: 0 | Status: DISCONTINUED | OUTPATIENT
Start: 2021-06-05 | End: 2021-06-06

## 2021-06-05 RX ORDER — HYDROMORPHONE HYDROCHLORIDE 2 MG/ML
2 INJECTION INTRAMUSCULAR; INTRAVENOUS; SUBCUTANEOUS EVERY 6 HOURS
Refills: 0 | Status: DISCONTINUED | OUTPATIENT
Start: 2021-06-05 | End: 2021-06-07

## 2021-06-05 RX ORDER — HYDROMORPHONE HYDROCHLORIDE 2 MG/ML
1 INJECTION INTRAMUSCULAR; INTRAVENOUS; SUBCUTANEOUS ONCE
Refills: 0 | Status: DISCONTINUED | OUTPATIENT
Start: 2021-06-05 | End: 2021-06-05

## 2021-06-05 RX ORDER — INSULIN GLARGINE 100 [IU]/ML
20 INJECTION, SOLUTION SUBCUTANEOUS AT BEDTIME
Refills: 0 | Status: DISCONTINUED | OUTPATIENT
Start: 2021-06-05 | End: 2021-06-05

## 2021-06-05 RX ORDER — INSULIN LISPRO 100/ML
4 VIAL (ML) SUBCUTANEOUS
Refills: 0 | Status: DISCONTINUED | OUTPATIENT
Start: 2021-06-05 | End: 2021-06-05

## 2021-06-05 RX ORDER — GABAPENTIN 400 MG/1
300 CAPSULE ORAL THREE TIMES A DAY
Refills: 0 | Status: DISCONTINUED | OUTPATIENT
Start: 2021-06-05 | End: 2021-06-05

## 2021-06-05 RX ORDER — ACETAMINOPHEN 500 MG
650 TABLET ORAL EVERY 6 HOURS
Refills: 0 | Status: COMPLETED | OUTPATIENT
Start: 2021-06-05 | End: 2021-06-07

## 2021-06-05 RX ORDER — INSULIN LISPRO 100/ML
2 VIAL (ML) SUBCUTANEOUS
Refills: 0 | Status: DISCONTINUED | OUTPATIENT
Start: 2021-06-05 | End: 2021-06-08

## 2021-06-05 RX ORDER — INSULIN GLARGINE 100 [IU]/ML
13 INJECTION, SOLUTION SUBCUTANEOUS ONCE
Refills: 0 | Status: COMPLETED | OUTPATIENT
Start: 2021-06-05 | End: 2021-06-05

## 2021-06-05 RX ORDER — LOSARTAN POTASSIUM 100 MG/1
100 TABLET, FILM COATED ORAL DAILY
Refills: 0 | Status: DISCONTINUED | OUTPATIENT
Start: 2021-06-05 | End: 2021-06-08

## 2021-06-05 RX ORDER — HYDROMORPHONE HYDROCHLORIDE 2 MG/ML
4 INJECTION INTRAMUSCULAR; INTRAVENOUS; SUBCUTANEOUS EVERY 6 HOURS
Refills: 0 | Status: DISCONTINUED | OUTPATIENT
Start: 2021-06-05 | End: 2021-06-07

## 2021-06-05 RX ORDER — METFORMIN HYDROCHLORIDE 850 MG/1
1 TABLET ORAL
Qty: 0 | Refills: 0 | DISCHARGE

## 2021-06-05 RX ADMIN — CEFEPIME 100 MILLIGRAM(S): 1 INJECTION, POWDER, FOR SOLUTION INTRAMUSCULAR; INTRAVENOUS at 02:03

## 2021-06-05 RX ADMIN — Medication 30 MILLIGRAM(S): at 18:29

## 2021-06-05 RX ADMIN — Medication 4 UNIT(S): at 12:05

## 2021-06-05 RX ADMIN — Medication 30 MILLIGRAM(S): at 08:07

## 2021-06-05 RX ADMIN — HYDROMORPHONE HYDROCHLORIDE 4 MILLIGRAM(S): 2 INJECTION INTRAMUSCULAR; INTRAVENOUS; SUBCUTANEOUS at 22:45

## 2021-06-05 RX ADMIN — Medication 1: at 16:59

## 2021-06-05 RX ADMIN — Medication 250 MILLIGRAM(S): at 06:31

## 2021-06-05 RX ADMIN — Medication 30 MILLIGRAM(S): at 14:21

## 2021-06-05 RX ADMIN — Medication 1: at 22:10

## 2021-06-05 RX ADMIN — Medication 30 MILLIGRAM(S): at 18:44

## 2021-06-05 RX ADMIN — HYDROMORPHONE HYDROCHLORIDE 1 MILLIGRAM(S): 2 INJECTION INTRAMUSCULAR; INTRAVENOUS; SUBCUTANEOUS at 12:26

## 2021-06-05 RX ADMIN — Medication 4 UNIT(S): at 16:59

## 2021-06-05 RX ADMIN — Medication 150 MILLIGRAM(S): at 18:29

## 2021-06-05 RX ADMIN — INSULIN GLARGINE 12 UNIT(S): 100 INJECTION, SOLUTION SUBCUTANEOUS at 22:11

## 2021-06-05 RX ADMIN — Medication 4 UNIT(S): at 08:02

## 2021-06-05 RX ADMIN — Medication 100 MILLIGRAM(S): at 06:31

## 2021-06-05 RX ADMIN — INSULIN GLARGINE 13 UNIT(S): 100 INJECTION, SOLUTION SUBCUTANEOUS at 02:03

## 2021-06-05 RX ADMIN — Medication 2: at 08:02

## 2021-06-05 RX ADMIN — Medication 650 MILLIGRAM(S): at 18:29

## 2021-06-05 RX ADMIN — Medication 30 MILLIGRAM(S): at 23:50

## 2021-06-05 RX ADMIN — LOSARTAN POTASSIUM 100 MILLIGRAM(S): 100 TABLET, FILM COATED ORAL at 18:29

## 2021-06-05 RX ADMIN — HYDROMORPHONE HYDROCHLORIDE 4 MILLIGRAM(S): 2 INJECTION INTRAMUSCULAR; INTRAVENOUS; SUBCUTANEOUS at 22:09

## 2021-06-05 RX ADMIN — Medication 30 MILLIGRAM(S): at 02:04

## 2021-06-05 RX ADMIN — Medication 1: at 12:04

## 2021-06-05 NOTE — H&P ADULT - PROBLEM SELECTOR PLAN 1
s/p surgery on 5/7 and nerve injection on 5/19, now with worsening pain  XR right hand/wrist unremarkable, F/U MRI right hand, monitor off Abx per ID recs  Appreciate pain medicine recs:  DC Gabapentin, order Lyrica 150mg q 12 hours x 5 days.    po Dilaudid 2 mg every 6 hours PRN for moderate pain, PO Dilaudid 4 mg every 6 hours PRN for severe pain, IV Dilaudid 0.5 mg every 12 hours PRN for severe breakthrough pain.   PO Tylenol 650 mg every 6 hours standing x2 days, IV Toradol 30 mg every 6 hours x 4-6 doses, then order po Motrin 400 mg every 6 hours standing x2 days, then PRN for pain.   Occupational Therapy consult for right hand strengthening exercises, holistic therapy s/p surgery on 5/7 and nerve injection on 5/19, now with worsening pain  XR right hand/wrist unremarkable, F/U MRI right hand, monitor off Abx per ID recs  Consider neuro eval as outpatient  Appreciate pain medicine recs:  DC Gabapentin, order Lyrica 150mg q 12 hours x 5 days.    po Dilaudid 2 mg every 6 hours PRN for moderate pain, PO Dilaudid 4 mg every 6 hours PRN for severe pain, IV Dilaudid 0.5 mg every 12 hours PRN for severe breakthrough pain.   PO Tylenol 650 mg every 6 hours standing x2 days, IV Toradol 30 mg every 6 hours x 4-6 doses, then order po Motrin 400 mg every 6 hours standing x2 days, then PRN for pain.   Occupational Therapy consult for right hand strengthening exercises, holistic therapy

## 2021-06-05 NOTE — H&P ADULT - NSHPSOCIALHISTORY_GEN_ALL_CORE
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

## 2021-06-05 NOTE — CONSULT NOTE ADULT - SUBJECTIVE AND OBJECTIVE BOX
Patient is a 56y old  Male who presents with a chief complaint of R hand pain  HPI: 57 y/o male with a hx of DM, HTN presents to the ER c/o right hand pain since 5/7 s/p surgery to right hand for Dupuytren's contracture at [facility].  Pt states he returned back to his hand surgery on 5/19 to have sutures removed and states the surgeon stuck a nerve and since then his pain has increased and reports decreased ROM.  Pt was admitted at [facility] on 5/21 for IV antibiotics.  Pt denies fevers, chills, discharge, weakness, numbness, tingling.  Pt was written for splints by his Hand Surgeon but states he has not started to wear them yet.     prior hospital charts reviewed [  ]  primary team notes reviewed [  ]  other consultant notes reviewed [  ]    PAST MEDICAL & SURGICAL HISTORY:  Diabetes mellitus  type II    Hyperuricemia    Dyslipidemia    Anal fistula    Sprain, bicep    Hx of appendectomy  1987    Anal fistula  Ananl fistula repair 2013    Tendon tear, ankle, left, sequela  2016      Allergies  Vasotec (Hives)    ANTIMICROBIALS (past 90 days)  MEDICATIONS  (STANDING):  cefepime   IVPB   100 mL/Hr IV Intermittent (06-05-21 @ 02:03)    cefepime   IVPB   100 mL/Hr IV Intermittent (06-04-21 @ 17:34)    vancomycin  IVPB   250 mL/Hr IV Intermittent (06-05-21 @ 06:31)    vancomycin  IVPB.   250 mL/Hr IV Intermittent (06-04-21 @ 18:30)      ANTIMICROBIALS:    cefepime   IVPB 1000 every 8 hours  vancomycin  IVPB 1000 <User Schedule>    OTHER MEDS: MEDICATIONS  (STANDING):  dextrose 40% Gel 15 once  dextrose 50% Injectable 25 once  dextrose 50% Injectable 12.5 once  dextrose 50% Injectable 25 once  glucagon  Injectable 1 once  insulin lispro (ADMELOG) corrective regimen sliding scale  three times a day before meals  insulin lispro (ADMELOG) corrective regimen sliding scale  at bedtime  insulin lispro Injectable (ADMELOG) 4 three times a day before meals  ketorolac   Injectable 30 every 6 hours  metoprolol succinate  daily  oxycodone    5 mG/acetaminophen 325 mG 1 every 4 hours PRN    SOCIAL HISTORY:       FAMILY HISTORY:  Family history of hypertension (Mother)    Family history of diabetic complications (Mother)    Family history of hyperglycemia (Mother)    Family history of hypertension (Mother)      REVIEW OF SYSTEMS  [  ] ROS unobtainable because:    [  ] All other systems negative except as noted below:	    Constitutional:  [ ] fever [ ] chills  [ ] weight loss  [ ] weakness  Skin:  [ ] rash [ ] phlebitis	  Eyes: [ ] icterus [ ] pain  [ ] discharge	  ENMT: [ ] sore throat  [ ] thrush [ ] ulcers [ ] exudates  Respiratory: [ ] dyspnea [ ] hemoptysis [ ] cough [ ] sputum	  Cardiovascular:  [ ] chest pain [ ] palpitations [ ] edema	  Gastrointestinal:  [ ] nausea [ ] vomiting [ ] diarrhea [ ] constipation [ ] pain	  Genitourinary:  [ ] dysuria [ ] frequency [ ] hematuria [ ] discharge [ ] flank pain  [ ] incontinence  Musculoskeletal:  [ ] myalgias [ ] arthralgias [ ] arthritis  [ ] back pain  Neurological:  [ ] headache [ ] seizures  [ ] confusion/altered mental status  Psychiatric:  [ ] anxiety [ ] depression	  Hematology/Lymphatics:  [ ] lymphadenopathy  Endocrine:  [ ] adrenal [ ] thyroid  Allergic/Immunologic:	 [ ] transplant [ ] seasonal    Vital Signs Last 24 Hrs  T(F): 98.5 (06-05-21 @ 05:55), Max: 98.9 (06-04-21 @ 14:41)  Vital Signs Last 24 Hrs  HR: 80 (06-05-21 @ 05:55) (72 - 105)  BP: 157/94 (06-05-21 @ 05:55) (146/80 - 174/91)  RR: 18 (06-05-21 @ 05:55)  SpO2: 100% (06-05-21 @ 05:55) (99% - 100%)  Wt(kg): --    EXAM:  Constitutional: Not in acute distress  Eyes: pupils bilaterally reactive to light. No icterus.  Oral cavity: Clear, no lesions  Neck: No neck vein distension noted  RS: Chest clear to auscultation bilaterally. No wheeze/rhonchi/crepitations.  CVS: S1, S2 heard. Regular rate and rhythm. No murmurs/rubs/gallops.  Abdomen: Soft. No guarding/rigidity/tenderness.  : No acute abnormalities  Extremities: Warm. No pedal edema  Skin: No lesions noted  Vascular: No evidence of phlebitis  Neuro: Alert, oriented to time/place/person                          12.0   8.67  )-----------( 569      ( 05 Jun 2021 06:57 )             36.3     06-05    136  |  99  |  17  ----------------------------<  195<H>  3.6   |  24  |  0.91    Ca    8.7      05 Jun 2021 06:57    TPro  7.5  /  Alb  3.9  /  TBili  0.6  /  DBili  x   /  AST  8   /  ALT  17  /  AlkPhos  74  06-04    MICROBIOLOGY:                COVID-19 PCR: [place] (06-04-21 @ 18:14)    RADIOLOGY:  imaging below personally reviewed    OTHER TESTS:   Patient is a 56y old  Male who presents with a chief complaint of R hand pain  HPI: 55 y/o male with a hx of DM, HTN presents to the ER c/o right hand pain since 5/7 s/p surgery to right hand for Dupuytren's contracture at [facility].  Pt states he returned back to his hand surgery on 5/19 to have sutures removed and states the surgeon stuck a nerve and since then his pain has increased and reports decreased ROM.  Pt was admitted at [facility] on 5/21 for IV antibiotics.  Pt denies fevers, chills, discharge, weakness, numbness, tingling.  Pt was written for splints by his Hand Surgeon but states he has not started to wear them yet.     ID consulted to r/o hand infection.  At [facility] he was treated with vanco 5/21-27 and unasyn 5/21-22. Discarged on augmentin and doxy on to finish on 5/29.     prior hospital charts reviewed [ x ]  primary team notes reviewed [ x ]  other consultant notes reviewed [  ]    PAST MEDICAL & SURGICAL HISTORY:  Diabetes mellitus  type II    Hyperuricemia    Dyslipidemia    Anal fistula    Sprain, bicep    Hx of appendectomy  1987    Anal fistula  Ananl fistula repair 2013    Tendon tear, ankle, left, sequela  2016      Allergies  Vasotec (Hives)    ANTIMICROBIALS (past 90 days)  MEDICATIONS  (STANDING):  cefepime   IVPB   100 mL/Hr IV Intermittent (06-05-21 @ 02:03)    cefepime   IVPB   100 mL/Hr IV Intermittent (06-04-21 @ 17:34)    vancomycin  IVPB   250 mL/Hr IV Intermittent (06-05-21 @ 06:31)    vancomycin  IVPB.   250 mL/Hr IV Intermittent (06-04-21 @ 18:30)      ANTIMICROBIALS:    cefepime   IVPB 1000 every 8 hours  vancomycin  IVPB 1000 <User Schedule>    OTHER MEDS: MEDICATIONS  (STANDING):  dextrose 40% Gel 15 once  dextrose 50% Injectable 25 once  dextrose 50% Injectable 12.5 once  dextrose 50% Injectable 25 once  glucagon  Injectable 1 once  insulin lispro (ADMELOG) corrective regimen sliding scale  three times a day before meals  insulin lispro (ADMELOG) corrective regimen sliding scale  at bedtime  insulin lispro Injectable (ADMELOG) 4 three times a day before meals  ketorolac   Injectable 30 every 6 hours  metoprolol succinate  daily  oxycodone    5 mG/acetaminophen 325 mG 1 every 4 hours PRN    SOCIAL HISTORY:       FAMILY HISTORY:  Family history of hypertension (Mother)    Family history of diabetic complications (Mother)    Family history of hyperglycemia (Mother)    Family history of hypertension (Mother)      REVIEW OF SYSTEMS  [  ] ROS unobtainable because:    [  ] All other systems negative except as noted below:	    Constitutional:  [ ] fever [ ] chills  [ ] weight loss  [ ] weakness  Skin:  [ ] rash [ ] phlebitis	  Eyes: [ ] icterus [ ] pain  [ ] discharge	  ENMT: [ ] sore throat  [ ] thrush [ ] ulcers [ ] exudates  Respiratory: [ ] dyspnea [ ] hemoptysis [ ] cough [ ] sputum	  Cardiovascular:  [ ] chest pain [ ] palpitations [ ] edema	  Gastrointestinal:  [ ] nausea [ ] vomiting [ ] diarrhea [ ] constipation [ ] pain	  Genitourinary:  [ ] dysuria [ ] frequency [ ] hematuria [ ] discharge [ ] flank pain  [ ] incontinence  Musculoskeletal:  [ ] myalgias [ ] arthralgias [ ] arthritis  [ ] back pain  Neurological:  [ ] headache [ ] seizures  [ ] confusion/altered mental status  Psychiatric:  [ ] anxiety [ ] depression	  Hematology/Lymphatics:  [ ] lymphadenopathy  Endocrine:  [ ] adrenal [ ] thyroid  Allergic/Immunologic:	 [ ] transplant [ ] seasonal    Vital Signs Last 24 Hrs  T(F): 98.5 (06-05-21 @ 05:55), Max: 98.9 (06-04-21 @ 14:41)  Vital Signs Last 24 Hrs  HR: 80 (06-05-21 @ 05:55) (72 - 105)  BP: 157/94 (06-05-21 @ 05:55) (146/80 - 174/91)  RR: 18 (06-05-21 @ 05:55)  SpO2: 100% (06-05-21 @ 05:55) (99% - 100%)  Wt(kg): --    EXAM:  Constitutional: Not in acute distress  Eyes: pupils bilaterally reactive to light. No icterus.  Oral cavity: Clear, no lesions  Neck: No neck vein distension noted  RS: Chest clear to auscultation bilaterally. No wheeze/rhonchi/crepitations.  CVS: S1, S2 heard. Regular rate and rhythm. No murmurs/rubs/gallops.  Abdomen: Soft. No guarding/rigidity/tenderness.  : No acute abnormalities  Extremities: Warm. No pedal edema  Skin: No lesions noted  Vascular: No evidence of phlebitis  Neuro: Alert, oriented to time/place/person                          12.0   8.67  )-----------( 469      ( 05 Jun 2021 06:57 )             36.3     06-05    136  |  99  |  17  ----------------------------<  195<H>  3.6   |  24  |  0.91    Ca    8.7      05 Jun 2021 06:57    TPro  7.5  /  Alb  3.9  /  TBili  0.6  /  DBili  x   /  AST  8   /  ALT  17  /  AlkPhos  74  06-04    ESR 44  CRP 17     MICROBIOLOGY:  Blood clx pending    COVID-19 PCR: [place] (06-04-21 @ 18:14)    RADIOLOGY:  imaging below personally reviewed    < from: Xray Wrist 3 Views, Right (06.04.21 @ 13:47) >  EXAM:  XR HAND MIN 3 VIEWS RT    EXAM:  XR WRIST COMP MIN 3 VIEWS RT    PROCEDURE DATE:  Jun 4 2021     INTERPRETATION:  CLINICAL INDICATION: right hand and wrist pain; prior surgery  EXAM:  Frontal, oblique, and lateral right hand andwrist from 6/4/2021 at 1347. Compared to recent prior right hand radiographs and CT.    IMPRESSION:  No tracking soft tissue gas collections, radiopaque foreign bodies, or gross radiographic evidence for osteomyelitis.  No fractures or dislocations.  Degenerative spurring projecting from ulnar head proximal articular margin. Otherwise preserved joint spaces and no joint margin erosions.  Carpal bones normally aligned.  Neutral ulnar variance.  No discrete lytic or blastic lesions.        OTHER TESTS:   Patient is a 56y old  Male who presents with a chief complaint of R hand pain  HPI: 57 y/o male with a hx of DM, HTN presents to the ER c/o right hand pain since 5/7 s/p surgery to right hand for Dupuytren's contracture at [facility].  Pt states he returned back to his hand surgery on 5/19 to have sutures removed and states the surgeon stuck a nerve and since then his pain has increased and reports decreased ROM.  Pt was admitted at [facility] on 5/21 for IV antibiotics.  Pt denies fevers, chills, discharge, weakness, numbness, tingling.  Pt was written for splints by his Hand Surgeon but states he has not started to wear them yet.     ID consulted to r/o hand infection.  At [facility] he was treated with vanco 5/21-27 and unasyn 5/21-22. Discarged on augmentin and doxy on to finish on 5/29.     Reports persistent pain from wrist to base of hand. No fevers but chills for 2-3 days now.  Reports good appetite. No open wounds or drainage.       prior hospital charts reviewed [ x ]  primary team notes reviewed [ x ]  other consultant notes reviewed [ x ]    PAST MEDICAL & SURGICAL HISTORY:  Diabetes mellitus  type II    Hyperuricemia    Dyslipidemia    Anal fistula    Sprain, bicep    Hx of appendectomy  1987    Anal fistula  Ananl fistula repair 2013    Tendon tear, ankle, left, sequela  2016      Allergies  Vasotec (Hives)    ANTIMICROBIALS (past 90 days)  MEDICATIONS  (STANDING):  cefepime   IVPB   100 mL/Hr IV Intermittent (06-05-21 @ 02:03)    cefepime   IVPB   100 mL/Hr IV Intermittent (06-04-21 @ 17:34)    vancomycin  IVPB   250 mL/Hr IV Intermittent (06-05-21 @ 06:31)    vancomycin  IVPB.   250 mL/Hr IV Intermittent (06-04-21 @ 18:30)      ANTIMICROBIALS:    cefepime   IVPB 1000 every 8 hours  vancomycin  IVPB 1000 <User Schedule>    OTHER MEDS: MEDICATIONS  (STANDING):  dextrose 40% Gel 15 once  dextrose 50% Injectable 25 once  dextrose 50% Injectable 12.5 once  dextrose 50% Injectable 25 once  glucagon  Injectable 1 once  insulin lispro (ADMELOG) corrective regimen sliding scale  three times a day before meals  insulin lispro (ADMELOG) corrective regimen sliding scale  at bedtime  insulin lispro Injectable (ADMELOG) 4 three times a day before meals  ketorolac   Injectable 30 every 6 hours  metoprolol succinate  daily  oxycodone    5 mG/acetaminophen 325 mG 1 every 4 hours PRN    SOCIAL HISTORY:       FAMILY HISTORY:  Family history of hypertension (Mother)    Family history of diabetic complications (Mother)    Family history of hyperglycemia (Mother)    Family history of hypertension (Mother)      REVIEW OF SYSTEMS  [  ] ROS unobtainable because:    [ x ] All other systems negative except as noted below:	    Constitutional:  [ ] fever [x ] chills  [ ] weight loss  [ ] weakness  Skin:  [ ] rash [ ] phlebitis	  Eyes: [ ] icterus [ ] pain  [ ] discharge	  ENMT: [ ] sore throat  [ ] thrush [ ] ulcers [ ] exudates  Respiratory: [ ] dyspnea [ ] hemoptysis [ ] cough [ ] sputum	  Cardiovascular:  [ ] chest pain [ ] palpitations [ ] edema	  Gastrointestinal:  [ ] nausea [ ] vomiting [ ] diarrhea [ ] constipation [ ] pain	  Genitourinary:  [ ] dysuria [ ] frequency [ ] hematuria [ ] discharge [ ] flank pain  [ ] incontinence  Musculoskeletal:  [ ] myalgias [ ] arthralgias [ ] arthritis  [ ] back pain  Neurological:  [ ] headache [ ] seizures  [ ] confusion/altered mental status  Psychiatric:  [ ] anxiety [ ] depression	  Hematology/Lymphatics:  [ ] lymphadenopathy  Endocrine:  [ ] adrenal [ ] thyroid  Allergic/Immunologic:	 [ ] transplant [ ] seasonal    Vital Signs Last 24 Hrs  T(F): 98.5 (06-05-21 @ 05:55), Max: 98.9 (06-04-21 @ 14:41)  Vital Signs Last 24 Hrs  HR: 80 (06-05-21 @ 05:55) (72 - 105)  BP: 157/94 (06-05-21 @ 05:55) (146/80 - 174/91)  RR: 18 (06-05-21 @ 05:55)  SpO2: 100% (06-05-21 @ 05:55) (99% - 100%)  Wt(kg): --    EXAM:  Constitutional: Not in acute distress  Eyes: pupils bilaterally reactive to light. No icterus.  Oral cavity: Clear, no lesions  Neck: No neck vein distension noted  RS: Chest clear to auscultation bilaterally. No wheeze/rhonchi/crepitations.  CVS: S1, S2 heard. Regular rate and rhythm. No murmurs/rubs/gallops.  Abdomen: Soft. No guarding/rigidity/tenderness.  : No acute abnormalities  Extremities: R hand with diffuse swelling. No focal tenderness or area of induration, Tenderness at ventral aspect of wrist  Skin: No lesions noted  Vascular: No evidence of phlebitis  Neuro: Alert, oriented to time/place/person                          12.0   8.67  )-----------( 469      ( 05 Jun 2021 06:57 )             36.3     06-05    136  |  99  |  17  ----------------------------<  195<H>  3.6   |  24  |  0.91    Ca    8.7      05 Jun 2021 06:57    TPro  7.5  /  Alb  3.9  /  TBili  0.6  /  DBili  x   /  AST  8   /  ALT  17  /  AlkPhos  74  06-04    ESR 44  CRP 17     MICROBIOLOGY:  Blood clx pending    COVID-19 PCR: [place] (06-04-21 @ 18:14)    RADIOLOGY:  imaging below personally reviewed    < from: Xray Wrist 3 Views, Right (06.04.21 @ 13:47) >  EXAM:  XR HAND MIN 3 VIEWS RT    EXAM:  XR WRIST COMP MIN 3 VIEWS RT    PROCEDURE DATE:  Jun 4 2021     INTERPRETATION:  CLINICAL INDICATION: right hand and wrist pain; prior surgery  EXAM:  Frontal, oblique, and lateral right hand andwrist from 6/4/2021 at 1347. Compared to recent prior right hand radiographs and CT.    IMPRESSION:  No tracking soft tissue gas collections, radiopaque foreign bodies, or gross radiographic evidence for osteomyelitis.  No fractures or dislocations.  Degenerative spurring projecting from ulnar head proximal articular margin. Otherwise preserved joint spaces and no joint margin erosions.  Carpal bones normally aligned.  Neutral ulnar variance.  No discrete lytic or blastic lesions.

## 2021-06-05 NOTE — H&P ADULT - HISTORY OF PRESENT ILLNESS
56 yr old right handed man PMH DM Type 2 recently started on insulin (has not started using yet), HTN p/w right hand pain since 5/7 s/p surgery to right hand for Dupuytren's contracture at [facility].  Pt states he returned back to his hand surgery on 5/19 to have sutures removed and states the surgeon struck a nerve and since then his pain has increased and reports decreased ROM and weakness in right hand.  States the pain starts at base of right thumb and travels to all fingers and he is unable to make a fist with right hand. Describes the pain as an electric shock. Pain is presently rated 5/10. Pt was recently admitted at [facility] on 5/21 for IV antibiotics for right hand cellulitis and was given augmentin and doxycycline. Denies fevers but reports chills. Denies chest pain, SOB, N/V/D, numbness/tingling. Pt was written for splints by his Hand Surgeon but states he has not started to wear them yet.

## 2021-06-05 NOTE — ED CDU PROVIDER SUBSEQUENT DAY NOTE - MEDICAL DECISION MAKING DETAILS
IV antibiotics, supportive care, Hand and Infectious Disease consultations, general observation care / monitoring; diabetic education.

## 2021-06-05 NOTE — H&P ADULT - NSICDXFAMILYHX_GEN_ALL_CORE_FT
FAMILY HISTORY:  Mother  Still living? Unknown  Family history of diabetic complications, Age at diagnosis: Age Unknown  Family history of hyperglycemia, Age at diagnosis: Age Unknown  Family history of hypertension, Age at diagnosis: Age Unknown  Family history of hypertension, Age at diagnosis: Age Unknown

## 2021-06-05 NOTE — H&P ADULT - NSHPPHYSICALEXAM_GEN_ALL_CORE
Vital Signs Last 24 Hrs  T(C): 36.7 (05 Jun 2021 16:51), Max: 37.2 (05 Jun 2021 02:00)  T(F): 98 (05 Jun 2021 16:51), Max: 98.9 (05 Jun 2021 02:00)  HR: 78 (05 Jun 2021 16:51) (72 - 105)  BP: 150/89 (05 Jun 2021 16:51) (135/88 - 171/97)  BP(mean): --  RR: 18 (05 Jun 2021 16:51) (15 - 18)  SpO2: 98% (05 Jun 2021 16:51) (98% - 100%)    PHYSICAL EXAM:  GENERAL: no apparent distress, on room air  HEENT: sclera clear, eyes tracking, PERRL  CHEST/LUNG: Clear to auscultation bilaterally; No wheezing or crackles  HEART: Regular rate and rhythm; No murmurs, rubs, or gallops  ABDOMEN: Soft, Nontender, Nondistended; Bowel sounds present  EXTREMITIES:  2+ Peripheral Pulses, No clubbing, cyanosis, or edema  PSYCH: normal affect, calm demeanor  NEUROLOGY: AAOX3, unable to make fist with right hand, no sensory deficits, hypertrophied scar in right palm,  mild right hand swelling. tenderness at ventral aspect of wrist  VASCULAR: B/L radial pulses intact  SKIN: No rashes or lesions

## 2021-06-05 NOTE — H&P ADULT - ASSESSMENT
56 yr old right handed man PMH DM Type 2 recently started on insulin (has not started using yet), HTN p/w right hand pain since 5/7 s/p surgery to right hand for Dupuytren's contracture at [facility]. Admitted for pain control

## 2021-06-05 NOTE — ED CDU PROVIDER SUBSEQUENT DAY NOTE - ATTENDING CONTRIBUTION TO CARE
I have made the decision to admit this patient to the CDU as documented in my Provider note I have reviewed the note  written by Lianne Lopez [facility], on that visit day. I have supervised and participated as necessary in the performance of procedures indicated for patient management and was available at all phases of the patient´s visit when needed.    Please see the provider note for the details of the decision to admit  Vital Signs Last 24 Hrs  T(F): 97.7 HR: 76 BP: 135/88 RR: 18 SpO2: 100% (05 Jun 2021 10:30) (99% - 100%)  PE unchanged at time of admission  Consults read and appreciated; Patient evaluated by Pain management @12:46; recc inpt evaluation by neuro OT and pain management (increase gabapentin and PO narcs only)

## 2021-06-05 NOTE — ED CDU PROVIDER SUBSEQUENT DAY NOTE - PROGRESS NOTE DETAILS
PT seen by Dr. Morales hand, no suspicion for infection, states she did speak with the hand surgeon at  who saw the pt during the last admission, who confirmed pt did in fact have post op cellulitis. No signs or sx of cellulitis now, Dr. Henry recommending possible pain mgmt c/s, pt already has an rx for outpt PT. Pt also seen by ID, state low suspicion for infx as well, can hold off on abx, however would like to proceed with MRI. Call placed to pain mgmt service, awaiting callback.

## 2021-06-05 NOTE — H&P ADULT - PROBLEM SELECTOR PLAN 2
A1C: 11.0, states he was started on insulin recently at most recent hospitalization, hold metformin  Continue lantus 12 U qhs, premeal admelog 2 U TID, SSI premeals  consistent carb diet, registered dietician consult  States he was taught insulin use previously, he is also a pharmacist  c/w losartan  Will need endocrine appt set up on DC A1C: 11.0, states he was started on insulin recently at most recent hospitalization, hold metformin  FS controlled, Continue lantus 12 U qhs, premeal admelog 2 U TID, SSI premeals  consistent carb diet, registered dietician consult  States he was taught insulin use previously, he is also a pharmacist  c/w losartan  Will need endocrine appt set up on DC

## 2021-06-05 NOTE — ED CDU PROVIDER SUBSEQUENT DAY NOTE - HISTORY
55 yo male, PMH of DM (non-adherent with meds), HTN, hyperlipidemia; pt presented to the ED c/o persistent right hand pain.  Pt is s/p right hand surgery on 5/17/21 for Dupuytren contracture at [facility], and s/p inpatient hospital admission 5/21/21 - 5/27/21 at [facility] for right hand pain/immobility/swelling following suture removal, treated with IV antibiotics and discharged on PO Augmentin and doxycycline through 5/29.  Hand and ID were contacted for consult; pt was started on IV cefepime and vancomycin.  Pt. was dispo'd to CDU for MRI of the right hand, IV antibiotics, analgesia PRN, ID and Hand consultations.  In the interim, pt objectively noted to be resting comfortably.  HbA1c is 11.0; per review of recent hospital admission at [facility], pt was evaluated by Endocrine (pt reportedly non-adherent to metformin only at that time); pt was advised to start Lantus and continue metformin but pt denies that he is taking insulin at this time.  Plan for diabetic education with pt in the daytime.  No other issues thus far.

## 2021-06-05 NOTE — CONSULT NOTE ADULT - ASSESSMENT
57 y/o male with a hx of DM, HTN presents to the ER c/o right hand pain since 5/7 s/p surgery to right hand for Dupuytren's contracture at [facility].  Pt states he returned back to his hand surgery on 5/19 to have sutures removed and states the surgeon stuck a nerve and since then his pain has increased and reports decreased ROM.  Pt was admitted at [facility] on 5/21 for IV antibiotics.  Pt denies fevers, chills, discharge, weakness, numbness, tingling.  Pt was written for splints by his Hand Surgeon but states he has not started to wear them yet.     ID consulted to r/o hand infection.  At [facility] he was treated with vanco 5/21-27 and unasyn 5/21-22. Discarged on augmentin and doxy on to finish on 5/29.     57 y/o male with a hx of DM, HTN presents to the ER c/o right hand pain since 5/7 s/p surgery to right hand for Dupuytren's contracture at [facility].  Admitted at [facility] on 5/21 for post op cellulitis, received IV antibiotics and d/c'd with PO abx.  Returns due to peristant hand and wrist pain.  ID consulted to r/o hand infection.    R/o Osteomyelitis - No fevers or leukocytosis but with persistent pain in setting of recent surgery and cellulitis.   ESR and CRP are mildly elevated as well.  Recieving vanc/cefepime here.    Rec:  - agree with MRI to r/o OM  - monitor off abx as he is stable otherwise  - f/u BCx      Randell Castaneda MD  Fellow, Infectious Diseases, PGY-4  Pager: [phone]  Before 9am or after 5pm/Weekends: Call [phone]

## 2021-06-05 NOTE — ED CDU PROVIDER DISPOSITION NOTE - CLINICAL COURSE
55 y/o M hx DM non compliant w/ meds, recent surgery for Dupuytren's contracture R hand, complicated by cellulitis which was treated at [facility], here w/ R wrist pain, persistent sp suture removal from R hand operative incision. States he was given a nerve block into the wrist for the sutures to be removed and has since been experiencing wrist pain and decreased ROM of his 5th and 4th digits. Was sent to CDU for MR hand (r/o OM), hand c/s and ID consult. ID and hand both saw the patient and agree low suspicion for active infection, ID still recommending MRI. Hand recommends pain management consult, states pt has already been connected to outpatient OT. In the cdu, patient has had persistent pain requiring Dilaudid. Pain management was consulted, however pt states he does not feel comfortable going home with this type of severe pain. Pain management also recommending admission, possible neuro eval given difficulty moving the digit.

## 2021-06-05 NOTE — CONSULT NOTE ADULT - SUBJECTIVE AND OBJECTIVE BOX
Chief Complaint: right hand pain    HPI:  Patient seen at the bedside in the CDU.  Patient is lying down in bed.  Patient states his pain is 4-5/10 in his right hand.  Patient had Dupuytren contracture of his right hand, but on 5/7/21 patient had surgery.  After surgery, patient was able to open and close his fingers on his right hand.  Patient was able to use it and according to patient, it had full sensation and functionality.  Patient states he still had sutures that did not resolve in the palm of his hand and went back to the office.  The practitioner injected a numbing medication and made him feel an electric shock pain in his hand. After removal of suture, patient continued to have right hand pain and his hand became swollen.   Currently, patient has numbness in the lateral area of his right hand, a stabbing pain from the second and third digits that shoots up his right arm to his right neck.  However, the worst pain is in his inner right wrist.  This is the area where the injection was made, and it feels like someone is drilling his inner wrist.   Apparently, the pain was so intense he would scream and it brought him here to the hospital.  Patient has received some relief from the 2 doses of IV Dilaudid he received yesterday for pain and the one he received today.       PAST MEDICAL & SURGICAL HISTORY:  Diabetes mellitus  type II  Hyperuricemia  Dyslipidemia  Anal fistula  Sprain, bicep  Hx of appendectomy  1987  Anal fistula  Ananl fistula repair 2013  Tendon tear, ankle, left, sequela  2016    FAMILY HISTORY:  Family history of hypertension (Mother)  Family history of diabetic complications (Mother)  Family history of hyperglycemia (Mother)  Family history of hypertension (Mother)    SOCIAL HISTORY:  [x ] Denies Smoking, or Drug Use;  Drinks alcohol on occasion    Allergies  Vasotec (Hives)    PAIN MEDICATIONS:  oxycodone    5 mG/acetaminophen 325 mG 1 Tablet(s) Oral every 4 hours PRN    Cardiovascular:  metoprolol succinate  milliGRAM(s) Oral daily    Endocrine:  dextrose 40% Gel 15 Gram(s) Oral once  dextrose 50% Injectable 25 Gram(s) IV Push once  dextrose 50% Injectable 12.5 Gram(s) IV Push once  dextrose 50% Injectable 25 Gram(s) IV Push once  glucagon  Injectable 1 milliGRAM(s) IntraMuscular once  insulin lispro (ADMELOG) corrective regimen sliding scale   SubCutaneous three times a day before meals  insulin lispro (ADMELOG) corrective regimen sliding scale   SubCutaneous at bedtime  insulin lispro Injectable (ADMELOG) 4 Unit(s) SubCutaneous three times a day before meals    All Other Medications:  dextrose 5%. 1000 milliLiter(s) IV Continuous <Continuous>  dextrose 5%. 1000 milliLiter(s) IV Continuous <Continuous>      Vital Signs Last 24 Hrs  T(C): 36.7 (05 Jun 2021 15:30), Max: 37.2 (05 Jun 2021 02:00)  T(F): 98 (05 Jun 2021 15:30), Max: 98.9 (05 Jun 2021 02:00)  HR: 81 (05 Jun 2021 15:30) (72 - 105)  BP: 141/73 (05 Jun 2021 15:30) (135/88 - 171/97)  BP(mean): --  RR: 16 (05 Jun 2021 15:30) (15 - 18)  SpO2: 100% (05 Jun 2021 10:30) (99% - 100%)    PAIN SCORE:   4-5/10      SCALE USED: (1-10 VNRS)           LABS:                          12.0   8.67  )-----------( 469      ( 05 Jun 2021 06:57 )             36.3     06-05    136  |  99  |  17  ----------------------------<  195<H>  3.6   |  24  |  0.91    Ca    8.7      05 Jun 2021 06:57    TPro  7.5  /  Alb  3.9  /  TBili  0.6  /  DBili  x   /  AST  8   /  ALT  17  /  AlkPhos  74  06-04    [ x]  [place]  Reviewed: no medications found    PHYSICAL EXAM:  GENERAL: Alert & Oriented x 3 in NAD, well-groomed, well-developed     Impression/Plan: Requested by ED team to help manage pain.   Recommendations:  -  Consider discontinuing Gabapentin, and instead order Lyrica 150mg q 12 hours x 5 days.  Hold for oversedation.  -  Consider discontinuing Percocet, and instead order:   po Dilaudid 2 mg every 6 hours PRN for moderate pain.  Hold for oversedation.  Not to be given within 1 hour of any other immediate acting opioid.   po Dilaudid 4 mg every 6 hours PRN for severe pain.  Hold for oversedation.  Not to be given within 1 hour of any other immediate acting opioid.   IV Dilaudid 0.5 mg every 12 hours PRN for severe breakthrough pain.  Hold for oversedation.  Not to be given within 1 hour of any other immediate acting opioid.  -  Consider ordering po Tylenol 650 mg every 6 hours standing x2 days, then PRN for pain.  Please alternate every 3 hours with NSAID.   -  Consider ordering IV Toradol 30 mg every 6 hours x 4-6 doses, then order po Motrin 400 mg every 6 hours standing x2 days, then PRN for pain. Please alternate every 3 hours with po Tylenol.  -  Recommend maintaining continuous pulse oximetry.  -  Recommend Occupational Therapy consult for right hand strengthening exercises.  -  Recommend Holistic RN for complementary and alternative therapies for pain, using a mid-body-spirit-emotion approach.  -  Recommend Neurology consult for right hand neuropathy, might suggest possible imaging.  -  Recommend follow up with Chronic Pain doctor when discharged. If patient does not have a Chronic Pain doctor, may acquire one through patient's personal insurance carrier.  Discussed patient with Chronic Pain Attending on call, Dr. ELIZABETH Katz, who agrees with the above recommendations.  No further recommendations at this time, Chronic pain service to sign off. May call Chronic Pain Service if needed.   Thank you.

## 2021-06-05 NOTE — ED CDU PROVIDER DISPOSITION NOTE - NS ED MD DISPO ISOLATION TYPES
[place] [address]
Phone:  [phone]                     ELECTROCARDIOGRAM REPORT      
_______________________________________________________________________________
 
Name:       WOJCIECH MCKINLEY                 Room:                      [facility]#:  [id number]      Account #:      [id number]  
Admission:  19     Attend Phys:                         
Discharge:  19     Date of Birth:  [birth year]  
         Report #: [id number]
    [id number]
_______________________________________________________________________________
THIS REPORT FOR:  //name//                      
 
                         [facility] ED
                                       
Test Date:    2019               Test Time:    19:51:38
Pat Name:     WOJCIECH AUE             Department:   
Patient ID:   [id number]            Room:          
Gender:       F                        Technician:   ELIZABETH SOTOMAYOR
:          1943               Requested By: Alisa Ron
Order Number: [id number] MD:   Zi Duran
                                 Measurements
Intervals                              Axis          
Rate:         90                       P:            47
NM:           169                      QRS:          65
QRSD:         95                       T:            59
QT:           356                                    
QTc:          436                                    
                           Interpretive Statements
Sinus rhythm
Borderline low voltage, extremity leads
Abnormal T, consider ischemia, lateral leads
Minimal ST elevation, inferior leads
Baseline wander in lead(s) V1,V2,V5
No previous ECG available for comparison
 
Electronically Signed On 2019 12:27:24 CDT by Zi Duran
[email or web address]
 
 
 
 
 
 
 
 
 
 
 
 
 
 
 
 
  <ELECTRONICALLY SIGNED>
                                           By: Zi Duran MD, FACC   
  19     1227
D: 19   _____________________________________
T: 19   Zi Duran MD, FAC     /EPI None

## 2021-06-05 NOTE — ED CDU PROVIDER DISPOSITION NOTE - ATTENDING CONTRIBUTION TO CARE
I have made the decision to discharge this patient to the CDU as documented in my Provider note I have reviewed the note  written by Lianne Lopez [facility], on that visit day. I have supervised and participated as necessary in the performance of procedures indicated for patient management and was available at all phases of the patient´s visit when needed.    Patient TBA as per reccs of pain management as per ability to control pain and patients ability to comply with plan  ID agrees that infection is not likely etiology of pain  more likely neuropathic  TBA

## 2021-06-05 NOTE — ED CDU PROVIDER SUBSEQUENT DAY NOTE - PHYSICAL EXAMINATION
CONSTITUTIONAL:  Well appearing, awake, alert, oriented to person, place, time/situation and in no apparent distress.  Pt. is objectively comfortable appearing and verbalizing in full, clear, effortless sentences.  ENMT: NC/AT.  Airway patent.  Nasal mucosa clear.  Moist mucous membranes.  Neck supple.  EYES:  Clear OU.  CARDIAC:  Normal rate, regular rhythm.  Heart sounds S1 S2.  No murmurs, gallops, or rubs.  RESPIRATORY:  Breath sounds clear and equal bilaterally.  No wheezes, no rales, no rhonchi.  MUSCULOSKELETAL:  Range of motion is slightly limited to right hand.  Hypertrophic hyperpigmented skin to mid palmar region noted; no erythema noted.  +NVI.  No crepitus.  SKIN:  Skin color unremarkable.  Skin warm, dry, and intact.    PSYCHIATRIC:  Alert and oriented to person/place/time/situation.  Mood and affect WNL.  No apparent risk to self or others.

## 2021-06-06 LAB
ALBUMIN SERPL ELPH-MCNC: 3.7 G/DL — SIGNIFICANT CHANGE UP (ref 3.3–5)
ALP SERPL-CCNC: 68 U/L — SIGNIFICANT CHANGE UP (ref 40–120)
ALT FLD-CCNC: 12 U/L — SIGNIFICANT CHANGE UP (ref 4–41)
ANION GAP SERPL CALC-SCNC: 13 MMOL/L — SIGNIFICANT CHANGE UP (ref 7–14)
AST SERPL-CCNC: 8 U/L — SIGNIFICANT CHANGE UP (ref 4–40)
BASOPHILS # BLD AUTO: 0.05 K/UL — SIGNIFICANT CHANGE UP (ref 0–0.2)
BASOPHILS NFR BLD AUTO: 0.7 % — SIGNIFICANT CHANGE UP (ref 0–2)
BILIRUB SERPL-MCNC: 0.4 MG/DL — SIGNIFICANT CHANGE UP (ref 0.2–1.2)
BUN SERPL-MCNC: 18 MG/DL — SIGNIFICANT CHANGE UP (ref 7–23)
CALCIUM SERPL-MCNC: 9 MG/DL — SIGNIFICANT CHANGE UP (ref 8.4–10.5)
CHLORIDE SERPL-SCNC: 99 MMOL/L — SIGNIFICANT CHANGE UP (ref 98–107)
CO2 SERPL-SCNC: 25 MMOL/L — SIGNIFICANT CHANGE UP (ref 22–31)
COVID-19 SPIKE DOMAIN AB INTERP: POSITIVE
COVID-19 SPIKE DOMAIN ANTIBODY RESULT: >250 U/ML — HIGH
CREAT SERPL-MCNC: 0.81 MG/DL — SIGNIFICANT CHANGE UP (ref 0.5–1.3)
EOSINOPHIL # BLD AUTO: 0.06 K/UL — SIGNIFICANT CHANGE UP (ref 0–0.5)
EOSINOPHIL NFR BLD AUTO: 0.9 % — SIGNIFICANT CHANGE UP (ref 0–6)
GLUCOSE BLDC GLUCOMTR-MCNC: 159 MG/DL — HIGH (ref 70–99)
GLUCOSE BLDC GLUCOMTR-MCNC: 168 MG/DL — HIGH (ref 70–99)
GLUCOSE BLDC GLUCOMTR-MCNC: 169 MG/DL — HIGH (ref 70–99)
GLUCOSE BLDC GLUCOMTR-MCNC: 183 MG/DL — HIGH (ref 70–99)
GLUCOSE SERPL-MCNC: 160 MG/DL — HIGH (ref 70–99)
HCT VFR BLD CALC: 40.6 % — SIGNIFICANT CHANGE UP (ref 39–50)
HGB BLD-MCNC: 13 G/DL — SIGNIFICANT CHANGE UP (ref 13–17)
IANC: 3.79 K/UL — SIGNIFICANT CHANGE UP (ref 1.5–8.5)
IMM GRANULOCYTES NFR BLD AUTO: 0.4 % — SIGNIFICANT CHANGE UP (ref 0–1.5)
LYMPHOCYTES # BLD AUTO: 2.29 K/UL — SIGNIFICANT CHANGE UP (ref 1–3.3)
LYMPHOCYTES # BLD AUTO: 33.4 % — SIGNIFICANT CHANGE UP (ref 13–44)
MAGNESIUM SERPL-MCNC: 1.8 MG/DL — SIGNIFICANT CHANGE UP (ref 1.6–2.6)
MCHC RBC-ENTMCNC: 26.7 PG — LOW (ref 27–34)
MCHC RBC-ENTMCNC: 32 GM/DL — SIGNIFICANT CHANGE UP (ref 32–36)
MCV RBC AUTO: 83.5 FL — SIGNIFICANT CHANGE UP (ref 80–100)
MONOCYTES # BLD AUTO: 0.63 K/UL — SIGNIFICANT CHANGE UP (ref 0–0.9)
MONOCYTES NFR BLD AUTO: 9.2 % — SIGNIFICANT CHANGE UP (ref 2–14)
NEUTROPHILS # BLD AUTO: 3.79 K/UL — SIGNIFICANT CHANGE UP (ref 1.8–7.4)
NEUTROPHILS NFR BLD AUTO: 55.4 % — SIGNIFICANT CHANGE UP (ref 43–77)
NRBC # BLD: 0 /100 WBCS — SIGNIFICANT CHANGE UP
NRBC # FLD: 0 K/UL — SIGNIFICANT CHANGE UP
PHOSPHATE SERPL-MCNC: 3.2 MG/DL — SIGNIFICANT CHANGE UP (ref 2.5–4.5)
PLATELET # BLD AUTO: 510 K/UL — HIGH (ref 150–400)
POTASSIUM SERPL-MCNC: 3.1 MMOL/L — LOW (ref 3.5–5.3)
POTASSIUM SERPL-SCNC: 3.1 MMOL/L — LOW (ref 3.5–5.3)
PROT SERPL-MCNC: 7.5 G/DL — SIGNIFICANT CHANGE UP (ref 6–8.3)
RBC # BLD: 4.86 M/UL — SIGNIFICANT CHANGE UP (ref 4.2–5.8)
RBC # FLD: 11.9 % — SIGNIFICANT CHANGE UP (ref 10.3–14.5)
SARS-COV-2 IGG+IGM SERPL QL IA: >250 U/ML — HIGH
SARS-COV-2 IGG+IGM SERPL QL IA: POSITIVE
SODIUM SERPL-SCNC: 137 MMOL/L — SIGNIFICANT CHANGE UP (ref 135–145)
WBC # BLD: 6.85 K/UL — SIGNIFICANT CHANGE UP (ref 3.8–10.5)
WBC # FLD AUTO: 6.85 K/UL — SIGNIFICANT CHANGE UP (ref 3.8–10.5)

## 2021-06-06 PROCEDURE — 99232 SBSQ HOSP IP/OBS MODERATE 35: CPT

## 2021-06-06 PROCEDURE — 73220 MRI UPPR EXTREMITY W/O&W/DYE: CPT | Mod: 26,RT

## 2021-06-06 RX ORDER — CYCLOBENZAPRINE HYDROCHLORIDE 10 MG/1
5 TABLET, FILM COATED ORAL ONCE
Refills: 0 | Status: COMPLETED | OUTPATIENT
Start: 2021-06-06 | End: 2021-06-06

## 2021-06-06 RX ORDER — POTASSIUM CHLORIDE 20 MEQ
40 PACKET (EA) ORAL ONCE
Refills: 0 | Status: COMPLETED | OUTPATIENT
Start: 2021-06-06 | End: 2021-06-06

## 2021-06-06 RX ADMIN — Medication 150 MILLIGRAM(S): at 17:27

## 2021-06-06 RX ADMIN — Medication 30 MILLIGRAM(S): at 23:33

## 2021-06-06 RX ADMIN — LOSARTAN POTASSIUM 100 MILLIGRAM(S): 100 TABLET, FILM COATED ORAL at 05:24

## 2021-06-06 RX ADMIN — Medication 30 MILLIGRAM(S): at 17:27

## 2021-06-06 RX ADMIN — Medication 150 MILLIGRAM(S): at 05:23

## 2021-06-06 RX ADMIN — HYDROMORPHONE HYDROCHLORIDE 4 MILLIGRAM(S): 2 INJECTION INTRAMUSCULAR; INTRAVENOUS; SUBCUTANEOUS at 01:24

## 2021-06-06 RX ADMIN — HYDROMORPHONE HYDROCHLORIDE 4 MILLIGRAM(S): 2 INJECTION INTRAMUSCULAR; INTRAVENOUS; SUBCUTANEOUS at 05:22

## 2021-06-06 RX ADMIN — Medication 1: at 07:31

## 2021-06-06 RX ADMIN — Medication 1: at 11:59

## 2021-06-06 RX ADMIN — Medication 1: at 16:43

## 2021-06-06 RX ADMIN — Medication 2 UNIT(S): at 07:42

## 2021-06-06 RX ADMIN — CYCLOBENZAPRINE HYDROCHLORIDE 5 MILLIGRAM(S): 10 TABLET, FILM COATED ORAL at 23:34

## 2021-06-06 RX ADMIN — Medication 40 MILLIEQUIVALENT(S): at 09:38

## 2021-06-06 RX ADMIN — Medication 30 MILLIGRAM(S): at 05:24

## 2021-06-06 RX ADMIN — HYDROMORPHONE HYDROCHLORIDE 4 MILLIGRAM(S): 2 INJECTION INTRAMUSCULAR; INTRAVENOUS; SUBCUTANEOUS at 12:08

## 2021-06-06 RX ADMIN — Medication 30 MILLIGRAM(S): at 06:13

## 2021-06-06 RX ADMIN — CYCLOBENZAPRINE HYDROCHLORIDE 5 MILLIGRAM(S): 10 TABLET, FILM COATED ORAL at 14:32

## 2021-06-06 RX ADMIN — Medication 2 UNIT(S): at 16:43

## 2021-06-06 RX ADMIN — Medication 2 UNIT(S): at 11:59

## 2021-06-06 RX ADMIN — Medication 30 MILLIGRAM(S): at 12:00

## 2021-06-06 RX ADMIN — Medication 100 MILLIGRAM(S): at 05:22

## 2021-06-06 RX ADMIN — HYDROMORPHONE HYDROCHLORIDE 4 MILLIGRAM(S): 2 INJECTION INTRAMUSCULAR; INTRAVENOUS; SUBCUTANEOUS at 06:13

## 2021-06-06 RX ADMIN — Medication 650 MILLIGRAM(S): at 17:27

## 2021-06-06 RX ADMIN — Medication 30 MILLIGRAM(S): at 00:15

## 2021-06-06 RX ADMIN — INSULIN GLARGINE 12 UNIT(S): 100 INJECTION, SOLUTION SUBCUTANEOUS at 22:14

## 2021-06-06 NOTE — OCCUPATIONAL THERAPY INITIAL EVALUATION ADULT - RANGE OF MOTION EXAMINATION
Digits 1 & 2 are half ROM. Digits 3,4,&5./deficits as listed below To fist closure Digits 1 is 3/4 ROM. Digits 2&3 is half ROM. Digits 4&5 is quarter ROM./deficits as listed below

## 2021-06-06 NOTE — CHART NOTE - NSCHARTNOTEFT_GEN_A_CORE
Called MRI to expedite MRI hand. Patient is consentable    Will follow up     Shahrzad Muller PA-C  Department of Medicine  Pager [phone]

## 2021-06-06 NOTE — OCCUPATIONAL THERAPY INITIAL EVALUATION ADULT - RANGE OF MOTION EXAMINATION, UPPER EXTREMITY
Left UE Active ROM was WNL (within normal limits) Left UE Active ROM was WNL (within normal limits)/Right UE Active ROM was WFL (within functional limits)

## 2021-06-07 LAB
ANION GAP SERPL CALC-SCNC: 11 MMOL/L — SIGNIFICANT CHANGE UP (ref 7–14)
BUN SERPL-MCNC: 17 MG/DL — SIGNIFICANT CHANGE UP (ref 7–23)
CALCIUM SERPL-MCNC: 8.5 MG/DL — SIGNIFICANT CHANGE UP (ref 8.4–10.5)
CHLORIDE SERPL-SCNC: 102 MMOL/L — SIGNIFICANT CHANGE UP (ref 98–107)
CO2 SERPL-SCNC: 24 MMOL/L — SIGNIFICANT CHANGE UP (ref 22–31)
CREAT SERPL-MCNC: 0.88 MG/DL — SIGNIFICANT CHANGE UP (ref 0.5–1.3)
GLUCOSE BLDC GLUCOMTR-MCNC: 116 MG/DL — HIGH (ref 70–99)
GLUCOSE BLDC GLUCOMTR-MCNC: 158 MG/DL — HIGH (ref 70–99)
GLUCOSE BLDC GLUCOMTR-MCNC: 159 MG/DL — HIGH (ref 70–99)
GLUCOSE BLDC GLUCOMTR-MCNC: 242 MG/DL — HIGH (ref 70–99)
GLUCOSE SERPL-MCNC: 247 MG/DL — HIGH (ref 70–99)
MAGNESIUM SERPL-MCNC: 1.7 MG/DL — SIGNIFICANT CHANGE UP (ref 1.6–2.6)
PHOSPHATE SERPL-MCNC: 3.4 MG/DL — SIGNIFICANT CHANGE UP (ref 2.5–4.5)
POTASSIUM SERPL-MCNC: 3.5 MMOL/L — SIGNIFICANT CHANGE UP (ref 3.5–5.3)
POTASSIUM SERPL-SCNC: 3.5 MMOL/L — SIGNIFICANT CHANGE UP (ref 3.5–5.3)
SODIUM SERPL-SCNC: 137 MMOL/L — SIGNIFICANT CHANGE UP (ref 135–145)

## 2021-06-07 PROCEDURE — 99233 SBSQ HOSP IP/OBS HIGH 50: CPT

## 2021-06-07 RX ORDER — IBUPROFEN 200 MG
600 TABLET ORAL EVERY 8 HOURS
Refills: 0 | Status: DISCONTINUED | OUTPATIENT
Start: 2021-06-07 | End: 2021-06-08

## 2021-06-07 RX ORDER — METHOCARBAMOL 500 MG/1
750 TABLET, FILM COATED ORAL EVERY 8 HOURS
Refills: 0 | Status: DISCONTINUED | OUTPATIENT
Start: 2021-06-07 | End: 2021-06-08

## 2021-06-07 RX ORDER — HYDROMORPHONE HYDROCHLORIDE 2 MG/ML
1 INJECTION INTRAMUSCULAR; INTRAVENOUS; SUBCUTANEOUS ONCE
Refills: 0 | Status: DISCONTINUED | OUTPATIENT
Start: 2021-06-07 | End: 2021-06-07

## 2021-06-07 RX ORDER — HYDRALAZINE HCL 50 MG
5 TABLET ORAL ONCE
Refills: 0 | Status: DISCONTINUED | OUTPATIENT
Start: 2021-06-07 | End: 2021-06-08

## 2021-06-07 RX ADMIN — Medication 100 MILLIGRAM(S): at 05:31

## 2021-06-07 RX ADMIN — Medication 150 MILLIGRAM(S): at 05:31

## 2021-06-07 RX ADMIN — LOSARTAN POTASSIUM 100 MILLIGRAM(S): 100 TABLET, FILM COATED ORAL at 05:31

## 2021-06-07 RX ADMIN — HYDROMORPHONE HYDROCHLORIDE 1 MILLIGRAM(S): 2 INJECTION INTRAMUSCULAR; INTRAVENOUS; SUBCUTANEOUS at 19:49

## 2021-06-07 RX ADMIN — HYDROMORPHONE HYDROCHLORIDE 1 MILLIGRAM(S): 2 INJECTION INTRAMUSCULAR; INTRAVENOUS; SUBCUTANEOUS at 20:00

## 2021-06-07 RX ADMIN — METHOCARBAMOL 750 MILLIGRAM(S): 500 TABLET, FILM COATED ORAL at 22:30

## 2021-06-07 RX ADMIN — Medication 650 MILLIGRAM(S): at 12:28

## 2021-06-07 RX ADMIN — Medication 600 MILLIGRAM(S): at 19:04

## 2021-06-07 RX ADMIN — Medication 1: at 07:56

## 2021-06-07 RX ADMIN — Medication 150 MILLIGRAM(S): at 17:27

## 2021-06-07 RX ADMIN — Medication 1: at 12:26

## 2021-06-07 RX ADMIN — INSULIN GLARGINE 12 UNIT(S): 100 INJECTION, SOLUTION SUBCUTANEOUS at 21:56

## 2021-06-07 RX ADMIN — METHOCARBAMOL 750 MILLIGRAM(S): 500 TABLET, FILM COATED ORAL at 12:28

## 2021-06-07 RX ADMIN — HYDROMORPHONE HYDROCHLORIDE 4 MILLIGRAM(S): 2 INJECTION INTRAMUSCULAR; INTRAVENOUS; SUBCUTANEOUS at 04:26

## 2021-06-07 RX ADMIN — Medication 2 UNIT(S): at 07:57

## 2021-06-07 RX ADMIN — HYDROMORPHONE HYDROCHLORIDE 4 MILLIGRAM(S): 2 INJECTION INTRAMUSCULAR; INTRAVENOUS; SUBCUTANEOUS at 05:25

## 2021-06-07 RX ADMIN — Medication 2 UNIT(S): at 12:25

## 2021-06-07 NOTE — PROVIDER CONTACT NOTE (OTHER) - SITUATION
Patient's BP is 190/104, asymptomatic
Patient's BP is 177/109, patient states it's because he has pain 10/10 of right hand.

## 2021-06-07 NOTE — PROVIDER CONTACT NOTE (MEDICATION) - BACKGROUND
Admitted for right hand pain S/P Dupuytren's Contracture
Admitted for right hand pain S/P tendon release

## 2021-06-07 NOTE — PROVIDER CONTACT NOTE (OTHER) - ASSESSMENT
/109, HR 87, 97% RA, states 10/10 right hand pain
-104, HR 77, sitting on edge of bed, no complaints from patient, now S/P IVP dilaudid

## 2021-06-07 NOTE — PROVIDER CONTACT NOTE (OTHER) - BACKGROUND
Admitted for pain control of right hand S/P tendon release
Admitted for pain management S/P R hand tendon release surgery

## 2021-06-07 NOTE — DIETITIAN INITIAL EVALUATION ADULT. - OTHER INFO
Pt has a history of DM2, HTN, HLD and Duputytren's Contracture of right hand. Pt presents with pain in right hand s/p surgery for Duputytenn's contracture.  Pt states his appetite and po intake have been good. He had no complaints of GI distress nor of difficulty chewing or swallowing. He has no known food allergies.   Pt states that he was recently prescribed insulin but has not started taking it yet. He also states that he knows the diet but he has been "too lazy" to follow it. Began instructing Pt on diet but he stated he did not need it because he knows what to do. He did accept written information on the diet.  Pt made aware that RDN remains available.

## 2021-06-07 NOTE — CHART NOTE - NSCHARTNOTEFT_GEN_A_CORE
Patient requesting non-narcotic pain medication asking for Robaxin or Baclofen or Flexeril. Preferring Robaxin or baclofen, patient stated he doesn't want to feel sedated from Flexeril or opioids. Spoke with pain medication who saw patient this admission, recommending Robaxin.    Amelie Scruggs NP-C  pager [phone] today   [facility]

## 2021-06-08 ENCOUNTER — TRANSCRIPTION ENCOUNTER (OUTPATIENT)
Age: 56
End: 2021-06-08

## 2021-06-08 VITALS
OXYGEN SATURATION: 100 % | HEART RATE: 94 BPM | RESPIRATION RATE: 18 BRPM | SYSTOLIC BLOOD PRESSURE: 190 MMHG | DIASTOLIC BLOOD PRESSURE: 92 MMHG

## 2021-06-08 DIAGNOSIS — E11.65 TYPE 2 DIABETES MELLITUS WITH HYPERGLYCEMIA: ICD-10-CM

## 2021-06-08 DIAGNOSIS — I10 ESSENTIAL (PRIMARY) HYPERTENSION: ICD-10-CM

## 2021-06-08 DIAGNOSIS — E78.5 HYPERLIPIDEMIA, UNSPECIFIED: ICD-10-CM

## 2021-06-08 LAB
GLUCOSE BLDC GLUCOMTR-MCNC: 144 MG/DL — HIGH (ref 70–99)
GLUCOSE BLDC GLUCOMTR-MCNC: 165 MG/DL — HIGH (ref 70–99)
GLUCOSE BLDC GLUCOMTR-MCNC: 180 MG/DL — HIGH (ref 70–99)

## 2021-06-08 PROCEDURE — 99239 HOSP IP/OBS DSCHRG MGMT >30: CPT

## 2021-06-08 PROCEDURE — 99232 SBSQ HOSP IP/OBS MODERATE 35: CPT

## 2021-06-08 PROCEDURE — 99255 IP/OBS CONSLTJ NEW/EST HI 80: CPT | Mod: GC

## 2021-06-08 RX ORDER — HYDROMORPHONE HYDROCHLORIDE 2 MG/ML
0.5 INJECTION INTRAMUSCULAR; INTRAVENOUS; SUBCUTANEOUS
Refills: 0 | Status: DISCONTINUED | OUTPATIENT
Start: 2021-06-08 | End: 2021-06-08

## 2021-06-08 RX ORDER — IBUPROFEN 200 MG
400 TABLET ORAL EVERY 6 HOURS
Refills: 0 | Status: DISCONTINUED | OUTPATIENT
Start: 2021-06-08 | End: 2021-06-08

## 2021-06-08 RX ORDER — METOPROLOL TARTRATE 50 MG
1 TABLET ORAL
Qty: 0 | Refills: 0 | DISCHARGE
Start: 2021-06-08

## 2021-06-08 RX ORDER — HYDROMORPHONE HYDROCHLORIDE 2 MG/ML
4 INJECTION INTRAMUSCULAR; INTRAVENOUS; SUBCUTANEOUS EVERY 6 HOURS
Refills: 0 | Status: DISCONTINUED | OUTPATIENT
Start: 2021-06-08 | End: 2021-06-08

## 2021-06-08 RX ORDER — KETOROLAC TROMETHAMINE 30 MG/ML
30 SYRINGE (ML) INJECTION EVERY 6 HOURS
Refills: 0 | Status: DISCONTINUED | OUTPATIENT
Start: 2021-06-08 | End: 2021-06-08

## 2021-06-08 RX ORDER — HYDROMORPHONE HYDROCHLORIDE 2 MG/ML
1 INJECTION INTRAMUSCULAR; INTRAVENOUS; SUBCUTANEOUS ONCE
Refills: 0 | Status: DISCONTINUED | OUTPATIENT
Start: 2021-06-08 | End: 2021-06-08

## 2021-06-08 RX ORDER — HYDROMORPHONE HYDROCHLORIDE 2 MG/ML
2 INJECTION INTRAMUSCULAR; INTRAVENOUS; SUBCUTANEOUS EVERY 6 HOURS
Refills: 0 | Status: DISCONTINUED | OUTPATIENT
Start: 2021-06-08 | End: 2021-06-08

## 2021-06-08 RX ORDER — INSULIN LISPRO 100/ML
3 VIAL (ML) SUBCUTANEOUS
Refills: 0 | Status: DISCONTINUED | OUTPATIENT
Start: 2021-06-08 | End: 2021-06-08

## 2021-06-08 RX ORDER — ACETAMINOPHEN 500 MG
650 TABLET ORAL EVERY 6 HOURS
Refills: 0 | Status: DISCONTINUED | OUTPATIENT
Start: 2021-06-08 | End: 2021-06-08

## 2021-06-08 RX ORDER — INSULIN GLARGINE 100 [IU]/ML
14 INJECTION, SOLUTION SUBCUTANEOUS AT BEDTIME
Refills: 0 | Status: DISCONTINUED | OUTPATIENT
Start: 2021-06-08 | End: 2021-06-08

## 2021-06-08 RX ADMIN — HYDROMORPHONE HYDROCHLORIDE 0.5 MILLIGRAM(S): 2 INJECTION INTRAMUSCULAR; INTRAVENOUS; SUBCUTANEOUS at 20:30

## 2021-06-08 RX ADMIN — HYDROMORPHONE HYDROCHLORIDE 1 MILLIGRAM(S): 2 INJECTION INTRAMUSCULAR; INTRAVENOUS; SUBCUTANEOUS at 05:22

## 2021-06-08 RX ADMIN — HYDROMORPHONE HYDROCHLORIDE 0.5 MILLIGRAM(S): 2 INJECTION INTRAMUSCULAR; INTRAVENOUS; SUBCUTANEOUS at 11:18

## 2021-06-08 RX ADMIN — Medication 1: at 12:04

## 2021-06-08 RX ADMIN — Medication 30 MILLIGRAM(S): at 06:10

## 2021-06-08 RX ADMIN — Medication 150 MILLIGRAM(S): at 05:21

## 2021-06-08 RX ADMIN — METHOCARBAMOL 750 MILLIGRAM(S): 500 TABLET, FILM COATED ORAL at 07:53

## 2021-06-08 RX ADMIN — HYDROMORPHONE HYDROCHLORIDE 4 MILLIGRAM(S): 2 INJECTION INTRAMUSCULAR; INTRAVENOUS; SUBCUTANEOUS at 18:00

## 2021-06-08 RX ADMIN — LOSARTAN POTASSIUM 100 MILLIGRAM(S): 100 TABLET, FILM COATED ORAL at 05:22

## 2021-06-08 RX ADMIN — Medication 600 MILLIGRAM(S): at 05:21

## 2021-06-08 RX ADMIN — Medication 1: at 07:53

## 2021-06-08 RX ADMIN — HYDROMORPHONE HYDROCHLORIDE 4 MILLIGRAM(S): 2 INJECTION INTRAMUSCULAR; INTRAVENOUS; SUBCUTANEOUS at 17:10

## 2021-06-08 RX ADMIN — Medication 100 MILLIGRAM(S): at 06:10

## 2021-06-08 RX ADMIN — Medication 2 UNIT(S): at 12:03

## 2021-06-08 RX ADMIN — HYDROMORPHONE HYDROCHLORIDE 0.5 MILLIGRAM(S): 2 INJECTION INTRAMUSCULAR; INTRAVENOUS; SUBCUTANEOUS at 11:33

## 2021-06-08 RX ADMIN — Medication 2 UNIT(S): at 07:53

## 2021-06-08 RX ADMIN — Medication 150 MILLIGRAM(S): at 20:30

## 2021-06-08 RX ADMIN — Medication 2 UNIT(S): at 16:44

## 2021-06-08 RX ADMIN — HYDROMORPHONE HYDROCHLORIDE 1 MILLIGRAM(S): 2 INJECTION INTRAMUSCULAR; INTRAVENOUS; SUBCUTANEOUS at 06:00

## 2021-06-08 NOTE — PROGRESS NOTE ADULT - PROBLEM SELECTOR PLAN 3
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

## 2021-06-08 NOTE — DISCHARGE NOTE NURSING/CASE MANAGEMENT/SOCIAL WORK - PATIENT PORTAL LINK FT
You can access the FollowMyHealth Patient Portal offered by [facility] by registering at the following website: [email or web address]. By joining [employer]’s FollowMyHealth portal, you will also be able to view your health information using other applications (apps) compatible with our system.

## 2021-06-08 NOTE — PROGRESS NOTE ADULT - PROBLEM SELECTOR PROBLEM 2
Dupuytren's contracture of right hand

## 2021-06-08 NOTE — PROGRESS NOTE ADULT - SUBJECTIVE AND OBJECTIVE BOX
Patient is a 56y old  Male who presents with a chief complaint of right hand pain (05 Jun 2021 17:05)    [facility] - Department of Medicine   Inocencia Dasilva DO   Pager: [phone]  Cell: [phone]    SUBJECTIVE / OVERNIGHT EVENTS: No acute overnight events. Pt complaining of swelling in the right hand - unchanged from arrival. States his pain is controlled.     MEDICATIONS  (STANDING):  acetaminophen   Tablet .. 650 milliGRAM(s) Oral every 6 hours  dextrose 40% Gel 15 Gram(s) Oral once  dextrose 5%. 1000 milliLiter(s) (50 mL/Hr) IV Continuous <Continuous>  dextrose 5%. 1000 milliLiter(s) (100 mL/Hr) IV Continuous <Continuous>  dextrose 50% Injectable 25 Gram(s) IV Push once  dextrose 50% Injectable 12.5 Gram(s) IV Push once  dextrose 50% Injectable 25 Gram(s) IV Push once  glucagon  Injectable 1 milliGRAM(s) IntraMuscular once  insulin glargine Injectable (LANTUS) 12 Unit(s) SubCutaneous at bedtime  insulin lispro (ADMELOG) corrective regimen sliding scale   SubCutaneous three times a day before meals  insulin lispro (ADMELOG) corrective regimen sliding scale   SubCutaneous at bedtime  insulin lispro Injectable (ADMELOG) 2 Unit(s) SubCutaneous three times a day before meals  ketorolac   Injectable 30 milliGRAM(s) IV Push every 6 hours  losartan 100 milliGRAM(s) Oral daily  metoprolol succinate  milliGRAM(s) Oral daily  pregabalin 150 milliGRAM(s) Oral every 12 hours    MEDICATIONS  (PRN):  HYDROmorphone   Tablet 2 milliGRAM(s) Oral every 6 hours PRN Moderate Pain (4 - 6)  HYDROmorphone   Tablet 4 milliGRAM(s) Oral every 6 hours PRN Severe Pain (7 - 10)  HYDROmorphone  Injectable 0.5 milliGRAM(s) IV Push every 12 hours PRN breakthrough Pain  senna 2 Tablet(s) Oral at bedtime PRN Constipation      Vital Signs Last 24 Hrs  T(C): 36.7 (06 Jun 2021 17:54), Max: 36.7 (05 Jun 2021 21:17)  T(F): 98 (06 Jun 2021 17:54), Max: 98.1 (06 Jun 2021 05:19)  HR: 84 (06 Jun 2021 17:54) (77 - 84)  BP: 153/78 (06 Jun 2021 17:54) (131/68 - 153/78)  BP(mean): --  RR: 18 (06 Jun 2021 17:54) (18 - 18)  SpO2: 100% (06 Jun 2021 17:54) (100% - 100%)  CAPILLARY BLOOD GLUCOSE      POCT Blood Glucose.: 168 mg/dL (06 Jun 2021 16:41)  POCT Blood Glucose.: 169 mg/dL (06 Jun 2021 11:54)  POCT Blood Glucose.: 183 mg/dL (06 Jun 2021 07:03)  POCT Blood Glucose.: 261 mg/dL (05 Jun 2021 21:58)      PHYSICAL EXAM:  GENERAL: NAD, well-developed  HEAD:  Atraumatic, Normocephalic  EYES: EOMI, conjunctiva and sclera clear  NECK: Supple, No JVD  CHEST/LUNG: Clear to auscultation bilaterally; No wheeze  HEART: Regular rate and rhythm; No murmurs, rubs, or gallops  ABDOMEN: Soft, Nontender, Nondistended; Bowel sounds present  EXTREMITIES:  RUE - unable to make fist with right hand, no sensory deficits, hypertrophied scar in right palm,  mild right hand swelling and tenderness at ventral aspect of wrist  PSYCH: AAOx3  NEUROLOGY: non-focal  SKIN: No rashes or lesions    LABS:                        13.0   6.85  )-----------( 510      ( 06 Jun 2021 07:12 )             40.6     06-06    137  |  99  |  18  ----------------------------<  160<H>  3.1<L>   |  25  |  0.81    Ca    9.0      06 Jun 2021 07:12  Phos  3.2     06-06  Mg     1.8     06-06    TPro  7.5  /  Alb  3.7  /  TBili  0.4  /  DBili  x   /  AST  8   /  ALT  12  /  AlkPhos  68  06-06              RADIOLOGY & ADDITIONAL TESTS:    
CC: Patient is a 56y old  Male who presents with a chief complaint of right hand pain (07 Jun 2021 15:22)    ID following for right wrist pain    Interval History/ROS: Patient remains with R wrist pain, MRI with rim enhancing collection. No fevers,no chills.    Rest of ROS negative.    Allergies  Vasotec (Hives)    ANTIMICROBIALS:      OTHER MEDS:  dextrose 40% Gel 15 Gram(s) Oral once  dextrose 5%. 1000 milliLiter(s) IV Continuous <Continuous>  dextrose 5%. 1000 milliLiter(s) IV Continuous <Continuous>  dextrose 50% Injectable 25 Gram(s) IV Push once  dextrose 50% Injectable 12.5 Gram(s) IV Push once  dextrose 50% Injectable 25 Gram(s) IV Push once  glucagon  Injectable 1 milliGRAM(s) IntraMuscular once  insulin glargine Injectable (LANTUS) 12 Unit(s) SubCutaneous at bedtime  insulin lispro (ADMELOG) corrective regimen sliding scale   SubCutaneous three times a day before meals  insulin lispro (ADMELOG) corrective regimen sliding scale   SubCutaneous at bedtime  insulin lispro Injectable (ADMELOG) 2 Unit(s) SubCutaneous three times a day before meals  losartan 100 milliGRAM(s) Oral daily  methocarbamol 750 milliGRAM(s) Oral every 8 hours PRN  metoprolol succinate  milliGRAM(s) Oral daily  pregabalin 150 milliGRAM(s) Oral every 12 hours  senna 2 Tablet(s) Oral at bedtime PRN    PE:    Vital Signs Last 24 Hrs  T(C): 36.3 (07 Jun 2021 14:03), Max: 36.8 (06 Jun 2021 22:07)  T(F): 97.4 (07 Jun 2021 14:03), Max: 98.3 (06 Jun 2021 22:07)  HR: 84 (07 Jun 2021 14:03) (68 - 84)  BP: 143/72 (07 Jun 2021 14:03) (136/83 - 153/78)  BP(mean): --  RR: 18 (07 Jun 2021 14:03) (16 - 18)  SpO2: 99% (07 Jun 2021 14:03) (98% - 100%)    Gen: AOx3, NAD  CV: S1+S2 normal, no murmurs  Resp: Clear bilat, no resp distress  Abd: Soft, nontender, +BS  Ext: No LE edema, no wounds  : No Willis  IV/Skin: No thrombophlebitis, R wrist with tenderness at the wrist, improvement in making fist with R hand, no erythema  Neuro: no focal deficits    LABS:                          13.0   6.85  )-----------( 510      ( 06 Jun 2021 07:12 )             40.6       06-07    137  |  102  |  17  ----------------------------<  247<H>  3.5   |  24  |  0.88    Ca    8.5      07 Jun 2021 09:51  Phos  3.4     06-07  Mg     1.7     06-07    TPro  7.5  /  Alb  3.7  /  TBili  0.4  /  DBili  x   /  AST  8   /  ALT  12  /  AlkPhos  68  06-06    MICROBIOLOGY:  v  .Blood Blood-Peripheral  06-04-21   No growth to date.  --  --      .Urine Clean Catch (Midstream)  05-21-21   No growth  --  --      .Blood Blood-Peripheral  05-21-21   No Growth Final  --  --    RADIOLOGY:    < from: MR Hand w/wo IV Cont, Right (06.06.21 @ 23:22) >  IMPRESSION:    1.  Moderate tenosynovitis of the flexor tendons from the carpal tunnel into the digits concerning for infectious or inflammatory etiology.  2.  Small rim-enhancing fluid collection just beyond the carpal tunnel concerning for infection.  3.  Edema throughout the hamate bone. Nonspecific finding that could represent infection or inflammatory change. Correlate for overlying skin wound    < end of copied text >  
CC: Patient is a 56y old  Male who presents with a chief complaint of right hand pain (08 Jun 2021 16:51)    ID following for right wrist collection    Interval History/ROS: Patient remains afebrile. Still remains with right wrist pain.     Rest of ROS negative.    Allergies  Vasotec (Hives)    ANTIMICROBIALS:      OTHER MEDS:  acetaminophen   Tablet .. 650 milliGRAM(s) Oral every 6 hours  dextrose 40% Gel 15 Gram(s) Oral once  dextrose 5%. 1000 milliLiter(s) IV Continuous <Continuous>  dextrose 5%. 1000 milliLiter(s) IV Continuous <Continuous>  dextrose 50% Injectable 25 Gram(s) IV Push once  dextrose 50% Injectable 12.5 Gram(s) IV Push once  dextrose 50% Injectable 25 Gram(s) IV Push once  glucagon  Injectable 1 milliGRAM(s) IntraMuscular once  hydrALAZINE Injectable 5 milliGRAM(s) IV Push once  HYDROmorphone   Tablet 2 milliGRAM(s) Oral every 6 hours PRN  HYDROmorphone   Tablet 4 milliGRAM(s) Oral every 6 hours PRN  HYDROmorphone  Injectable 0.5 milliGRAM(s) IV Push two times a day PRN  ibuprofen  Tablet. 600 milliGRAM(s) Oral every 8 hours  insulin glargine Injectable (LANTUS) 14 Unit(s) SubCutaneous at bedtime  insulin lispro (ADMELOG) corrective regimen sliding scale   SubCutaneous three times a day before meals  insulin lispro (ADMELOG) corrective regimen sliding scale   SubCutaneous at bedtime  insulin lispro Injectable (ADMELOG) 3 Unit(s) SubCutaneous three times a day before meals  losartan 100 milliGRAM(s) Oral daily  methocarbamol 750 milliGRAM(s) Oral every 8 hours PRN  metoprolol succinate  milliGRAM(s) Oral daily  pregabalin 150 milliGRAM(s) Oral every 12 hours  senna 2 Tablet(s) Oral at bedtime PRN    PE:    Vital Signs Last 24 Hrs  T(C): 36.7 (08 Jun 2021 13:37), Max: 36.8 (07 Jun 2021 21:20)  T(F): 98 (08 Jun 2021 13:37), Max: 98.3 (07 Jun 2021 21:20)  HR: 82 (08 Jun 2021 13:37) (73 - 82)  BP: 122/80 (08 Jun 2021 13:37) (122/80 - 190/104)  BP(mean): --  RR: 18 (08 Jun 2021 13:37) (18 - 19)  SpO2: 99% (08 Jun 2021 13:37) (99% - 100%)    Gen: AOx3, NAD  CV: S1+S2 normal, no murmurs  Resp: Clear bilat, no resp distress  Abd: Soft, nontender, +BS  Ext: No LE edema, no wounds  : No Willis  IV/Skin: No thrombophlebitis, right hand swelling, no erythema  Neuro: no focal deficits    LABS:        06-07    137  |  102  |  17  ----------------------------<  247<H>  3.5   |  24  |  0.88    Ca    8.5      07 Jun 2021 09:51  Phos  3.4     06-07  Mg     1.7     06-07    MICROBIOLOGY:  v  .Blood Blood-Peripheral  06-04-21   No growth to date.  --  --      .Urine Clean Catch (Midstream)  05-21-21   No growth  --  --      .Blood Blood-Peripheral  05-21-21   No Growth Final  --  --    RADIOLOGY:    < from: MR Hand w/wo IV Cont, Right (06.06.21 @ 23:22) >  IMPRESSION:    1.  Moderate tenosynovitis of the flexor tendons from the carpal tunnel into the digits concerning for infectious or inflammatory etiology.  2.  Small rim-enhancing fluid collection just beyond the carpal tunnel concerning for infection.  3.  Edema throughout the hamate bone. Nonspecific finding that could represent infection or inflammatory change. Correlate for overlying skin wound    < end of copied text >  
[facility] Division of Hospital Medicine  Juni Sanders MD  In House Pager [phone]    Patient is a 56y old  Male who presents with a chief complaint of right hand pain (08 Jun 2021 12:50)      SUBJECTIVE / OVERNIGHT EVENTS:  No overnight events. Labs and vitals reviewed.   Patient seen and examined at bedside, no acute complaints. still having the same hand pain. Patient was evaluated by Dr. Ronaldo Au (plastic surgery) yesterday, who does not think the hand require surgical intervention. Patient now agrees to transfer to [facility] under care of his hand surgeon (Dr. Angeles).   No fever, no chills, no SOB, no CP, no n/v/d, no abd pain, no dysuria      MEDICATIONS  (STANDING):  acetaminophen   Tablet .. 650 milliGRAM(s) Oral every 6 hours  dextrose 40% Gel 15 Gram(s) Oral once  dextrose 5%. 1000 milliLiter(s) (50 mL/Hr) IV Continuous <Continuous>  dextrose 5%. 1000 milliLiter(s) (100 mL/Hr) IV Continuous <Continuous>  dextrose 50% Injectable 25 Gram(s) IV Push once  dextrose 50% Injectable 12.5 Gram(s) IV Push once  dextrose 50% Injectable 25 Gram(s) IV Push once  glucagon  Injectable 1 milliGRAM(s) IntraMuscular once  hydrALAZINE Injectable 5 milliGRAM(s) IV Push once  ibuprofen  Tablet. 600 milliGRAM(s) Oral every 8 hours  insulin glargine Injectable (LANTUS) 12 Unit(s) SubCutaneous at bedtime  insulin lispro (ADMELOG) corrective regimen sliding scale   SubCutaneous three times a day before meals  insulin lispro (ADMELOG) corrective regimen sliding scale   SubCutaneous at bedtime  insulin lispro Injectable (ADMELOG) 2 Unit(s) SubCutaneous three times a day before meals  losartan 100 milliGRAM(s) Oral daily  metoprolol succinate  milliGRAM(s) Oral daily  pregabalin 150 milliGRAM(s) Oral every 12 hours    MEDICATIONS  (PRN):  HYDROmorphone   Tablet 2 milliGRAM(s) Oral every 6 hours PRN Moderate Pain (4 - 6)  HYDROmorphone   Tablet 4 milliGRAM(s) Oral every 6 hours PRN Severe Pain (7 - 10)  HYDROmorphone  Injectable 0.5 milliGRAM(s) IV Push two times a day PRN Severe Pain (7 - 10)  methocarbamol 750 milliGRAM(s) Oral every 8 hours PRN moderate/severe pain  senna 2 Tablet(s) Oral at bedtime PRN Constipation    CAPILLARY BLOOD GLUCOSE      POCT Blood Glucose.: 144 mg/dL (08 Jun 2021 16:41)  POCT Blood Glucose.: 180 mg/dL (08 Jun 2021 11:41)  POCT Blood Glucose.: 165 mg/dL (08 Jun 2021 07:26)  POCT Blood Glucose.: 242 mg/dL (07 Jun 2021 21:48)    I&O's Summary    07 Jun 2021 07:01  -  08 Jun 2021 07:00  --------------------------------------------------------  IN: 720 mL / OUT: 0 mL / NET: 720 mL        PHYSICAL EXAM:  Vital Signs Last 24 Hrs  T(C): 36.7 (08 Jun 2021 13:37), Max: 36.8 (07 Jun 2021 16:55)  T(F): 98 (08 Jun 2021 13:37), Max: 98.3 (07 Jun 2021 16:55)  HR: 82 (08 Jun 2021 13:37) (73 - 87)  BP: 122/80 (08 Jun 2021 13:37) (122/80 - 190/104)  BP(mean): --  RR: 18 (08 Jun 2021 13:37) (18 - 19)  SpO2: 99% (08 Jun 2021 13:37) (97% - 100%)    Gen: NAD; resting in bed.  Pulm: no respiratory distress; CTA b/l; no wheezing  Cards: RRR, nl S1/S2; no obvious murmurs  Abd: soft; NT on exam  Ext: no cyanosis; no edema; R hand pain, with right palm hypertrophic scar.   Skin: no rash; no cyanosis    LABS:    06-07    137  |  102  |  17  ----------------------------<  247<H>  3.5   |  24  |  0.88    Ca    8.5      07 Jun 2021 09:51  Phos  3.4     06-07  Mg     1.7     06-07                  RADIOLOGY & ADDITIONAL TESTS:  Results Reviewed: Y  Imaging Personally Reviewed: Y  Electrocardiogram Personally Reviewed: Y    COORDINATION OF CARE:  Care Discussed with Consultants/Other Providers [Y/N]: Y  Prior or Outpatient Records Reviewed [Y/N]: Y  
[facility] Division of Hospital Medicine  Juni Sanders MD  In House Pager [phone]    Patient is a 56y old  Male who presents with a chief complaint of right hand pain (07 Jun 2021 15:49)      SUBJECTIVE / OVERNIGHT EVENTS:  No overnight events. Labs and vitals reviewed. no leukocytosis, no fever. MR hand showing localized collection and bone edema.   Patient seen and examined at bedside, no acute complaints, still having persistent hand pain.   No fever, no chills, no SOB, no CP, no n/v/d, no abd pain, no dysuria      MEDICATIONS  (STANDING):  dextrose 40% Gel 15 Gram(s) Oral once  dextrose 5%. 1000 milliLiter(s) (50 mL/Hr) IV Continuous <Continuous>  dextrose 5%. 1000 milliLiter(s) (100 mL/Hr) IV Continuous <Continuous>  dextrose 50% Injectable 25 Gram(s) IV Push once  dextrose 50% Injectable 12.5 Gram(s) IV Push once  dextrose 50% Injectable 25 Gram(s) IV Push once  glucagon  Injectable 1 milliGRAM(s) IntraMuscular once  insulin glargine Injectable (LANTUS) 12 Unit(s) SubCutaneous at bedtime  insulin lispro (ADMELOG) corrective regimen sliding scale   SubCutaneous three times a day before meals  insulin lispro (ADMELOG) corrective regimen sliding scale   SubCutaneous at bedtime  insulin lispro Injectable (ADMELOG) 2 Unit(s) SubCutaneous three times a day before meals  losartan 100 milliGRAM(s) Oral daily  metoprolol succinate  milliGRAM(s) Oral daily  pregabalin 150 milliGRAM(s) Oral every 12 hours    MEDICATIONS  (PRN):  methocarbamol 750 milliGRAM(s) Oral every 8 hours PRN moderate/severe pain  senna 2 Tablet(s) Oral at bedtime PRN Constipation    CAPILLARY BLOOD GLUCOSE      POCT Blood Glucose.: 116 mg/dL (07 Jun 2021 16:37)  POCT Blood Glucose.: 158 mg/dL (07 Jun 2021 11:26)  POCT Blood Glucose.: 159 mg/dL (07 Jun 2021 07:32)  POCT Blood Glucose.: 159 mg/dL (06 Jun 2021 22:12)    I&O's Summary    06 Jun 2021 07:01  -  07 Jun 2021 07:00  --------------------------------------------------------  IN: 750 mL / OUT: 0 mL / NET: 750 mL    07 Jun 2021 07:01  -  07 Jun 2021 17:44  --------------------------------------------------------  IN: 720 mL / OUT: 0 mL / NET: 720 mL        PHYSICAL EXAM:  Vital Signs Last 24 Hrs  T(C): 36.8 (07 Jun 2021 16:55), Max: 36.8 (06 Jun 2021 22:07)  T(F): 98.3 (07 Jun 2021 16:55), Max: 98.3 (06 Jun 2021 22:07)  HR: 87 (07 Jun 2021 16:55) (68 - 87)  BP: 172/109 (07 Jun 2021 16:55) (136/83 - 172/109)  BP(mean): --  RR: 19 (07 Jun 2021 16:55) (16 - 19)  SpO2: 97% (07 Jun 2021 16:55) (97% - 100%)    Gen: NAD; resting in bed.  Pulm: no respiratory distress; CTA b/l; no wheezing  Cards: RRR, nl S1/S2; no obvious murmurs  Abd: soft; NT on exam  Ext: no cyanosis; no edema; Right hand tenderness in the medial aspect.   Skin: no rash; no cyanosis; R palm surgical wound.     LABS:                        13.0   6.85  )-----------( 510      ( 06 Jun 2021 07:12 )             40.6     06-07    137  |  102  |  17  ----------------------------<  247<H>  3.5   |  24  |  0.88    Ca    8.5      07 Jun 2021 09:51  Phos  3.4     06-07  Mg     1.7     06-07    TPro  7.5  /  Alb  3.7  /  TBili  0.4  /  DBili  x   /  AST  8   /  ALT  12  /  AlkPhos  68  06-06              Culture - Blood (collected 04 Jun 2021 21:11)  Source: .Blood Blood-Venous  Preliminary Report (05 Jun 2021 22:01):    No growth to date.    Culture - Blood (collected 04 Jun 2021 21:11)  Source: .Blood Blood-Peripheral  Preliminary Report (05 Jun 2021 22:01):    No growth to date.        RADIOLOGY & ADDITIONAL TESTS:  Results Reviewed: Y  Imaging Personally Reviewed: Y  Electrocardiogram Personally Reviewed: Y    COORDINATION OF CARE:  Care Discussed with Consultants/Other Providers [Y/N]: Y  Prior or Outpatient Records Reviewed [Y/N]: Y

## 2021-06-08 NOTE — DISCHARGE NOTE PROVIDER - HOSPITAL COURSE
56M w/ PMH pf DM Type 2 and HTN p/w right hand pain since 5/7 s/p surgery to right hand for Dupuytren's contracture at [facility]. MR hand concern for OM of hand and localized collection.     R/O Osteomyelitis  - Presented with persistent right hand and increased wrist pain. Unable to make a closed fist. No erythema. No fevers. WBC WNL. Elevated ESR/ CRP.  - Completed a course of abx - doxy and augmentin at home  - No signs of active infection at this time  - MR w/ concern for localized collection and OM  - Discussed with patient's hand surgeon, Dr. Jenny Angeles, who would take patient as transfer to [facility], patient agreed to transfer 6/8.   - Discussed with ID, would hold off on abx for now if surgical debridement if planned    Dupuytren's contracture of right hand  - s/p surgery on 5/7 and nerve injection on 5/19, now with worsening pain  - XR right hand/wrist unremarkable, MRI right hand showed collection and bone edema, monitor off Abx per ID recs  - Pain management consulted and recommendations placed  - Occupational therapy recommending outpatient therapy    Uncontrolled diabetes mellitus  - Hgb A1C: 11.0%, states he was started on insulin recently at most recent hospitalization, hold metformin  - FS controlled, Continue lantus 12 U qhs, premeal admelog 2 U TID, SSI premeals  - consistent carb diet, registered dietician consult  - States he was taught insulin use previously, he is also a pharmacist  - c/w losartan    Dispo: [facility] for further evaluation by primary hand surgeon.    On 6/8 this case was reviewed with Dr. Sanders, the patient is medically stable and optimized for transfer to [facility]. All medications were reviewed and prescriptions were adjusted accordingly. 56M w/ PMH pf DM Type 2 and HTN p/w right hand pain since 5/7 s/p surgery to right hand for Dupuytren's contracture at [facility]. MR hand concern for OM of hand and localized collection.     R/O Osteomyelitis  - Presented with persistent right hand and increased wrist pain. Unable to make a closed fist. No erythema. No fevers. WBC WNL. Elevated ESR/ CRP.  - Completed a course of abx - doxy and augmentin at home  - No signs of active infection at this time  - MR w/ concern for localized collection and OM  - Discussed with patient's hand surgeon, Dr. Jenny Angeles, who would take patient as transfer to [facility], patient agreed to transfer 6/8.   - Discussed with ID, would hold off on abx for now if surgical debridement if planned    Dupuytren's contracture of right hand  - s/p surgery on 5/7 and nerve injection on 5/19, now with worsening pain  - XR right hand/wrist unremarkable, MRI right hand showed collection and bone edema, monitor off Abx per ID recs  - Pain management consulted and recommendations placed  - Occupational therapy recommending outpatient therapy    Uncontrolled diabetes mellitus  - Hgb A1C: 11.0%, states he was started on insulin recently at most recent hospitalization, hold metformin  - FS controlled, Continue lantus 12 U qhs, premeal admelog 2 U TID, SSI premeals  - consistent carb diet, registered dietician consult  - 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    On 6/8 this case was reviewed with Dr. Sanders, the patient is medically stable and optimized for transfer to [facility]. All medications were reviewed and prescriptions were adjusted accordingly.     Attending Addendum  56M with PMH uncontrolled T2DM, HTN presented to [facility] ED with right hand pain. patient is s/p hand surgery for Dupuytren's contracture on 5/7. He was recently admitted at [facility] for Right hand pain and treated for cellulitis and completed course of Augmentin/Doxycycline. He presented to [facility] ED due to persistent pain. during hospitalization, he had MR hand which showed, 1.  Moderate tenosynovitis of the flexor tendons from the carpal tunnel into the digits concerning for infectious or inflammatory etiology. 2.  Small rim-enhancing fluid collection just beyond the carpal tunnel concerning for infection. 3.  Edema throughout the hamate bone. Nonspecific finding that could represent infection or inflammatory change. Correlate for overlying skin wound. He was evaluated by plastic surgery at [facility] which does not think any intervention is required at this time. Discussed with ID, would hold abx if biopsy/washout if planned to better facility treatment as patient is clinically non-toxic and downtrending inflammatory markers. Discussed with Dr. Angeles who agrees to accept patient to be transferred to [facility]. Patient is medically stable for transfer.

## 2021-06-08 NOTE — CONSULT NOTE ADULT - SUBJECTIVE AND OBJECTIVE BOX
HPI: 56 yr old man PMH DM Type 2 recently started on insulin (has not started using yet), HTN p/w right hand pain since 5/7 s/p surgery to right hand for Dupuytren's contracture at [facility].  Endocrine consult requested for uncontrolled Dm2. A1c 11.0%    Endocrine history  Patient used to follow with Dr Millard, last visit 8/2020  Patient reports adherence to metformin 1000mg BID. Self discontinued glimepiride and trulicity. Was told to start Lantus 12units qhs however has yet to start basal insulin.   Has been treating DM with natural remedies.   Does not check FS  Does not report DM retinopathy or neuropathy       PAST MEDICAL & SURGICAL HISTORY:  Diabetes mellitus II  Hyperuricemia  Dyslipidemia  Anal fistula  Sprain, bicep  Hx of appendectomy  1987  Anal fistula  Ananl fistula repair 2013  Tendon tear, ankle, left, sequela  2016    FAMILY HISTORY:  Family history of diabetic complications (Mother)    Social History:  Does not report smoking or alcohol use     Outpatient Medications:  Basaglar KwikPen 100 units/mL subcutaneous solution: 12 unit(s) subcutaneous once a day (at bedtime) (05 Jun 2021 17:10)  metFORMIN 1000 mg oral tablet: 1 tab(s) orally 2 times a day (05 Jun 2021 17:10)  metoprolol succinate 100 mg oral tablet, extended release: 1 tab(s) orally once a day (08 Jun 2021 13:00)      MEDICATIONS  (STANDING):  acetaminophen   Tablet .. 650 milliGRAM(s) Oral every 6 hours  dextrose 40% Gel 15 Gram(s) Oral once  dextrose 5%. 1000 milliLiter(s) (50 mL/Hr) IV Continuous <Continuous>  dextrose 5%. 1000 milliLiter(s) (100 mL/Hr) IV Continuous <Continuous>  dextrose 50% Injectable 25 Gram(s) IV Push once  dextrose 50% Injectable 12.5 Gram(s) IV Push once  dextrose 50% Injectable 25 Gram(s) IV Push once  glucagon  Injectable 1 milliGRAM(s) IntraMuscular once  hydrALAZINE Injectable 5 milliGRAM(s) IV Push once  ibuprofen  Tablet. 600 milliGRAM(s) Oral every 8 hours  insulin glargine Injectable (LANTUS) 12 Unit(s) SubCutaneous at bedtime  insulin lispro (ADMELOG) corrective regimen sliding scale   SubCutaneous three times a day before meals  insulin lispro (ADMELOG) corrective regimen sliding scale   SubCutaneous at bedtime  insulin lispro Injectable (ADMELOG) 2 Unit(s) SubCutaneous three times a day before meals  losartan 100 milliGRAM(s) Oral daily  metoprolol succinate  milliGRAM(s) Oral daily  pregabalin 150 milliGRAM(s) Oral every 12 hours    MEDICATIONS  (PRN):  HYDROmorphone   Tablet 2 milliGRAM(s) Oral every 6 hours PRN Moderate Pain (4 - 6)  HYDROmorphone   Tablet 4 milliGRAM(s) Oral every 6 hours PRN Severe Pain (7 - 10)  HYDROmorphone  Injectable 0.5 milliGRAM(s) IV Push two times a day PRN Severe Pain (7 - 10)  methocarbamol 750 milliGRAM(s) Oral every 8 hours PRN moderate/severe pain  senna 2 Tablet(s) Oral at bedtime PRN Constipation    Allergies  Vasotec (Hives)    Review of Systems:  Constitutional: No fever  Eyes: No blurry vision  Neuro: No tremors  HEENT: No pain  Cardiovascular: No chest pain, palpitations  Respiratory: No SOB, no cough  GI: No nausea, vomiting, abdominal pain  : No dysuria  MS: right hand pain   Endocrine: no polyuria, polydipsia  Hem/lymph: no swelling  Osteoporosis: no fractures    ALL OTHER SYSTEMS REVIEWED AND NEGATIVE  PHYSICAL EXAM:  VITALS: T(C): 36.7 (06-08-21 @ 13:37)  T(F): 98 (06-08-21 @ 13:37), Max: 98.3 (06-07-21 @ 16:55)  HR: 82 (06-08-21 @ 13:37) (73 - 87)  BP: 122/80 (06-08-21 @ 13:37) (122/80 - 190/104)  RR:  (18 - 19)  SpO2:  (97% - 100%)  Wt(kg): 68kg   GENERAL: NAD, well-groomed, well-developed  EYES: No proptosis,  anicteric  HEENT:  Atraumatic, Normocephalic, moist mucous membranes  THYROID: Normal size, no palpable nodules, nontender   RESPIRATORY: Clear to auscultation bilaterally; No rales, rhonchi, wheezing  CARDIOVASCULAR: Regular rate and rhythm; No murmurs; no peripheral edema  GI: Soft, nontender, non distended, normal bowel sounds  SKIN: Dry, intact, No rashes or lesions  MUSCULOSKELETAL: Full range of motion, normal strength  NEURO: sensation intact, extraocular movements intact, no tremor  PSYCH: Alert and oriented x 3,    POCT Blood Glucose.: 144 mg/dL (06-08-21 @ 16:41)  POCT Blood Glucose.: 180 mg/dL (06-08-21 @ 11:41)  POCT Blood Glucose.: 165 mg/dL (06-08-21 @ 07:26)  POCT Blood Glucose.: 242 mg/dL (06-07-21 @ 21:48)  POCT Blood Glucose.: 116 mg/dL (06-07-21 @ 16:37)  POCT Blood Glucose.: 158 mg/dL (06-07-21 @ 11:26)  POCT Blood Glucose.: 159 mg/dL (06-07-21 @ 07:32)  POCT Blood Glucose.: 159 mg/dL (06-06-21 @ 22:12)  POCT Blood Glucose.: 168 mg/dL (06-06-21 @ 16:41)  POCT Blood Glucose.: 169 mg/dL (06-06-21 @ 11:54)  POCT Blood Glucose.: 183 mg/dL (06-06-21 @ 07:03)  POCT Blood Glucose.: 261 mg/dL (06-05-21 @ 21:58)                            13.0   6.85  )-----------( 510      ( 06 Jun 2021 07:12 )             40.6       06-07    137  |  102  |  17  ----------------------------<  247<H>  3.5   |  24  |  0.88    EGFR if : 112  EGFR if non : 97    Ca    8.5      06-07  Mg     1.7     06-07  Phos  3.4     06-07    TPro  7.5  /  Alb  3.7  /  TBili  0.4  /  DBili  x   /  AST  8   /  ALT  12  /  AlkPhos  68  06-06      Thyroid Function Tests:  05-22 @ 07:19 TSH 0.92 FreeT4 -- T3 -- Anti TPO -- Anti Thyroglobulin Ab -- TSI --    05-22 Chol 192 Direct LDL -- LDL calculated 95 HDL 52 Trig 227<H>

## 2021-06-08 NOTE — PROGRESS NOTE ADULT - PROBLEM SELECTOR PLAN 2
s/p surgery on 5/7 and nerve injection on 5/19, now with worsening pain  XR right hand/wrist unremarkable, F/U MRI right hand, monitor off Abx per ID recs  Consider neuro eval as outpatient  Appreciate pain medicine recs:  DC Gabapentin, order Lyrica 150mg q 12 hours x 5 days.   po Dilaudid 2 mg every 6 hours PRN for moderate pain, PO Dilaudid 4 mg every 6 hours PRN for severe pain, IV Dilaudid 0.5 mg every 12 hours PRN for severe breakthrough pain.   PO Tylenol 650 mg every 6 hours standing x2 days, IV Toradol 30 mg every 6 hours x 4-6 doses, then order po Motrin 400 mg every 6 hours standing x2 days, then PRN for pain.   Occupational Therapy consult for right hand strengthening exercises, holistic therapy; rec outpatient therapy
s/p surgery on 5/7 and nerve injection on 5/19, now with worsening pain  XR right hand/wrist unremarkable, MRI right hand showed collection and bone edema, monitor off Abx per ID recs  Appreciate pain medicine recs:  DC Gabapentin, order Lyrica 150mg q 12 hours x 5 days.   po Dilaudid 2 mg every 6 hours PRN for moderate pain, PO Dilaudid 4 mg every 6 hours PRN for severe pain, IV Dilaudid 0.5 mg every 12 hours PRN for severe breakthrough pain.   PO Tylenol 650 mg every 6 hours standing x2 days, IV Toradol 30 mg every 6 hours x 4-6 doses, then order po Motrin 400 mg every 6 hours standing x2 days, then PRN for pain.   Occupational Therapy consult for right hand strengthening exercises, holistic therapy; rec outpatient therapy
s/p surgery on 5/7 and nerve injection on 5/19, now with worsening pain  XR right hand/wrist unremarkable, MRI right hand showed collection and bone edema, monitor off Abx per ID recs  Appreciate pain medicine recs:  DC Gabapentin, order Lyrica 150mg q 12 hours x 5 days.   po Dilaudid 2 mg every 6 hours PRN for moderate pain, PO Dilaudid 4 mg every 6 hours PRN for severe pain, IV Dilaudid 0.5 mg every 12 hours PRN for severe breakthrough pain.   PO Tylenol 650 mg every 6 hours standing x2 days, IV Toradol 30 mg every 6 hours x 4-6 doses, then order po Motrin 400 mg every 6 hours standing x2 days, then PRN for pain.   Occupational Therapy consult for right hand strengthening exercises, holistic therapy; rec outpatient therapy

## 2021-06-08 NOTE — PROGRESS NOTE ADULT - PROBLEM SELECTOR PLAN 4
ambulate as tolerated, IMPROVE score low  consistent carb diet

## 2021-06-08 NOTE — CHART NOTE - NSCHARTNOTEFT_GEN_A_CORE
Notified by RN that patient is unsatisfied with current pain regimen. Patient admitted for pain control due to right hand pain since 5/7 s/p surgery for Dupuytren's contracture at [facility]. Last updated recommendation from June 5th is to-> DC Gabapentin, order Lyrica 150mg q 12 hours x 5 days. po Dilaudid 2 mg every 6 hours PRN for moderate pain, PO Dilaudid 4 mg every 6 hours PRN for severe pain, IV Dilaudid 0.5 mg every 12 hours PRN for severe breakthrough pain.   PO Tylenol 650 mg every 6 hours standing x2 days, IV Toradol 30 mg every 6 hours x 4-6 doses, then order po Motrin 400 mg every 6 hours standing x2 days, then PRN for pain.    Patient currently complaining of severe pain in R hand and upset that pain medications were discontinued. Patient seen and assessed at bedside. During physical exam, patient unable to make fist with right hand, no sensory deficits, hypertrophied scar in right palm, tenderness at ventral aspect of wrist. Radial pulses intact bilaterally. Capillary refill < 2 seconds on all digits.  Overnight, Dilaudid 1mg IV push given x2 for pain. Consider pain management follow up in am for new recommendations. Notified by RN that patient is unsatisfied with current pain regimen. Patient admitted for pain control due to right hand pain since 5/7 s/p surgery for Dupuytren's contracture at [facility]. Last updated recommendation from June 5th is to-> DC Gabapentin, order Lyrica 150mg q 12 hours x 5 days. po Dilaudid 2 mg every 6 hours PRN for moderate pain, PO Dilaudid 4 mg every 6 hours PRN for severe pain, IV Dilaudid 0.5 mg every 12 hours PRN for severe breakthrough pain.   PO Tylenol 650 mg every 6 hours standing x2 days, IV Toradol 30 mg every 6 hours x 4-6 doses, then order po Motrin 400 mg every 6 hours standing x2 days, then PRN for pain.    Patient currently complaining of severe pain in R hand and upset that pain medications were discontinued. Patient seen and assessed at bedside. During physical exam, patient unable to make fist with right hand, no sensory deficits, hypertrophied scar in right palm, tenderness at ventral aspect of wrist. Radial pulses intact bilaterally. Capillary refill < 2 seconds on all digits.  Overnight, Dilaudid 1mg IV push given x2 for pain. Consider pain management follow up in am for new recommendations.  Will continue to monitor patient closely.    Whitney Ashley PA-C  pager [phone]

## 2021-06-08 NOTE — CONSULT NOTE ADULT - ATTENDING COMMENTS
56 year old male with DM, HTN presenting with right hand pain since prior surgery on 5/7 for Dupuytren's contracture at [facility]. He was seen after surgery to have stitches removed, and had an injection into his wrist - ?nerve block resulting in worsening pain and swelling afterwards. His course was complicated with post op cellulitis, admitted to [facility] on 5/21 and was dsicharged on vanco/ augmentin. He returns to the ED at [facility] with persistent right hand and increased wrist pain. Still unable to make a closed fist. No erythema. No fevers. WBC WNL. He does have an elevated ESR/ CRP.    Recommend:  #Right hand infection/ cellulitis  -Completed a course of abx - doxy and augmentin at home  -No signs of active infection    #Right hand pain/ decreased ROM  -Patient remains with wrist pain - would check MRI given recent procedure to r/o OM (also with elevated ESR/ CRP)  -No signs of active infection which is reassuring - can monitor off abx for now.    Wicho Chacko MD  Pager [phone]  After 5pm/weekends call [phone]    Discussed plan with CDU team.
Patient seen and evaluated with Dr. Casillas on 6/8/21.  Uncontrolled DM2 HbA1c 11%  Recommend basal insulin plus metformin for dc.  Would benefit from outpatient endocrine follow up.  Endocrine team consulted for uncontrolled diabetes. Patient is high risk with high level decision making due to uncontrolled diabetes which places patient at high risk for cardiovascular and cerebrovascular events. Patient with lability of glucose requiring close monitoring and insulin adjustments.    Juan Ramon Araya MD  Division of Endocrinology  Pager: [phone]    If after 6PM or before 9AM, or on weekends/holidays, please call endocrine answering service for assistance ([phone]).  For nonurgent matters email [email or web address] for assistance.

## 2021-06-08 NOTE — PROGRESS NOTE ADULT - PROBLEM SELECTOR PLAN 1
Presenting with ersistent right hand and increased wrist pain. Unable to make a closed fist. No erythema. No fevers. WBC WNL. Elevated ESR/ CRP.  - Completed a course of abx - doxy and augmentin at home  - No signs of active infection  - MR concern for localized collection and OM.  - discussed with patient hand surgeon, Dr. Jenny Angeles, who would take patient as transfer to [facility], but patient refuse the transfer and request to be evaluated by hand surgery here.   - Hand surgery consulted and evaluation pending.   - discussed with ID, would hold off on abx for now if surgical debridement if planned.
Presenting with persistent right hand and increased wrist pain. Unable to make a closed fist. No erythema. No fevers. WBC WNL. Elevated ESR/ CRP.  - Completed a course of abx - doxy and augmentin at home  - No signs of systemic infection  - MR concern for localized collection and OM.  - Hand surgery consulted and evaluated patient, who does not think any surgical intervention is needed at this time.   - discussed with patient hand surgeon, Dr. Jenny Angeles, who would take patient as transfer to [place], patient agrees for transfer.   - discussed with ID, would hold off on abx for now if surgical debridement if planned.  - recommend ID consult at [facility].
Presenting with ersistent right hand and increased wrist pain. Unable to make a closed fist. No erythema. No fevers. WBC WNL. Elevated ESR/ CRP.  - Completed a course of abx - doxy and augmentin at home  - No signs of active infection  - pending MRI given recent procedure to r/o OM (also with elevated ESR/ CRP)  - can monitor off abx for now

## 2021-06-08 NOTE — PROGRESS NOTE ADULT - ASSESSMENT
56 year old male with DM, HTN presenting with right hand pain since prior surgery on 5/7 for Dupuytren's contracture at [facility]. He was seen after surgery to have stitches removed, and had an injection into his wrist - ?nerve block resulting in worsening pain and swelling afterwards. His course was complicated with post op cellulitis, admitted to [facility] on 5/21 and was discharged on doxy/ augmentin. He returns to the ED at [facility] with persistent right hand and increased wrist pain. Still unable to make a closed fist. No erythema. No fevers. WBC WNL. He does have an elevated ESR/ CRP. MRI with small rim-enhancing fluid collection just beyond the carpal tunnel concerning for infection.    Recommend:  #Right hand infection/ cellulitis  -Completed a course of abx - doxy and augmentin at home  -F/U plastic sx given MRI findings of fluid collection - if drained, please send for culture - bacterial, fungal and AFB.    Wicho Chacko MD  Pager [phone]  After 5pm/weekends call [phone]    Discussed plan with primary team.
56 year old male with DM, HTN presenting with right hand pain since prior surgery on 5/7 for Dupuytren's contracture at [facility]. He was seen after surgery to have stitches removed, and had an injection into his wrist - ?nerve block resulting in worsening pain and swelling afterwards. His course was complicated with post op cellulitis, admitted to [facility] on 5/21 and was discharged on doxy/ augmentin. He returns to the ED at [facility] with persistent right hand and increased wrist pain. Still unable to make a closed fist. No erythema. No fevers. WBC WNL. He does have an elevated ESR/ CRP. MRI with small rim-enhancing fluid collection just beyond the carpal tunnel concerning for infection.    Recommend:  #Right hand infection/ cellulitis  -Completed a course of abx - doxy and augmentin at home  -Patient is pending transfer to [facility] for evaluation by his hand plastic surgeon - if collection drained, please send for culture - bacterial, fungal and AFB.  -Can hold off on abx until cultures obtained.    Wicho Chacko MD  Pager [phone]  After 5pm/weekends call [phone]    Discussed plan with primary team.  Please call with questions.
56 yr old right handed man PMH DM Type 2 recently started on insulin (has not started using yet), HTN p/w right hand pain since 5/7 s/p surgery to right hand for Dupuytren's contracture at [facility]. MR hand concern for OM of hand and localized collection. 
56 yr old right handed man PMH DM Type 2 recently started on insulin (has not started using yet), HTN p/w right hand pain since 5/7 s/p surgery to right hand for Dupuytren's contracture at [facility]. Admitted for pain control and MRI to r/o OM of hand. 
56 yr old right handed man PMH DM Type 2 recently started on insulin (has not started using yet), HTN p/w right hand pain since 5/7 s/p surgery to right hand for Dupuytren's contracture at [facility]. MR hand concern for OM of hand and localized collection. evaluate by Plastic surgery who does not think surgical intervention is required. patient will be transferred to [facility] under care of Dr. Jenny Angeles (hand surgery) for further management.

## 2021-06-08 NOTE — DISCHARGE NOTE PROVIDER - NSDCCPCAREPLAN_GEN_ALL_CORE_FT
PRINCIPAL DISCHARGE DIAGNOSIS  Diagnosis: Hand pain, right  Assessment and Plan of Treatment: Acute on chronic hand pain. Your MRI showed concern for osteomyeltits (bone infection). Follow up with Dr. Anegles at [facility] for further management recommendations.  ** You were seen by pain management while at [facility]. It is recommended that you follow up with an outpatient pain speicalist as well. Also recommended pain management consult at [place].      SECONDARY DISCHARGE DIAGNOSES  Diagnosis: Uncontrolled diabetes mellitus  Assessment and Plan of Treatment: Your A1c was 11%  Continue your medication regimen and a consistent carbohydrate diet (Meaning eating the same amount of carbohydrates at the same time each day). Monitor blood glucose levels throughout the day before meals and at bedtime. Record blood sugars and bring to outpatient providers appointment in order to be reviewed by your doctor for management modifications. If your sugars are more than 400 or less than 70 you should contact your PCP immediately. Monitor for signs/symptoms of low blood glucose, such as, dizziness, altered mental status, or cool/clammy skin. In addition, monitor for signs/symptoms of high blood glucose, such as, feeling hot, dry, fatigued, or with increased thirst/urination. Make regular podiatry appointments in order to have feet checked for wounds and uncontrolled toe nail growth to prevent infections, as well as, appointments with an ophthalmologist to monitor your vision.    Diagnosis: Dupuytren's contracture of right hand  Assessment and Plan of Treatment: Follow up with Dr. Angeles at [facility] for further management recommendations.     PRINCIPAL DISCHARGE DIAGNOSIS  Diagnosis: Hand pain, right  Assessment and Plan of Treatment: Acute on chronic hand pain. Your MRI showed concern for osteomyeltits (bone infection). Follow up with Dr. Angeles at [facility] for further management recommendations.  ** You were seen by pain management while at [facility]. It is recommended that you follow up with an outpatient pain speicalist as well. Also recommended pain management consult at [place].      SECONDARY DISCHARGE DIAGNOSES  Diagnosis: Uncontrolled diabetes mellitus  Assessment and Plan of Treatment: Your A1c was 11%  Continue your medication regimen and a consistent carbohydrate diet (Meaning eating the same amount of carbohydrates at the same time each day). Monitor blood glucose levels throughout the day before meals and at bedtime. Record blood sugars and bring to outpatient providers appointment in order to be reviewed by your doctor for management modifications. If your sugars are more than 400 or less than 70 you should contact your PCP immediately. Monitor for signs/symptoms of low blood glucose, such as, dizziness, altered mental status, or cool/clammy skin. In addition, monitor for signs/symptoms of high blood glucose, such as, feeling hot, dry, fatigued, or with increased thirst/urination. Make regular podiatry appointments in order to have feet checked for wounds and uncontrolled toe nail growth to prevent infections, as well as, appointments with an ophthalmologist to monitor your vision.    Diagnosis: Dupuytren's contracture of right hand  Assessment and Plan of Treatment: Follow up with Dr. Angeles at [facility] for further management recommendations. You were seen by occupational therapy while @ [facility] and were recommended to have outpatient follow up.

## 2021-06-08 NOTE — DISCHARGE NOTE PROVIDER - NSDCMRMEDTOKEN_GEN_ALL_CORE_FT
Basaglar KwikPen 100 units/mL subcutaneous solution: 12 unit(s) subcutaneous once a day (at bedtime)  diclofenac potassium 25 mg oral capsule: 1 cap(s) orally every 8 hours, As Needed   gabapentin 300 mg oral capsule: 1 cap(s) orally 3 times a day   losartan 100 mg oral tablet: 1 tab(s) orally once a day   metFORMIN 1000 mg oral tablet: 1 tab(s) orally 2 times a day  metoprolol succinate 100 mg oral tablet, extended release: 1 tab(s) orally once a day

## 2021-06-08 NOTE — DISCHARGE NOTE PROVIDER - CARE PROVIDER_API CALL
ASIA DENTON  [id number] [place] [address]  Phone: [phone]  Fax: ()-  Established Patient  Follow Up Time: 1-3 days

## 2021-06-08 NOTE — CONSULT NOTE ADULT - ASSESSMENT
56 yr old man PMH DM Type 2 recently started on insulin (has not started using yet), HTN p/w right hand pain since 5/7 s/p surgery to right hand for Dupuytren's contracture at [facility].  Endocrine consult requested for uncontrolled Dm2. A1c 11.0%    Uncontrolled DM2  A1c 11.0%  Fs goal 100-180  Fs premeal and qhs   Recommend increase Lantus to 14units qhs   Recommend increase Admelog to 3units TIDac  Recommend low correctional scale premeal and qhs    On discharge recommend Lantus 14units qhs and Metformin 1000mg BID  Patient can follow up with endocrine (Dr Millard no longer with practice)  [facility]   [address]  [place] [address]  [phone]    HTN   goal bp <130/80  Continue metoprolol and losartan     HLD    goal <70  LDL 95  Consider statin as patient has history of DM     Discussed with Dr ramon Sanchez-Emiliana Casillas MD  Endocrine Fellow   Pager  on weekdays 9am- 5pm   Please call  after hours and on weekends

## 2021-06-09 LAB
CULTURE RESULTS: SIGNIFICANT CHANGE UP
CULTURE RESULTS: SIGNIFICANT CHANGE UP
SPECIMEN SOURCE: SIGNIFICANT CHANGE UP
SPECIMEN SOURCE: SIGNIFICANT CHANGE UP

## 2021-06-22 PROCEDURE — 80053 COMPREHEN METABOLIC PANEL: CPT

## 2021-06-22 PROCEDURE — 85730 THROMBOPLASTIN TIME PARTIAL: CPT

## 2021-06-22 PROCEDURE — 96374 THER/PROPH/DIAG INJ IV PUSH: CPT

## 2021-06-22 PROCEDURE — 85610 PROTHROMBIN TIME: CPT

## 2021-06-22 PROCEDURE — 83735 ASSAY OF MAGNESIUM: CPT

## 2021-06-22 PROCEDURE — 83605 ASSAY OF LACTIC ACID: CPT

## 2021-06-22 PROCEDURE — 87635 SARS-COV-2 COVID-19 AMP PRB: CPT

## 2021-06-22 PROCEDURE — 86140 C-REACTIVE PROTEIN: CPT

## 2021-06-22 PROCEDURE — 87086 URINE CULTURE/COLONY COUNT: CPT

## 2021-06-22 PROCEDURE — 36415 COLL VENOUS BLD VENIPUNCTURE: CPT

## 2021-06-22 PROCEDURE — 80202 ASSAY OF VANCOMYCIN: CPT

## 2021-06-22 PROCEDURE — 99285 EMERGENCY DEPT VISIT HI MDM: CPT

## 2021-06-22 PROCEDURE — 93005 ELECTROCARDIOGRAM TRACING: CPT

## 2021-06-22 PROCEDURE — 93971 EXTREMITY STUDY: CPT

## 2021-06-22 PROCEDURE — 73130 X-RAY EXAM OF HAND: CPT

## 2021-06-22 PROCEDURE — 81003 URINALYSIS AUTO W/O SCOPE: CPT

## 2021-06-22 PROCEDURE — 85027 COMPLETE CBC AUTOMATED: CPT

## 2021-06-22 PROCEDURE — 84443 ASSAY THYROID STIM HORMONE: CPT

## 2021-06-22 PROCEDURE — 96376 TX/PRO/DX INJ SAME DRUG ADON: CPT

## 2021-06-22 PROCEDURE — 85025 COMPLETE CBC W/AUTO DIFF WBC: CPT

## 2021-06-22 PROCEDURE — 82746 ASSAY OF FOLIC ACID SERUM: CPT

## 2021-06-22 PROCEDURE — 80061 LIPID PANEL: CPT

## 2021-06-22 PROCEDURE — 85652 RBC SED RATE AUTOMATED: CPT

## 2021-06-22 PROCEDURE — 96375 TX/PRO/DX INJ NEW DRUG ADDON: CPT

## 2021-06-22 PROCEDURE — 96361 HYDRATE IV INFUSION ADD-ON: CPT

## 2021-06-22 PROCEDURE — 82962 GLUCOSE BLOOD TEST: CPT

## 2021-06-22 PROCEDURE — 87040 BLOOD CULTURE FOR BACTERIA: CPT

## 2021-06-22 PROCEDURE — 84100 ASSAY OF PHOSPHORUS: CPT

## 2021-06-22 PROCEDURE — 73201 CT UPPER EXTREMITY W/DYE: CPT

## 2021-06-22 PROCEDURE — 82607 VITAMIN B-12: CPT

## 2021-06-22 PROCEDURE — 80048 BASIC METABOLIC PNL TOTAL CA: CPT

## 2021-06-22 PROCEDURE — 83036 HEMOGLOBIN GLYCOSYLATED A1C: CPT

## 2021-06-22 PROCEDURE — 86769 SARS-COV-2 COVID-19 ANTIBODY: CPT

## 2022-05-01 NOTE — OCCUPATIONAL THERAPY INITIAL EVALUATION ADULT - PERTINENT HX OF CURRENT PROBLEM, REHAB EVAL
4 Pt is a 56 yr old right handed man.PMH DM Type 2 recently started on insulin (has not started using yet), HTN p/w right hand pain since 5/7 s/p surgery to right hand for Dupuytren's contracture at [facility].  Pt states he returned back to his hand surgery on 5/19 to have sutures removed and states the surgeon struck a nerve and since then his pain has increased and reports decreased ROM and weakness in right hand. Pt is a 56 yr old right handed man with PMH DM Type 2 recently started on insulin (has not started using yet), HTN p/w right hand pain since 5/7 s/p surgery to right hand for Dupuytren's contracture at [facility].  Pt states he returned back to his hand surgery on 5/19 to have sutures removed and states the surgeon struck a nerve and since then his pain has increased and reports decreased ROM and weakness in right hand.

## 2022-05-31 NOTE — PATIENT PROFILE ADULT - NSPROGENSOURCEINFO_GEN_A_NUR
"patient

## 2022-07-28 ENCOUNTER — APPOINTMENT (OUTPATIENT)
Dept: ORTHOPEDIC SURGERY | Facility: CLINIC | Age: 57
End: 2022-07-28

## 2023-10-27 ENCOUNTER — EMERGENCY (EMERGENCY)
Facility: HOSPITAL | Age: 58
LOS: 1 days | Discharge: HOME | End: 2023-10-27
Attending: EMERGENCY MEDICINE | Admitting: EMERGENCY MEDICINE
Payer: MEDICAID

## 2023-10-27 VITALS
TEMPERATURE: 98 F | SYSTOLIC BLOOD PRESSURE: 139 MMHG | OXYGEN SATURATION: 100 % | RESPIRATION RATE: 16 BRPM | HEART RATE: 80 BPM | DIASTOLIC BLOOD PRESSURE: 85 MMHG

## 2023-10-27 VITALS
HEART RATE: 76 BPM | SYSTOLIC BLOOD PRESSURE: 130 MMHG | TEMPERATURE: 99 F | OXYGEN SATURATION: 100 % | RESPIRATION RATE: 16 BRPM | DIASTOLIC BLOOD PRESSURE: 73 MMHG

## 2023-10-27 DIAGNOSIS — K60.3 ANAL FISTULA: Chronic | ICD-10-CM

## 2023-10-27 DIAGNOSIS — S96.912S STRAIN OF UNSPECIFIED MUSCLE AND TENDON AT ANKLE AND FOOT LEVEL, LEFT FOOT, SEQUELA: Chronic | ICD-10-CM

## 2023-10-27 LAB
ALBUMIN SERPL ELPH-MCNC: 4.2 G/DL — SIGNIFICANT CHANGE UP (ref 3.3–5)
ALBUMIN SERPL ELPH-MCNC: 4.2 G/DL — SIGNIFICANT CHANGE UP (ref 3.3–5)
ALP SERPL-CCNC: 50 U/L — SIGNIFICANT CHANGE UP (ref 40–120)
ALP SERPL-CCNC: 50 U/L — SIGNIFICANT CHANGE UP (ref 40–120)
ALT FLD-CCNC: 17 U/L — SIGNIFICANT CHANGE UP (ref 4–41)
ALT FLD-CCNC: 17 U/L — SIGNIFICANT CHANGE UP (ref 4–41)
ANION GAP SERPL CALC-SCNC: 13 MMOL/L — SIGNIFICANT CHANGE UP (ref 7–14)
ANION GAP SERPL CALC-SCNC: 13 MMOL/L — SIGNIFICANT CHANGE UP (ref 7–14)
APTT BLD: 27.5 SEC — SIGNIFICANT CHANGE UP (ref 24.5–35.6)
APTT BLD: 27.5 SEC — SIGNIFICANT CHANGE UP (ref 24.5–35.6)
AST SERPL-CCNC: 11 U/L — SIGNIFICANT CHANGE UP (ref 4–40)
AST SERPL-CCNC: 11 U/L — SIGNIFICANT CHANGE UP (ref 4–40)
BASE EXCESS BLDV CALC-SCNC: -0.8 MMOL/L — SIGNIFICANT CHANGE UP (ref -2–3)
BASE EXCESS BLDV CALC-SCNC: -0.8 MMOL/L — SIGNIFICANT CHANGE UP (ref -2–3)
BASOPHILS # BLD AUTO: 0.04 K/UL — SIGNIFICANT CHANGE UP (ref 0–0.2)
BASOPHILS # BLD AUTO: 0.04 K/UL — SIGNIFICANT CHANGE UP (ref 0–0.2)
BASOPHILS NFR BLD AUTO: 0.7 % — SIGNIFICANT CHANGE UP (ref 0–2)
BASOPHILS NFR BLD AUTO: 0.7 % — SIGNIFICANT CHANGE UP (ref 0–2)
BILIRUB SERPL-MCNC: 0.6 MG/DL — SIGNIFICANT CHANGE UP (ref 0.2–1.2)
BILIRUB SERPL-MCNC: 0.6 MG/DL — SIGNIFICANT CHANGE UP (ref 0.2–1.2)
BLOOD GAS VENOUS COMPREHENSIVE RESULT: SIGNIFICANT CHANGE UP
BLOOD GAS VENOUS COMPREHENSIVE RESULT: SIGNIFICANT CHANGE UP
BUN SERPL-MCNC: 9 MG/DL — SIGNIFICANT CHANGE UP (ref 7–23)
BUN SERPL-MCNC: 9 MG/DL — SIGNIFICANT CHANGE UP (ref 7–23)
CALCIUM SERPL-MCNC: 9 MG/DL — SIGNIFICANT CHANGE UP (ref 8.4–10.5)
CALCIUM SERPL-MCNC: 9 MG/DL — SIGNIFICANT CHANGE UP (ref 8.4–10.5)
CHLORIDE BLDV-SCNC: 103 MMOL/L — SIGNIFICANT CHANGE UP (ref 96–108)
CHLORIDE BLDV-SCNC: 103 MMOL/L — SIGNIFICANT CHANGE UP (ref 96–108)
CHLORIDE SERPL-SCNC: 103 MMOL/L — SIGNIFICANT CHANGE UP (ref 98–107)
CHLORIDE SERPL-SCNC: 103 MMOL/L — SIGNIFICANT CHANGE UP (ref 98–107)
CO2 BLDV-SCNC: 26.8 MMOL/L — HIGH (ref 22–26)
CO2 BLDV-SCNC: 26.8 MMOL/L — HIGH (ref 22–26)
CO2 SERPL-SCNC: 21 MMOL/L — LOW (ref 22–31)
CO2 SERPL-SCNC: 21 MMOL/L — LOW (ref 22–31)
CREAT SERPL-MCNC: 0.82 MG/DL — SIGNIFICANT CHANGE UP (ref 0.5–1.3)
CREAT SERPL-MCNC: 0.82 MG/DL — SIGNIFICANT CHANGE UP (ref 0.5–1.3)
EGFR: 102 ML/MIN/1.73M2 — SIGNIFICANT CHANGE UP
EGFR: 102 ML/MIN/1.73M2 — SIGNIFICANT CHANGE UP
EOSINOPHIL # BLD AUTO: 0.11 K/UL — SIGNIFICANT CHANGE UP (ref 0–0.5)
EOSINOPHIL # BLD AUTO: 0.11 K/UL — SIGNIFICANT CHANGE UP (ref 0–0.5)
EOSINOPHIL NFR BLD AUTO: 2.1 % — SIGNIFICANT CHANGE UP (ref 0–6)
EOSINOPHIL NFR BLD AUTO: 2.1 % — SIGNIFICANT CHANGE UP (ref 0–6)
GAS PNL BLDV: 135 MMOL/L — LOW (ref 136–145)
GAS PNL BLDV: 135 MMOL/L — LOW (ref 136–145)
GLUCOSE BLDV-MCNC: 146 MG/DL — HIGH (ref 70–99)
GLUCOSE BLDV-MCNC: 146 MG/DL — HIGH (ref 70–99)
GLUCOSE SERPL-MCNC: 142 MG/DL — HIGH (ref 70–99)
GLUCOSE SERPL-MCNC: 142 MG/DL — HIGH (ref 70–99)
HCO3 BLDV-SCNC: 25 MMOL/L — SIGNIFICANT CHANGE UP (ref 22–29)
HCO3 BLDV-SCNC: 25 MMOL/L — SIGNIFICANT CHANGE UP (ref 22–29)
HCT VFR BLD CALC: 40.8 % — SIGNIFICANT CHANGE UP (ref 39–50)
HCT VFR BLD CALC: 40.8 % — SIGNIFICANT CHANGE UP (ref 39–50)
HCT VFR BLDA CALC: 41 % — SIGNIFICANT CHANGE UP (ref 39–51)
HCT VFR BLDA CALC: 41 % — SIGNIFICANT CHANGE UP (ref 39–51)
HGB BLD CALC-MCNC: 13.8 G/DL — SIGNIFICANT CHANGE UP (ref 12.6–17.4)
HGB BLD CALC-MCNC: 13.8 G/DL — SIGNIFICANT CHANGE UP (ref 12.6–17.4)
HGB BLD-MCNC: 13.3 G/DL — SIGNIFICANT CHANGE UP (ref 13–17)
HGB BLD-MCNC: 13.3 G/DL — SIGNIFICANT CHANGE UP (ref 13–17)
IANC: 2.75 K/UL — SIGNIFICANT CHANGE UP (ref 1.8–7.4)
IANC: 2.75 K/UL — SIGNIFICANT CHANGE UP (ref 1.8–7.4)
IMM GRANULOCYTES NFR BLD AUTO: 0.6 % — SIGNIFICANT CHANGE UP (ref 0–0.9)
IMM GRANULOCYTES NFR BLD AUTO: 0.6 % — SIGNIFICANT CHANGE UP (ref 0–0.9)
INR BLD: 1.04 RATIO — SIGNIFICANT CHANGE UP (ref 0.85–1.18)
INR BLD: 1.04 RATIO — SIGNIFICANT CHANGE UP (ref 0.85–1.18)
LACTATE BLDV-MCNC: 3.4 MMOL/L — HIGH (ref 0.5–2)
LACTATE BLDV-MCNC: 3.4 MMOL/L — HIGH (ref 0.5–2)
LYMPHOCYTES # BLD AUTO: 1.99 K/UL — SIGNIFICANT CHANGE UP (ref 1–3.3)
LYMPHOCYTES # BLD AUTO: 1.99 K/UL — SIGNIFICANT CHANGE UP (ref 1–3.3)
LYMPHOCYTES # BLD AUTO: 37.2 % — SIGNIFICANT CHANGE UP (ref 13–44)
LYMPHOCYTES # BLD AUTO: 37.2 % — SIGNIFICANT CHANGE UP (ref 13–44)
MCHC RBC-ENTMCNC: 27 PG — SIGNIFICANT CHANGE UP (ref 27–34)
MCHC RBC-ENTMCNC: 27 PG — SIGNIFICANT CHANGE UP (ref 27–34)
MCHC RBC-ENTMCNC: 32.6 GM/DL — SIGNIFICANT CHANGE UP (ref 32–36)
MCHC RBC-ENTMCNC: 32.6 GM/DL — SIGNIFICANT CHANGE UP (ref 32–36)
MCV RBC AUTO: 82.8 FL — SIGNIFICANT CHANGE UP (ref 80–100)
MCV RBC AUTO: 82.8 FL — SIGNIFICANT CHANGE UP (ref 80–100)
MONOCYTES # BLD AUTO: 0.43 K/UL — SIGNIFICANT CHANGE UP (ref 0–0.9)
MONOCYTES # BLD AUTO: 0.43 K/UL — SIGNIFICANT CHANGE UP (ref 0–0.9)
MONOCYTES NFR BLD AUTO: 8 % — SIGNIFICANT CHANGE UP (ref 2–14)
MONOCYTES NFR BLD AUTO: 8 % — SIGNIFICANT CHANGE UP (ref 2–14)
NEUTROPHILS # BLD AUTO: 2.75 K/UL — SIGNIFICANT CHANGE UP (ref 1.8–7.4)
NEUTROPHILS # BLD AUTO: 2.75 K/UL — SIGNIFICANT CHANGE UP (ref 1.8–7.4)
NEUTROPHILS NFR BLD AUTO: 51.4 % — SIGNIFICANT CHANGE UP (ref 43–77)
NEUTROPHILS NFR BLD AUTO: 51.4 % — SIGNIFICANT CHANGE UP (ref 43–77)
NRBC # BLD: 0 /100 WBCS — SIGNIFICANT CHANGE UP (ref 0–0)
NRBC # BLD: 0 /100 WBCS — SIGNIFICANT CHANGE UP (ref 0–0)
NRBC # FLD: 0 K/UL — SIGNIFICANT CHANGE UP (ref 0–0)
NRBC # FLD: 0 K/UL — SIGNIFICANT CHANGE UP (ref 0–0)
PCO2 BLDV: 47 MMHG — SIGNIFICANT CHANGE UP (ref 42–55)
PCO2 BLDV: 47 MMHG — SIGNIFICANT CHANGE UP (ref 42–55)
PH BLDV: 7.34 — SIGNIFICANT CHANGE UP (ref 7.32–7.43)
PH BLDV: 7.34 — SIGNIFICANT CHANGE UP (ref 7.32–7.43)
PLATELET # BLD AUTO: 287 K/UL — SIGNIFICANT CHANGE UP (ref 150–400)
PLATELET # BLD AUTO: 287 K/UL — SIGNIFICANT CHANGE UP (ref 150–400)
PO2 BLDV: 42 MMHG — SIGNIFICANT CHANGE UP (ref 25–45)
PO2 BLDV: 42 MMHG — SIGNIFICANT CHANGE UP (ref 25–45)
POTASSIUM BLDV-SCNC: 4.6 MMOL/L — SIGNIFICANT CHANGE UP (ref 3.5–5.1)
POTASSIUM BLDV-SCNC: 4.6 MMOL/L — SIGNIFICANT CHANGE UP (ref 3.5–5.1)
POTASSIUM SERPL-MCNC: 4.2 MMOL/L — SIGNIFICANT CHANGE UP (ref 3.5–5.3)
POTASSIUM SERPL-MCNC: 4.2 MMOL/L — SIGNIFICANT CHANGE UP (ref 3.5–5.3)
POTASSIUM SERPL-SCNC: 4.2 MMOL/L — SIGNIFICANT CHANGE UP (ref 3.5–5.3)
POTASSIUM SERPL-SCNC: 4.2 MMOL/L — SIGNIFICANT CHANGE UP (ref 3.5–5.3)
PROT SERPL-MCNC: 6.5 G/DL — SIGNIFICANT CHANGE UP (ref 6–8.3)
PROT SERPL-MCNC: 6.5 G/DL — SIGNIFICANT CHANGE UP (ref 6–8.3)
PROTHROM AB SERPL-ACNC: 11.7 SEC — SIGNIFICANT CHANGE UP (ref 9.5–13)
PROTHROM AB SERPL-ACNC: 11.7 SEC — SIGNIFICANT CHANGE UP (ref 9.5–13)
RBC # BLD: 4.93 M/UL — SIGNIFICANT CHANGE UP (ref 4.2–5.8)
RBC # BLD: 4.93 M/UL — SIGNIFICANT CHANGE UP (ref 4.2–5.8)
RBC # FLD: 12.4 % — SIGNIFICANT CHANGE UP (ref 10.3–14.5)
RBC # FLD: 12.4 % — SIGNIFICANT CHANGE UP (ref 10.3–14.5)
SAO2 % BLDV: 67.3 % — SIGNIFICANT CHANGE UP (ref 67–88)
SAO2 % BLDV: 67.3 % — SIGNIFICANT CHANGE UP (ref 67–88)
SODIUM SERPL-SCNC: 137 MMOL/L — SIGNIFICANT CHANGE UP (ref 135–145)
SODIUM SERPL-SCNC: 137 MMOL/L — SIGNIFICANT CHANGE UP (ref 135–145)
WBC # BLD: 5.35 K/UL — SIGNIFICANT CHANGE UP (ref 3.8–10.5)
WBC # BLD: 5.35 K/UL — SIGNIFICANT CHANGE UP (ref 3.8–10.5)
WBC # FLD AUTO: 5.35 K/UL — SIGNIFICANT CHANGE UP (ref 3.8–10.5)
WBC # FLD AUTO: 5.35 K/UL — SIGNIFICANT CHANGE UP (ref 3.8–10.5)

## 2023-10-27 PROCEDURE — 70498 CT ANGIOGRAPHY NECK: CPT | Mod: 26,MG

## 2023-10-27 PROCEDURE — G1004: CPT

## 2023-10-27 PROCEDURE — 93010 ELECTROCARDIOGRAM REPORT: CPT

## 2023-10-27 PROCEDURE — 99285 EMERGENCY DEPT VISIT HI MDM: CPT

## 2023-10-27 PROCEDURE — 70450 CT HEAD/BRAIN W/O DYE: CPT | Mod: 26,MG,59

## 2023-10-27 PROCEDURE — 70496 CT ANGIOGRAPHY HEAD: CPT | Mod: 26,MG

## 2023-10-27 NOTE — ED ADULT NURSE NOTE - OBJECTIVE STATEMENT
A&Ox4. ambulatory. c/o intermittent R side weakness, headache , lethargy, dizziness, numbness/tingling to facial area. NAD. pt denies SOB, chest pain,  weakness, urinary symptoms, n/v/d, fevers, chills. respirations are even and un labored. ABD is soft. skin intact. safety precautions maintained. call bell at bedside.

## 2023-10-27 NOTE — ED PROVIDER NOTE - PHYSICAL EXAMINATION
GEN: resting in bed comfortably. NAD  Neuro: A&Ox3, negative drop arm test, negative finger-to-nose, negative nystagmus, CNII-XII grossly intact, no unilateral motor deficit, + decreased sensation to light touch on the right pre-auricular region  Head: Atraumatic, normocephalic  Eye: EOMI, PERRLA, conjunctiva and sclera clear, no discharge  Oral:  MMM  Neck: supple, no lymphadenopathy, uvela midline  CV: RRR, no murmurs/rubs/gallops appreciated  Pulm: Chest wall without deformity, breath sound symmetric, CTA bilaterally, no wheeze/rales/rhonchi.  Abdomen: NABS x 4, soft, NT, ND.    Extremities: Skin intact, compartment soft.  Strength 5/5 to all extremities, DF/PF/EHL/FHL with strength 5/5 bilaterally.  2+DP pulses, warm and well perfused to all digits. Capillary refills < 2 seconds

## 2023-10-27 NOTE — ED PROVIDER NOTE - ATTENDING APP SHARED VISIT CONTRIBUTION OF CARE
Agree with PA note  58-year-old male with past medical history of diabetes, hypertension, TIA (was remotely on Plavix now off) presents with 4 days of right-sided paresthesias.  States first on face then and arms then and legs.  Denies any motor weakness.  Went to see PCP and was started on Plavix again which she is taking.  Presents emergency room for evaluation.  States all the symptoms have resolved.  Denies any speech impediment, gait instability, motor weakness.  Physical exam  Gen: pt well appearing in no respiratory distress  vital signs stable  NCAT  Lungs: CTAB/L  Cardiac: s1 s2 no m/r/g  abdomen: soft/NT/ND  ext: no edema  Neuro: CNs intact 5/5 motor UE and LE; sensation intact; gait stable  skin: no rash  Impression  Given prior history of TIA we will get CTA head and neck, consult neurology, no acute findings on exam, patient is not a tPA candidate  We will sign out to incoming attending

## 2023-10-27 NOTE — CONSULT NOTE ADULT - ASSESSMENT
YANNA 9 AM 10/23/2023  NIHSS 1  preMRS 0  Pt not IV tenecteplase candidate because outside of time window  Pt not thrombectomy candidate because outside of time window    CTH: Pending  CTA: Pending    Impression: Fluctuating R sensorimotor syndrome with accompanying R-sided headache. DDx includes ICH, migraine with neurologic features, and TIA/Acute ischemic stroke. If the latter, mechanism unknown at the time, pending further workup.    Recommendations:  To be discussed with stroke fellow TESSW 9 AM 10/23/2023  NIHSS 1  preMRS 0  Pt not IV tenecteplase candidate because outside of time window  Pt not thrombectomy candidate because outside of time window    CTH: Pending  CTA: Pending    Impression: Fluctuating R sensorimotor syndrome with accompanying R-sided headache. Localizes to L Brain dysfunctcion. DDx includes ICH, migraine with neurologic features, and TIA/Acute ischemic stroke. If the latter, mechanism unknown at the time, pending further workup.    Recommendations:  [ ] F/u CTH noncon and CTA Head/Neck.       --> If evidence of intracranial hemorrhage, please recontact neurology, as well as consult neurosurgery       --> If no intracranial hemorrhage, please follow plan as below:    Recommendations if no intracranial hemorrhage:   [ ] DAPT (aspirin and plavix) for 3 weeks, followed by aspirin monotherapy thereafter (per [facility])  [ ] Atorvastatin 40-80 mg daily titrated per LDL < 70  [ ] HgbA1C, fasting lipid panel, CBC, CMP, coag panel, troponin  [ ] MRI brain w/o con  [ ] TTE w/ bubble study  [ ] telemetry to check for arrhythmia, EKG, will discuss loop recorder    - Glucose control   - Stroke education and counseling  - Neuro-checks and VS q4h  - Permissive HTN up to 220/120 for 24-48h from symptom onset  - Dysphagia screen. If fails, speech/swallow eval  - aspiration, fall precautions  - STAT CT head non-contrast for change in neuro exam.   - PT/ OT / DVT ppx per primary team     Case discussed with stroke fellow Dr. Usman Biswas     YANNA 9 AM 10/23/2023  NIHSS 1  preMRS 0  Pt not IV tenecteplase candidate because outside of time window  Pt not thrombectomy candidate because outside of time window    CTH: No hemorrhage  CTA: moderate narrowing of the proximal right posterior cerebral artery in the perimesencephalic region    Impression: Fluctuating R sensorimotor syndrome with accompanying R-sided headache. Localizes to L Brain dysfunctcion. DDx includes migraine with neurologic features and TIA/Acute ischemic stroke. If the latter, mechanism unknown at the time, pending further workup.    Recommendations   [ ] DAPT (aspirin and plavix) for 3 weeks, followed by aspirin monotherapy thereafter (per [facility])  [ ] Atorvastatin 40-80 mg daily titrated per LDL < 70  [ ] HgbA1C, fasting lipid panel, CBC, CMP, coag panel, troponin  [ ] MRI brain w/o con  [ ] TTE w/ bubble study  [ ] telemetry to check for arrhythmia, EKG, will discuss loop recorder    - Glucose control   - Stroke education and counseling  - Neuro-checks and VS q4h  - Permissive HTN up to 220/120 for 24-48h from symptom onset  - Dysphagia screen. If fails, speech/swallow eval  - aspiration, fall precautions  - STAT CT head non-contrast for change in neuro exam.   - PT/ OT / DVT ppx per primary team     Case discussed with stroke fellow Dr. Usman Biswas

## 2023-10-27 NOTE — ED ADULT TRIAGE NOTE - CHIEF COMPLAINT QUOTE
Pt c/o  intermittent R side weakness, headache , lethargy, dizziness, numbness/tingling to facial area  since Monday.  Denies  blurry vision, slurred speech, chest pain

## 2023-10-27 NOTE — ED PROVIDER NOTE - PATIENT PORTAL LINK FT
Anesthesia Post-op Note    Patient: Damon Rod  Procedure(s) Performed: REVISION OF COLOSTOMY,  INTRAOPERATIVE SIGMOIDOSCOPY  Anesthesia type: General    Vitals Value Taken Time   Temp 36.3 10/09/23 1751   Pulse 82 10/09/23 1750   Resp 9 10/09/23 1750   SpO2 99 % 10/09/23 1750   /85 10/09/23 1751   Vitals shown include unvalidated device data.      Patient Location: PACU Phase 1  Post-op Vital Signs:stable  Level of Consciousness: awake and alert  Respiratory Status: spontaneous ventilation  Cardiovascular stable  Hydration: euvolemic  Pain Management: adequately controlled  Handoff: Handoff to receiving clinician was performed and questions were answered  Vomiting: none  Nausea: None  Airway Patency:patent  Post-op Assessment: no complications, patient tolerated procedure well, no evidence of recall, dentition within defined limits, moving all extremities and No Corneal Abrasion      No notable events documented.  
You can access the FollowMyHealth Patient Portal offered by [facility] by registering at the following website: [email or web address]. By joining [facility]’s FollowMyHealth portal, you will also be able to view your health information using other applications (apps) compatible with our system.

## 2023-10-27 NOTE — CONSULT NOTE ADULT - SUBJECTIVE AND OBJECTIVE BOX
Neurology - Consult Note    -  Spectra: [id number] ([facility]), [phone] ([facility])  -    HPI: 58M pmhx TIA (2021, slurred speech at the time, currently on Plavix), HTN, DM, HLD, Anxiety, Dupuytren's Contracture s/p surgical repair, who presents with 4 days of R-sided weakness and numbness. Symptoms began at 9 AM on Monday 10/23, they have been coming and going since then. Weakness and numbness affect L face, arm, and leg. Also accompanied by R-sided headache (maximally 5/10, pulsating, no n/v, +photophobia, nonpositional), whose severity fluctuates with the weakness/numbness. No hx of migraines. Of note, after pt's 2021 TIA he took plavix for 1-2 months, after which he switched to aspirin, but within the past week he actually switched back to plavix (reported stomach cramps with aspirin, no blood in stool). No blurry vision, double vision, slurred speech, speech disturbances, or trouble swallowing. Pt is independent in ambulation and ADLs at baseline.         Allergies:  Vasotec (Hives)      PMHx/PSHx/Family Hx: As above, otherwise see below   Diabetes mellitus    Hyperuricemia    Dyslipidemia    Anal fistula    Sprain, bicep        Social Hx:  No current use of tobacco, alcohol, or illicit drugs  Lives with ***    Medications:  MEDICATIONS  (STANDING):    MEDICATIONS  (PRN):      Vitals:  T(C): 37 (10-27-23 @ 10:47), Max: 37 (10-27-23 @ 10:47)  HR: 76 (10-27-23 @ 10:47) (76 - 76)  BP: 130/73 (10-27-23 @ 10:47) (130/73 - 130/73)  RR: 16 (10-27-23 @ 10:47) (16 - 16)  SpO2: 100% (10-27-23 @ 10:47) (100% - 100%)    Physical Examination:   General - NAD  Cardiovascular - Peripheral pulses palpable, no edema  Eyes - Fundoscopy not well visualized    Neurologic Exam:  Mental status - Awake, Alert, Oriented to person, place, and time. Speech fluent, repetition and naming intact. Follows simple and complex commands. Attention/concentration, recent and remote memory (including registration and recall), and fund of knowledge intact    Cranial nerves - PERRLA, VFF, EOMI, face sensation (V1-V3) intact b/l, facial strength intact without asymmetry b/l, hearing intact b/l, palate with symmetric elevation, trapezius OR sternocleidomastoid 5/5 strength b/l, tongue midline on protrusion with full lateral movement    Motor - Normal bulk and tone throughout. No pronator drift.  Strength testing            Deltoid      Biceps      Triceps     Wrist Extension    Wrist Flexion     Interossei         R            5              4+              4+                     5                              5                        5                 5  L             5               5               5                     5                              5                        5                 5              Hip Flexion    Hip Extension    Knee Flexion    Knee Extension    Dorsiflexion    Plantar Flexion  R              5                           5                       5                           5                            5                          5  L              5                           5                        5                           5                            5                          5    Sensation - Decreased sensation to LT on R face, arm, and leg (feels 75% compared to 100% on his L side)    DTR's -             Biceps      Triceps     Brachioradialis      Patellar    Ankle    Toes/plantar response  R             2+             2+                  2+                 0+          1+                 Mute  L              2+             2+                 2+                  2+          1+                 Mute    Coordination - No dysmetria on Finger to Nose intact b/l.     Gait and station - Normal casual gait. Romberg (-)    Labs:                        13.3   5.35  )-----------( 287      ( 27 Oct 2023 12:10 )             40.8     10-27    137  |  103  |  9   ----------------------------<  142<H>  4.2   |  21<L>  |  0.82    Ca    9.0      27 Oct 2023 12:10    TPro  6.5  /  Alb  4.2  /  TBili  0.6  /  DBili  x   /  AST  11  /  ALT  17  /  AlkPhos  50  10-27    CAPILLARY BLOOD GLUCOSE      POCT Blood Glucose.: 118 mg/dL (27 Oct 2023 13:40)    LIVER FUNCTIONS - ( 27 Oct 2023 12:10 )  Alb: 4.2 g/dL / Pro: 6.5 g/dL / ALK PHOS: 50 U/L / ALT: 17 U/L / AST: 11 U/L / GGT: x             PT/INR - ( 27 Oct 2023 12:10 )   PT: 11.7 sec;   INR: 1.04 ratio         PTT - ( 27 Oct 2023 12:10 )  PTT:27.5 sec  CSF:                  Radiology:

## 2023-10-27 NOTE — ED PROVIDER NOTE - CLINICAL SUMMARY MEDICAL DECISION MAKING FREE TEXT BOX
58-year-old male with PMH significant but not limited to DMII, HTN, Anxiety, TIA (1/2022, on plavix, but non-compliant, only took it for a month) presents with 4 days of right side facial paresthesia.  On exam, no right sided weakness, + decreased sensation to light touch on right side of face along the trigeminal distribution.  Otherwise, neurovascular intact. 58-year-old male with PMH significant but not limited to DMII, HTN, Anxiety, TIA (1/2022, on plavix, but non-compliant, only took it for a month) presents with 4 days of right side facial paresthesia.  On exam, no right sided weakness, + decreased sensation to light touch on right side of face along the trigeminal distribution.  Otherwise, neurovascular intact.  Differential diagnoses include trigeminal neuropathy vs neuropathy from prolonged uncontrolled DM (15 years, poor compliance on medication).  Given the symptoms of unilateral paresthesia and unilateral headache and subjective unilateral weakness, will consider CTA of head and neck with contrast and CT of head without contrast.  No stroke code because the onset of symptoms were 4 days ago.  Will consult neurology team.  Plan:  -Labs: CBC, CMP, Coags  -Imaging: CTA of head and neck with contrast, CT of head without contrast  - Supportive care: IVF for hydration and pain control.

## 2023-10-27 NOTE — ED ADULT NURSE NOTE - NSFALLUNIVINTERV_ED_ALL_ED
Bed/Stretcher in lowest position, wheels locked, appropriate side rails in place/Call bell, personal items and telephone in reach/Instruct patient to call for assistance before getting out of bed/chair/stretcher/Non-slip footwear applied when patient is off stretcher/[place] to call system/Physically safe environment - no spills, clutter or unnecessary equipment/Purposeful proactive rounding/Room/bathroom lighting operational, light cord in reach

## 2023-10-27 NOTE — ED PROVIDER NOTE - NSFOLLOWUPINSTRUCTIONS_ED_ALL_ED_FT
Paresthesia    WHAT YOU NEED TO KNOW:    Paresthesia is numbness, tingling, or burning. It can happen in any part of your body, but usually occurs in your legs, feet, arms, or hands.    DISCHARGE INSTRUCTIONS:    Return to the emergency department if:    You have severe pain along with numbness and tingling.    Your legs suddenly become cold. You have trouble moving your legs, and they ache.    You have increased weakness in a part of your body.    You have uncontrolled movements.  Contact your healthcare provider or neurologist if:    Your symptoms do not improve.    You have symptoms in more than one part of your body.    You have questions or concerns about your condition or care.  Manage paresthesia:    Protect the area from injury. You may injure or burn yourself if you lose feeling in the area. Be careful when you touch anything that could be hot. Wear sturdy shoes to protect your feet. Ask about other ways to protect yourself.    Go to physical or occupational therapy if directed. Your provider may recommend therapy if you have a condition such as carpal tunnel syndrome. A physical therapist can teach you exercises to help strengthen the area or increase your ability to move it. An occupational therapist can help you find new ways to do your daily activities.    Manage health conditions that can cause paresthesia. Work with your diabetes specialist if you have uncontrolled diabetes. A dietitian or your healthcare provider can help you create a meal plan if you have low vitamin B levels. Your provider can help you manage your health if you have multiple sclerosis or you had a stroke. It is important to manage health conditions to stop paresthesia or prevent it from getting worse.  Follow up with your healthcare provider or neurologist as directed: Your healthcare provider may refer you to a specialist. Write down your questions so you remember to ask them during your visits.    Parestesia    LO QUE NECESITA SABER:    La parestesia es bora sensación de adormecimiento, hormigueo o ardor. Puede ocurrir en cualquier parte de huff cuerpo, edgar generalmente se produce en las piernas, pies, brazos o stephanie.    INSTRUCCIONES SOBRE EL REMY HOSPITALARIA:    Regrese a la genesis de emergencias si:    Tiene dolor grave junto con entumecimiento y hormigueo.    Kelly piernas se ponen frías súbitamente. Tiene dificultad para  las piernas y éstas le duelen.    Tiene mayor debilidad en bora parte de huff cuerpo.    Tiene movimientos descontrolados.  Comuníquese con huff médico o neurólogo si:    Kelly síntomas no mejoran.    Tiene síntomas en más de bora parte del cuerpo.    Usted tiene preguntas o inquietudes acerca de huff condición o cuidado.  Manejo de la parestesia:    Proteja el área de lesiones.Puede lesionarse o quemarse si pierde la sensibilidad en la chandni. Tenga cuidado al tocar cualquier cosa que podría estar caliente. Use zapatos resistentes para protegerse los pies. Consulte sobre otras formas de protegerse.    Acuda a fisioterapia o terapia ocupacional si se lo indican.Huff médico puede recomendar tratamiento si usted tiene bora afección, cj el síndrome del túnel carpiano. Un fisioterapeuta puede enseñarle ejercicios para ayudar a fortalecer la chandni o aumentar huff capacidad para moverla. Un terapeuta ocupacional puede ayudarlo a encontrar nuevas formas de hacer kelly actividades diarias.    Mantenga bajo control las afecciones que pueden causar la parestesia.Trabaje con huff especialista en diabetes si tiene diabetes no controlada. Un dietista o huff médico puede ayudarlo a crear un plan de alimentación si kelly niveles de vitamina B son bajos. Huff médico puede ayudarlo a mantener huff fabrice bajo control si tiene esclerosis múltiple o tuvo un accidente cerebrovascular. Es importante mantener las afecciones bajo control para detener la parestesia o evitar que empeore.  Programe bora joselyn con huff médico o huff neurólogo cj se le indique:Huff médico también podría derivarlo a un especialista. Anote kelly preguntas para que se acuerde de hacerlas price kelly visitas.

## 2023-10-27 NOTE — ED PROVIDER NOTE - PROGRESS NOTE DETAILS
ARLEEN Jones - discussed ct results with patient. recommending staying in CDU for further work up including MRI and ECHO - states does not want to stay and would rather follow up outpt. Pt. understands riscks involved - shared decision making engaged and patient would rather have rest of neurovascular work up outpt. Will attempt to facilitae via dc loung. Pt. already on plavix from PCP at this time.   Risks, benefits explained in full.  All questions answered.  Patient is alert oriented to person, place and time and understands completely their decision.

## 2023-10-27 NOTE — ED PROVIDER NOTE - OBJECTIVE STATEMENT
58-year-old male with PMH significant but not limited to DMII, HTN, Anxiety, TIA (1/2022, on plavix, but non-compliant, only took it for a month) presents with 4 days of right side facial paresthesia(started on Monday).  Patient saw his primary care physician the day after the onset (on Tuesday), was given Plavix, and told to come to ED.  Instead, patient decided to wait and see if symptoms may go away by removing some of his current medications (Amlodipine, ozempic, Lopressor, metformin, Flomax).  Patient also reports right sided weakness from head to toe, associated right frontotemporal headache and loss of balance.  Patient denies syncope, LOC, blurry vision, hearing loss, dysphagia, or fall.  Patient denies fever, chills, CP, SOB, abdominal pain, urinary symptoms.

## 2024-02-19 NOTE — ED PROVIDER NOTE - CHPI ED SYMPTOMS POS
Acute Respiratory Infection     Increase fluid intake, and rest over the next 2-3 days.   Disinfect all common surface areas.   Good handwashing is encouraged.    Take the entire course of antibiotics even if symptoms resolve sooner.   Over-the-counter expectorants, such as Mucinex, are recommended to help relieve symptoms.   Warm salt water gargles will help with sore throat (1 tsp of salt dissolved in 8 oz of water).   Saline nasal spray and Sinus Rinses are encouraged.   May use Motrin (Ibuprofen) or Tylenol (Acetaminophen) for pain relief.   If symptoms worsen or do not improve, follow-up with your primary care provider.     
DIFFICULTY BEARING WEIGHT/TENDERNESS/subjective fever, chills/JOINT SWELLING

## 2024-05-21 NOTE — H&P PST ADULT - TEACHING/LEARNING RELIGIOUS CONSIDERATIONS
<p>Prior to commencing surgery patient identification, surgical procedure, site, and side were confirmed by Dr. Carmichael.&nbsp; Following topical proparacaine anesthesia, the patient was positioned at the YAG laser, a contact lens coupled to the cornea of the right eye with methylcellulose and an axial posterior capsulotomy performed without complication using 3.3 Mj x 16. Attention was then turned to the left eye and a contact lens coupled to the cornea of the left eye with methylcellulose and an axial posterior capsulotomy performed without complication using 3.5 Mj x 26. One drop of Alphagan was instilled in both eyes and the patient returned to the holding area having tolerated the procedure well and without complication. </p><p>MRN:[id number]</p>
18
none

## 2024-09-03 NOTE — H&P PST ADULT - FUNCTIONAL LEVEL PRIOR: DRESSING
----- Message from Jose Manuel Marcelo sent at 9/3/2024  9:00 AM CDT -----  Regarding: appointment access  Contact: [phone]  Pt is calling because her blood pressure is very low and pt pos is to high. Pt will like to sp[eak with someone in your office . Please contact pt .     Shirley Bergeroneugene   [phone]    Please contact pt because pt blood pressure  is low  and pos is running high .  
0 = independent